# Patient Record
Sex: FEMALE | NOT HISPANIC OR LATINO | Employment: FULL TIME | URBAN - METROPOLITAN AREA
[De-identification: names, ages, dates, MRNs, and addresses within clinical notes are randomized per-mention and may not be internally consistent; named-entity substitution may affect disease eponyms.]

---

## 2017-06-28 ENCOUNTER — PRE VISIT (OUTPATIENT)
Dept: NEUROLOGY | Facility: CLINIC | Age: 57
End: 2017-06-28

## 2017-06-29 NOTE — TELEPHONE ENCOUNTER
Records received from [place]. Notified the film room to pull images.   Included  Office notes: 7/3/14, 7/2/14, 7/1/14   Phone calls/notes: 4/5/17, 7/23/14   Radiology reports: MRI head on 7/2/14   MRI cervical and thoracic on 7/2/14

## 2017-06-30 NOTE — TELEPHONE ENCOUNTER
Records received from [facility].  Included  ED notes: 10/13/13, 10/16/13  Radiology reports: xray left shoulder on 10/13/13

## 2017-07-11 ENCOUNTER — TELEPHONE (OUTPATIENT)
Dept: NEUROLOGY | Facility: CLINIC | Age: 57
End: 2017-07-11

## 2017-07-11 ENCOUNTER — OFFICE VISIT (OUTPATIENT)
Dept: NEUROLOGY | Facility: CLINIC | Age: 57
End: 2017-07-11
Attending: PSYCHIATRY & NEUROLOGY
Payer: COMMERCIAL

## 2017-07-11 ENCOUNTER — MEDICAL CORRESPONDENCE (OUTPATIENT)
Dept: HEALTH INFORMATION MANAGEMENT | Facility: CLINIC | Age: 57
End: 2017-07-11

## 2017-07-11 VITALS
HEIGHT: 65 IN | WEIGHT: 140 LBS | HEART RATE: 74 BPM | BODY MASS INDEX: 23.32 KG/M2 | DIASTOLIC BLOOD PRESSURE: 69 MMHG | SYSTOLIC BLOOD PRESSURE: 102 MMHG

## 2017-07-11 DIAGNOSIS — G35 MULTIPLE SCLEROSIS (H): ICD-10-CM

## 2017-07-11 DIAGNOSIS — G35 MULTIPLE SCLEROSIS (H): Primary | ICD-10-CM

## 2017-07-11 LAB
ANION GAP SERPL CALCULATED.3IONS-SCNC: 4 MMOL/L (ref 3–14)
BASOPHILS # BLD AUTO: 0 10E9/L (ref 0–0.2)
BASOPHILS NFR BLD AUTO: 0.5 %
BUN SERPL-MCNC: 11 MG/DL (ref 7–30)
CALCIUM SERPL-MCNC: 9.3 MG/DL (ref 8.5–10.1)
CHLORIDE SERPL-SCNC: 104 MMOL/L (ref 94–109)
CO2 SERPL-SCNC: 30 MMOL/L (ref 20–32)
CREAT SERPL-MCNC: 0.62 MG/DL (ref 0.52–1.04)
DIFFERENTIAL METHOD BLD: NORMAL
EOSINOPHIL # BLD AUTO: 0.1 10E9/L (ref 0–0.7)
EOSINOPHIL NFR BLD AUTO: 3.3 %
ERYTHROCYTE [DISTWIDTH] IN BLOOD BY AUTOMATED COUNT: 13.9 % (ref 10–15)
GFR SERPL CREATININE-BSD FRML MDRD: NORMAL ML/MIN/1.7M2
GLUCOSE SERPL-MCNC: 81 MG/DL (ref 70–99)
HBV CORE AB SERPL QL IA: NONREACTIVE
HBV SURFACE AB SERPL IA-ACNC: 0.05 M[IU]/ML
HBV SURFACE AG SERPL QL IA: NONREACTIVE
HCT VFR BLD AUTO: 42.1 % (ref 35–47)
HGB BLD-MCNC: 13.6 G/DL (ref 11.7–15.7)
IMM GRANULOCYTES # BLD: 0 10E9/L (ref 0–0.4)
IMM GRANULOCYTES NFR BLD: 0.2 %
LYMPHOCYTES # BLD AUTO: 1.9 10E9/L (ref 0.8–5.3)
LYMPHOCYTES NFR BLD AUTO: 43.6 %
MCH RBC QN AUTO: 28.2 PG (ref 26.5–33)
MCHC RBC AUTO-ENTMCNC: 32.3 G/DL (ref 31.5–36.5)
MCV RBC AUTO: 87 FL (ref 78–100)
MONOCYTES # BLD AUTO: 0.4 10E9/L (ref 0–1.3)
MONOCYTES NFR BLD AUTO: 9 %
NEUTROPHILS # BLD AUTO: 1.8 10E9/L (ref 1.6–8.3)
NEUTROPHILS NFR BLD AUTO: 43.4 %
NRBC # BLD AUTO: 0 10*3/UL
NRBC BLD AUTO-RTO: 0 /100
PLATELET # BLD AUTO: 242 10E9/L (ref 150–450)
POTASSIUM SERPL-SCNC: 4 MMOL/L (ref 3.4–5.3)
RBC # BLD AUTO: 4.83 10E12/L (ref 3.8–5.2)
SODIUM SERPL-SCNC: 138 MMOL/L (ref 133–144)
WBC # BLD AUTO: 4.2 10E9/L (ref 4–11)

## 2017-07-11 PROCEDURE — 99212 OFFICE O/P EST SF 10 MIN: CPT | Mod: ZF

## 2017-07-11 PROCEDURE — 86704 HEP B CORE ANTIBODY TOTAL: CPT | Performed by: PSYCHIATRY & NEUROLOGY

## 2017-07-11 PROCEDURE — 86706 HEP B SURFACE ANTIBODY: CPT | Performed by: PSYCHIATRY & NEUROLOGY

## 2017-07-11 PROCEDURE — 36415 COLL VENOUS BLD VENIPUNCTURE: CPT | Performed by: PSYCHIATRY & NEUROLOGY

## 2017-07-11 PROCEDURE — 80048 BASIC METABOLIC PNL TOTAL CA: CPT | Performed by: PSYCHIATRY & NEUROLOGY

## 2017-07-11 PROCEDURE — 87340 HEPATITIS B SURFACE AG IA: CPT | Performed by: PSYCHIATRY & NEUROLOGY

## 2017-07-11 PROCEDURE — 85025 COMPLETE CBC W/AUTO DIFF WBC: CPT | Performed by: PSYCHIATRY & NEUROLOGY

## 2017-07-11 RX ORDER — LANOLIN ALCOHOL/MO/W.PET/CERES
3 CREAM (GRAM) TOPICAL
COMMUNITY
Start: 2014-07-29

## 2017-07-11 RX ORDER — DIPHENHYDRAMINE HCL 25 MG
50 CAPSULE ORAL ONCE
Status: CANCELLED | OUTPATIENT
Start: 2017-07-11 | End: 2017-07-11

## 2017-07-11 RX ORDER — GLATIRAMER 40 MG/ML
40 INJECTION, SOLUTION SUBCUTANEOUS
COMMUNITY
Start: 2017-06-19 | End: 2019-04-16

## 2017-07-11 RX ORDER — METHYLPREDNISOLONE SODIUM SUCCINATE 125 MG/2ML
125 INJECTION, POWDER, LYOPHILIZED, FOR SOLUTION INTRAMUSCULAR; INTRAVENOUS ONCE
Status: CANCELLED | OUTPATIENT
Start: 2017-07-11

## 2017-07-11 RX ORDER — ACETAMINOPHEN 325 MG/1
650 TABLET ORAL ONCE
Status: CANCELLED | OUTPATIENT
Start: 2017-07-11

## 2017-07-11 ASSESSMENT — PAIN SCALES - GENERAL: PAINLEVEL: NO PAIN (0)

## 2017-07-11 NOTE — TELEPHONE ENCOUNTER
Orders signed by Dr Shannon; I have alerted our PA team of this referral to [facility].    Tata Roberts, MS RN Care Coordinator

## 2017-07-11 NOTE — TELEPHONE ENCOUNTER
PA Initiation    Medication: Ampyra 10mg  Insurance Company: [employer] - Phone [phone] Fax [phone]  [facility] [place] [place] [facility] [address] [place] [address]  Filling Pharmacy Phone:    Filling Pharmacy Fax:    Start Date: 7/11/2017

## 2017-07-11 NOTE — TELEPHONE ENCOUNTER
Prior Authorization Specialty Medication Request    Medication/Dose: Ampyra 10mg  Diagnosis and ICD: Multiple Sclerosis and Neurologic Gait Dysfunction, G35  New/Renewal/Insurance Change PA: New    Important Lab Values: n/a    Previously Tried and Failed Therapies: n/a    Rationale: There is no other FDA approved medication to help increase walking speed and gait safety.    Would you like to include any research articles? n/a   If yes please include the hyperlink(s) below or fax @ [phone].    (Include Name and MRN)    If you received a fax notification from an outside Pharmacy;  Pharmacy Name: n/a  Pharmacy #: n/a  Pharmacy Fax: n/a

## 2017-07-11 NOTE — TELEPHONE ENCOUNTER
[employer] information:    Case ID [id number]  Patient ID [id number]    Tata Roberts MS RN Care Coordinator

## 2017-07-11 NOTE — MR AVS SNAPSHOT
After Visit Summary   7/11/2017    Colletta Dwen Sorrell    MRN: [id number]           Patient Information     Date Of Birth          1960        Visit Information        Provider Department      7/11/2017 9:00 AM Barrera Shannon MD [facility] Multiple Sclerosis        Today's Diagnoses     Multiple sclerosis (H)    -  1       Follow-ups after your visit        Your next 10 appointments already scheduled     Jul 11, 2017 10:30 AM CDT   Lab with  LAB   [facility] ([facility])    [address] [place] [address]  1st [facility] [id number] [phone]            Jan 09, 2018 10:00 AM CST   (Arrive by 9:45 AM)   Return Multiple Sclerosis with Barrera Shannon MD   [facility] Multiple Sclerosis ([facility])    [address] [place] [id number] [phone]              Future tests that were ordered for you today     Open Future Orders        Priority Expected Expires Ordered    Hepatitis B surface antigen Routine  7/11/2018 7/11/2017    Basic metabolic panel Routine  7/11/2018 7/11/2017    CBC with platelets differential Routine  7/11/2018 7/11/2017            Who to contact     If you have questions or need follow up information about today's clinic visit or your schedule please contact [facility] MULTIPLE SCLEROSIS directly at [phone].  Normal or non-critical lab and imaging results will be communicated to you by MyChart, letter or phone within 4 business days after the clinic has received the results. If you do not hear from us within 7 days, please contact the clinic through MyChart or phone. If you have a critical or abnormal lab result, we will notify you by phone as soon as possible.  Submit refill requests through [employer] or call your pharmacy and they will forward the refill request to us. Please allow 3 business days for your refill to be completed.          Additional Information About Your  "Visit        [employer] Information     [employer] lets you send messages to your doctor, view your test results, renew your prescriptions, schedule appointments and more. To sign up, go to [email or web address] . Click on \"Log in\" on the left side of the screen, which will take you to the Welcome page. Then click on \"Sign up Now\" on the right side of the page.     You will be asked to enter the access code listed below, as well as some personal information. Please follow the directions to create your username and password.     Your access code is: [email or web address]  Expires: 2017  6:30 AM     Your access code will  in 90 days. If you need help or a new code, please call your [place] clinic or [phone].        Care EveryWhere ID     This is your Care EveryWhere ID. This could be used by other organizations to access your [place] medical records  [id number]        Your Vitals Were     Pulse Height BMI (Body Mass Index)             74 1.651 m (5' 5\") 23.3 kg/m2          Blood Pressure from Last 3 Encounters:   17 102/69    Weight from Last 3 Encounters:   17 63.5 kg (140 lb)              We Performed the Following     Hepatitis B core antibody     Hepatitis B Surface Antibody        Primary Care Provider Office Phone # Fax #    Amrita H O Alexy [phone]       [facility] [address] [place] [phone]        Equal Access to Services     [facility]: Hadii aad ku hadasho Soomaali, waaxda luqadaha, qaybta kaalmada adeegyada, waxay idiin hayjhonatann nir elamaraazul bangura . So [facility] [phone].    ATENCIÓN: Si habla ivan, tiene a vazquez disposición servicios gratuitos de asistencia lingüística. Llame al [phone].    We comply with applicable federal civil rights laws and [place] laws. We do not discriminate on the basis of race, color, national origin, age, disability sex, sexual orientation or gender identity.            Thank you!     Thank you for choosing " [facility] MULTIPLE SCLEROSIS  for your care. Our goal is always to provide you with excellent care. Hearing back from our patients is one way we can continue to improve our services. Please take a few minutes to complete the written survey that you may receive in the mail after your visit with us. Thank you!             Your Updated Medication List - Protect others around you: Learn how to safely use, store and throw away your medicines at www.disposemymeds.org.          This list is accurate as of: 7/11/17 10:27 AM.  Always use your most recent med list.                   Brand Name Dispense Instructions for use Diagnosis    cholecalciferol 1000 UNIT tablet    vitamin D     Take 1 tablet by mouth daily.    Multiple sclerosis (H)       Glatiramer Acetate 40 MG/ML Sosy      Inject 40 mg Subcutaneous    Multiple sclerosis (H)       MAGNESIUM PO       Multiple sclerosis (H)       melatonin 3 MG tablet      Take 3 mg by mouth    Multiple sclerosis (H)       MULTIVITAMIN ADULT PO       Multiple sclerosis (H)       VITAMIN B COMPLEX PO       Multiple sclerosis (H)

## 2017-07-11 NOTE — NURSING NOTE
"Chief Complaint   Patient presents with     Consult     New MS       Initial /69  Pulse 74  Ht 1.651 m (5' 5\")  Wt 63.5 kg (140 lb)  BMI 23.3 kg/m2 Estimated body mass index is 23.3 kg/(m^2) as calculated from the following:    Height as of this encounter: 1.651 m (5' 5\").    Weight as of this encounter: 63.5 kg (140 lb).  Medication Reconciliation: complete   Maye MATOS    "

## 2017-07-11 NOTE — TELEPHONE ENCOUNTER
Patient was in for an office visit today with Dr Shannon, and the decision has been made to start Ocrevus; Ocrevus start form faxed to [employer]; Labs are pending; Ocrevus therapy plan placed and routed to Dr Shannon for signature; Will alert our PA team once signed.    Tata Roberts MS RN Care Coordinator

## 2017-07-11 NOTE — TELEPHONE ENCOUNTER
Start form faxed to [facility] at tel [phone] and sent to scanning.

## 2017-07-11 NOTE — LETTER
"7/11/2017       RE: Colletta Dwen Sorrell  [address] [id number]     Dear Colleague,    Thank you for referring your patient, Colletta Dwen Sorrell, to the [facility] MULTIPLE SCLEROSIS at [facility]. Please see a copy of my visit note below.      Reason for Visit:  Colletta Sorrell is a 57-year-old woman referred by Dr. Tonny Acosta for neurologic consultation regarding multiple sclerosis.    History of Present Illness:  Colletta tells me that her neurologic symptoms began about 14 years ago when she would have a \"limp\" that would come and go.  This gradually increased in frequency and severity.  Initially this affected only the left leg.  After a few years, she pursued workup at [facility] but tells me she had no diagnosis established.  She saw Dr. Wagner at [facility] in 2013 and had workup including MRI scans showing lesions typical for multiple sclerosis as well as spinal fluid analysis showing intrathecal IgG synthesis.  She was diagnosed with multiple sclerosis and started on Copaxone, which she has continued ever since.  I am unable to elicit any history of \"attacks\" of neurologic symptoms reminiscent of MS relapses.  Instead, her course has been one of insidiously progressive spastic triparesis, affecting the left leg, the right leg and the left arm.  Her walking has gradually worsened over the years.  She is able to walk with a one point cane but is quite slow and unsteady.  The left leg tends to drag.  The left arm has become more subtly weak and she has occasional tremor in the left arm.  She gets clonus and extensor spasms in both legs, worse on the left.  She has fatigue, urgency of bladder, and short term memory difficulty and word-finding difficulty.  No vision loss, color desaturation, eye pain, diplopia, vertigo, dysphagia or dysarthria.  She has no notable numbness or tingling.      She had a second opinion at " [facility] in 2014.  They felt she had the primary progressive form of the disorder and questioned whether Copaxone was helping her.  Given her history and exam findings, I think this is correct.     Past Medical History:    1.  Multiple sclerosis  2.  Status post right breast lumpectomy which was benign at age 20    Allergies:  No known drug allergies    Medications:    1.  Copaxone 40 mg 3 times a week  2.  Vitamin D  3.  Multivitamin  4.  Melatonin    Family History:  Her father had MS and is no longer living.    Social History:  She is single and lives with her adult daughter in [place].  She is a former smoker.  She does not abuse alcohol.    Review of Systems:  Mood, appetite and sleep are normal.  No dry eyes or dry mouth.  She has had some nonspecific nonexertional chest pain.  No dyspnea, cough, abdominal pain, nausea, vomiting, rashes, edema, arthralgias or myalgias.    Physical Exam:  Vital Signs:  Blood pressure 102/69, pulse 74, weight 140.  General:  She is a well-developed, age appropriate -American woman in no apparent distress.  HEENT:  Sclerae are anicteric.  The oropharynx is clear.   Neck:  Supple without adenopathy or thyromegaly or carotid bruits.  Chest:  Clear to auscultation.  Cardiac:  Rate and rhythm are normal without murmurs or rubs.  Neurologic:  She is alert and oriented.  Affect is bright and language functions are normal.  Pupils are equal and normally reactive.  There is no afferent pupillary defect.  Eye movements are full without diplopia or nystagmus.  Facial strength and sensation are normal.  Muscle bulk and tone are normal.  Strength in the right arm is normal but there is weakness of elbow extension, wrist extension and finger abduction in the left arm, all graded 4/5.  Hip flexion is weak bilaterally, worse on the left.  Knee flexion is normal on the right but weak at the left at 4/5.  Ankle dorsiflexion is 5/5 on the right and 0/5 on the left.   Finger-nose-finger is normal but finger tapping is mildly slowed on the left.  Toe tapping is slow on the right and impossible on the left.  Light touch in the arms and legs is intact as is pinprick sensation.  Deep tendon reflexes are pathologically brisk but symmetric in the arms.  They are asymmetric in the knees, brisker on the left.  She has bilateral Babinski and bilateral Ornelas reflexes.  She has a spastic, ataxic, left hemiparetic gait.  Tandem gait is impossible.  Romberg sign was absent.  A 25-foot timed walk was done in 25 seconds using a one-point cane.      Discussion:  I reviewed medical records including office notes from [facility], and MRIs of the brain and spinal cord from 07/2014 which I personally reviewed with the patient.  The office notes recapitulate the history as described above.  They make reference to CSF showing 6 oligoclonal bands.  MRI of the brain from 07/2014 shows a moderate burden of T2 hyperintense lesions in the cerebral white matter with size, shape and distribution typical for multiple sclerosis.  These are predominantly radially-oriented periventricular lesions.  There were no enhancing lesions.  Cervical spine MRI from the same date shows multiple T2 hyperintense lesions in the cervical spinal cord and thoracic spine MRI shows several likely demyelinating lesions as well.  Again there were no active enhancing lesions.     Impression:  Primary progress multiple sclerosis.  Ms. Hopper has a typical clinical course, typical exam findings, and typical findings on CSF and MRI.  She definitely has multiple sclerosis and it fits perfectly with the primary progressive phenotype.  I spent 65 minutes with her today, greater than 50% of which was spent in counseling and coordination of care.  I described primary progressive MS and contrasted that to the more common relapseing-remitting form of the disorder.  I discussed potential treatment options  in detail.  Recently Ocreliuzmab has been FDA approved as the first medication to show a proven, if modest, benefit in slowing disease progression in primary progressive MS. None of the other immunotherapies including Copaxone have shown a benefit.  We discussed other options including unproven but possibly effective treatments such as pulsed corticosteroids, and clinical trials with experimental agents.  We also discussed symptomatic treatment, focusing on Dalfampridine.  I do think she would be a reasonable candidate for that and she would like to proceed both with a trial of Dalfampridine and with Ocrelizumab therapy.  Regarding the latter, I think that is appropriate as she is still ambulatory and obviously continuing to progress.  She may be a candidate for the ACTH trial as well, once she is stable on (or off) the dalfampridine and ocrelizumab.    Plan:    1.  Hepatitis B screening  2.  Basic metabolic panel to evaluate renal function for Dalfampridine.  3.  Assuming the labs show no contraindications, a trial of Dalfampridine as well as starting Ocrelizumab therapy  4.  Return to clinic in 6 months    Thank you for allowing me to participate in her care.              Barrera Shannon MD    D:  07/13/2017 15:09 T:  07/19/2017 09:29  Document:  [id number] AC\KH

## 2017-07-14 NOTE — TELEPHONE ENCOUNTER
Phoned [facility] at tel [phone] to check status of Ampyra PA.     Spoke with Amy Smith at tel [phone] and [facility] is unable to approved Ampyra because Dr Shannon is an out of network provider (for that matter, the Ocrevis will also be denied). Patient would have to have [facility] prescribe the Ampyra and order the Ocrevis or patient will have to change plans in order to keep being seen by Dr Shannon.    Left message for Colletta to discuss.

## 2017-07-18 NOTE — TELEPHONE ENCOUNTER
Patient's insurance, [employer], will not approve the Ocrevus because the prescribing physician is out of network; Patient is going to be calling to change her health plan.    Tata Roberts, MS RN Care Coordinator

## 2017-07-19 NOTE — PROGRESS NOTES
"  Reason for Visit:  Colletta Sorrell is a 57-year-old woman referred by Dr. Tonny Acosta for neurologic consultation regarding multiple sclerosis.    History of Present Illness:  Colletta tells me that her neurologic symptoms began about 14 years ago when she would have a \"limp\" that would come and go.  This gradually increased in frequency and severity.  Initially this affected only the left leg.  After a few years, she pursued workup at [facility] but tells me she had no diagnosis established.  She saw Dr. Wagner at [facility] in 2013 and had workup including MRI scans showing lesions typical for multiple sclerosis as well as spinal fluid analysis showing intrathecal IgG synthesis.  She was diagnosed with multiple sclerosis and started on Copaxone, which she has continued ever since.  I am unable to elicit any history of \"attacks\" of neurologic symptoms reminiscent of MS relapses.  Instead, her course has been one of insidiously progressive spastic triparesis, affecting the left leg, the right leg and the left arm.  Her walking has gradually worsened over the years.  She is able to walk with a one point cane but is quite slow and unsteady.  The left leg tends to drag.  The left arm has become more subtly weak and she has occasional tremor in the left arm.  She gets clonus and extensor spasms in both legs, worse on the left.  She has fatigue, urgency of bladder, and short term memory difficulty and word-finding difficulty.  No vision loss, color desaturation, eye pain, diplopia, vertigo, dysphagia or dysarthria.  She has no notable numbness or tingling.      She had a second opinion at [facility] in 2014.  They felt she had the primary progressive form of the disorder and questioned whether Copaxone was helping her.  Given her history and exam findings, I think this is correct.     Past Medical History:    1.  Multiple sclerosis  2.  Status post right breast lumpectomy which was benign at " age 20    Allergies:  No known drug allergies    Medications:    1.  Copaxone 40 mg 3 times a week  2.  Vitamin D  3.  Multivitamin  4.  Melatonin    Family History:  Her father had MS and is no longer living.    Social History:  She is single and lives with her adult daughter in [place].  She is a former smoker.  She does not abuse alcohol.    Review of Systems:  Mood, appetite and sleep are normal.  No dry eyes or dry mouth.  She has had some nonspecific nonexertional chest pain.  No dyspnea, cough, abdominal pain, nausea, vomiting, rashes, edema, arthralgias or myalgias.    Physical Exam:  Vital Signs:  Blood pressure 102/69, pulse 74, weight 140.  General:  She is a well-developed, age appropriate -American woman in no apparent distress.  HEENT:  Sclerae are anicteric.  The oropharynx is clear.   Neck:  Supple without adenopathy or thyromegaly or carotid bruits.  Chest:  Clear to auscultation.  Cardiac:  Rate and rhythm are normal without murmurs or rubs.  Neurologic:  She is alert and oriented.  Affect is bright and language functions are normal.  Pupils are equal and normally reactive.  There is no afferent pupillary defect.  Eye movements are full without diplopia or nystagmus.  Facial strength and sensation are normal.  Muscle bulk and tone are normal.  Strength in the right arm is normal but there is weakness of elbow extension, wrist extension and finger abduction in the left arm, all graded 4/5.  Hip flexion is weak bilaterally, worse on the left.  Knee flexion is normal on the right but weak at the left at 4/5.  Ankle dorsiflexion is 5/5 on the right and 0/5 on the left.  Finger-nose-finger is normal but finger tapping is mildly slowed on the left.  Toe tapping is slow on the right and impossible on the left.  Light touch in the arms and legs is intact as is pinprick sensation.  Deep tendon reflexes are pathologically brisk but symmetric in the arms.  They are asymmetric in the knees, brisker  on the left.  She has bilateral Babinski and bilateral Ornelas reflexes.  She has a spastic, ataxic, left hemiparetic gait.  Tandem gait is impossible.  Romberg sign was absent.  A 25-foot timed walk was done in 25 seconds using a one-point cane.      Discussion:  I reviewed medical records including office notes from [facility] and [facility], and MRIs of the brain and spinal cord from 07/2014 which I personally reviewed with the patient.  The office notes recapitulate the history as described above.  They make reference to CSF showing 6 oligoclonal bands.  MRI of the brain from 07/2014 shows a moderate burden of T2 hyperintense lesions in the cerebral white matter with size, shape and distribution typical for multiple sclerosis.  These are predominantly radially-oriented periventricular lesions.  There were no enhancing lesions.  Cervical spine MRI from the same date shows multiple T2 hyperintense lesions in the cervical spinal cord and thoracic spine MRI shows several likely demyelinating lesions as well.  Again there were no active enhancing lesions.     Impression:  Primary progress multiple sclerosis.  Ms. Hopper has a typical clinical course, typical exam findings, and typical findings on CSF and MRI.  She definitely has multiple sclerosis and it fits perfectly with the primary progressive phenotype.  I spent 65 minutes with her today, greater than 50% of which was spent in counseling and coordination of care.  I described primary progressive MS and contrasted that to the more common relapseing-remitting form of the disorder.  I discussed potential treatment options in detail.  Recently Ocreliuzmab has been FDA approved as the first medication to show a proven, if modest, benefit in slowing disease progression in primary progressive MS. None of the other immunotherapies including Copaxone have shown a benefit.  We discussed other options including unproven but possibly effective  treatments such as pulsed corticosteroids, and clinical trials with experimental agents.  We also discussed symptomatic treatment, focusing on Dalfampridine.  I do think she would be a reasonable candidate for that and she would like to proceed both with a trial of Dalfampridine and with Ocrelizumab therapy.  Regarding the latter, I think that is appropriate as she is still ambulatory and obviously continuing to progress.  She may be a candidate for the ACTH trial as well, once she is stable on (or off) the dalfampridine and ocrelizumab.    Plan:    1.  Hepatitis B screening  2.  Basic metabolic panel to evaluate renal function for Dalfampridine.  3.  Assuming the labs show no contraindications, a trial of Dalfampridine as well as starting Ocrelizumab therapy  4.  Return to clinic in 6 months    Thank you for allowing me to participate in her care.              Barrera Shannon MD    D:  07/13/2017 15:09 T:  07/19/2017 09:29  Document:  [id number] AC\KH

## 2017-07-27 NOTE — TELEPHONE ENCOUNTER
Patient notified clinic that she will be enrolling in an alternative insurance that we accept here at the  and it will be active 8-1-17.

## 2017-07-28 ENCOUNTER — TELEPHONE (OUTPATIENT)
Dept: NEUROLOGY | Facility: CLINIC | Age: 57
End: 2017-07-28

## 2017-07-28 DIAGNOSIS — G35 MULTIPLE SCLEROSIS (H): Primary | ICD-10-CM

## 2017-07-28 NOTE — TELEPHONE ENCOUNTER
I received a message from the triage nurse stating that patient had a question about PT; I called her back, and she simply was hoping to have PT ordered for her; Dr Shannon, I would imagine you are okay with this request, correct? Thank you.    Tata Roberts, MS RN Care Coordinator

## 2017-08-01 NOTE — TELEPHONE ENCOUNTER
Dr Shannon fine with PT; This order has been placed per Dr Shannon.    Tata Roberts, MS RN Care Coordinator

## 2017-08-01 NOTE — TELEPHONE ENCOUNTER
PA Initiation    Medication: Ampyra 10mg  Insurance Company: [employer] - Phone [phone] Fax [phone]  Pharmacy Filling the Rx: [facility]  Filling Pharmacy Phone:    Filling Pharmacy Fax:    Start Date: 8/1/2017

## 2017-08-02 ENCOUNTER — TELEPHONE (OUTPATIENT)
Dept: NEUROLOGY | Facility: CLINIC | Age: 57
End: 2017-08-02

## 2017-08-02 DIAGNOSIS — G35 MULTIPLE SCLEROSIS (H): Primary | ICD-10-CM

## 2017-08-02 NOTE — TELEPHONE ENCOUNTER
I received a message from the triage nurse stating that Colletta is calling asking for a letter stating she is okay to drive; Dr Shannon, based on your visit with her, are you okay with supplying this letter? And if so, any particular wording? Thank you.    Tata Roberts, MS RN Care Coordinator

## 2017-08-02 NOTE — LETTER
2017    Colletta Dwen Sorrell   1960  DS Code:     To Whom It May Concern:    Colletta Sorrell is a patient of mine who I see for multiple sclerosis at the [facility]. There are no restrictions related to Ms. Hopper's multiple sclerosis to safely and independently operate a motorized vehicle.     Please contact our office with questions.    Sincerely,    MD Tata Selby, MS RN Care Coordinator  [facility] Sclerosis Center  [facility]  [address] [place] [id number]  Phone [phone]  Fax [phone]

## 2017-08-02 NOTE — TELEPHONE ENCOUNTER
I received a message stating that Chelirissa had the following questions:    1) What are the after effects of the Ocrevus infusion?     2) How can I (the patient) prepare my body for the infusion, I am currently buildling up my immune system by taking; multivitamins, Vit B complex, Yeast/fungal detox, omega 3 EPA, Calcium 600 with Vit D, Super Magnesium Complex, Weekly vitamin D 10,000 IU and flax seed oil     3) How exactly does Ampyra work to effect the body to increase walking speed?     4) [facility] had ordered a 4 prong cane but patient remembers getting a call from somewhere stating that it was okay to mail but never received. Please place a new order for a 4 prong cane if possible.     Dr Shannon, could you please assist in answering her questions and request? Thank you.    Tata Roberts, MS RN Care Coordinator

## 2017-08-04 NOTE — TELEPHONE ENCOUNTER
Yes, from my meeting and examination of her, I saw nothing that would suggest she cannot safely operate a motor vehicle.  Fine for the letter.

## 2017-08-04 NOTE — TELEPHONE ENCOUNTER
"1.  Basically none, although as we discussed at our visit, there is a small risk of infusion reactions (itching/hives, fever/chills, etc) at time of infusion. Rarely, people will have some temporary side effects from the pre-medications (feeling wired from the small dose of steroids, or sedated from the small dose of benadryl).  Don't expect any side effects after a day or so after each infusion (and most likely, none at all).    2.  No \"preparation of the body\" is necessary (and I have no such recommendations)    3.  We went over that together, and it's too complicated to discuss again in detail in this format.  It is a potassium channel blocker.  That helps nerve cells transmit messages a bit easier.  If she would like further explanation, I can do that next time I see her, or maybe she can see Harmony sooner.    4.  I believe we have her set up for PT and OT - let's see their recommendations on adaptive equipment, and go from there.    "

## 2017-08-07 NOTE — TELEPHONE ENCOUNTER
Additional Information request received from [employer]:    1) Is the patient currently able to walk 25 feet? Yes  2) Please provide physician attestation that the patient has difficulty walking? Patient completed the 25 foot walk in 22 seconds, which demonstrates difficulty walking.  3) An initial 6 month approval would be sufficient? Yes    Answered questions and faxed information to [employer].

## 2017-08-07 NOTE — TELEPHONE ENCOUNTER
I called Colletta and went through the below information with her; She said that for the cane, she believes someone at [facility] already had ordered that for her, but with her insurance change, it never came; Dr Carpenter, Colletta is somewhat adamant that she needs this now, as the walker she has is too cumbersome for her to use; Are you okay with providing such an order now, as opposed to waiting until her PT appointment (which isn't until late August)? Thank you.    Tata Roberts, MS RN Care Coordinator

## 2017-08-07 NOTE — TELEPHONE ENCOUNTER
Dr Shannon fine with cane order; This has been placed and will be faxed to [facility] (phone [phone] fax [phone]).    Tata Roberts MS RN Care Coordinator

## 2017-08-08 NOTE — TELEPHONE ENCOUNTER
Driving letter written and placed in the mail to her home, as well as faxed to the [employer].    Tata Roberts MS RN Care Coordinator

## 2017-08-09 NOTE — TELEPHONE ENCOUNTER
Prior Authorization Approval    Authorization Effective Date: 8/1/2017  Authorization Expiration Date: 2/1/2018  Medication: Ampyra 10mg APPROVED  Approved Dose/Quantity: 60 per 30 days  Reference #:     Insurance Company: [employer] - Phone [phone] Fax [phone]  Expected CoPay: n/a     CoPay Card Available:      Foundation Assistance Needed:    Which Pharmacy is filling the prescription (Not needed for infusion/clinic administered): [facility]  Pharmacy Notified: No  Patient Notified: Yes

## 2017-08-11 ENCOUNTER — MEDICAL CORRESPONDENCE (OUTPATIENT)
Dept: HEALTH INFORMATION MANAGEMENT | Facility: CLINIC | Age: 57
End: 2017-08-11

## 2017-08-15 ENCOUNTER — INFUSION THERAPY VISIT (OUTPATIENT)
Dept: INFUSION THERAPY | Facility: CLINIC | Age: 57
End: 2017-08-15
Payer: COMMERCIAL

## 2017-08-15 ENCOUNTER — DOCUMENTATION ONLY (OUTPATIENT)
Dept: SPIRITUAL SERVICES | Facility: CLINIC | Age: 57
End: 2017-08-15

## 2017-08-15 VITALS
BODY MASS INDEX: 27.32 KG/M2 | SYSTOLIC BLOOD PRESSURE: 113 MMHG | DIASTOLIC BLOOD PRESSURE: 72 MMHG | WEIGHT: 164.2 LBS | TEMPERATURE: 97.1 F | RESPIRATION RATE: 16 BRPM | HEART RATE: 65 BPM | OXYGEN SATURATION: 98 %

## 2017-08-15 DIAGNOSIS — G35 MULTIPLE SCLEROSIS (H): Primary | ICD-10-CM

## 2017-08-15 DIAGNOSIS — Z71.81 SPIRITUAL OR RELIGIOUS COUNSELING: Primary | ICD-10-CM

## 2017-08-15 PROCEDURE — 99207 ZZC NO CHARGE NURSE ONLY: CPT

## 2017-08-15 PROCEDURE — 96413 CHEMO IV INFUSION 1 HR: CPT | Performed by: INTERNAL MEDICINE

## 2017-08-15 PROCEDURE — 96415 CHEMO IV INFUSION ADDL HR: CPT | Performed by: INTERNAL MEDICINE

## 2017-08-15 PROCEDURE — 96375 TX/PRO/DX INJ NEW DRUG ADDON: CPT | Performed by: INTERNAL MEDICINE

## 2017-08-15 RX ORDER — ACETAMINOPHEN 325 MG/1
650 TABLET ORAL ONCE
Status: CANCELLED | OUTPATIENT
Start: 2017-08-15

## 2017-08-15 RX ORDER — ACETAMINOPHEN 325 MG/1
650 TABLET ORAL ONCE
Status: COMPLETED | OUTPATIENT
Start: 2017-08-15 | End: 2017-08-15

## 2017-08-15 RX ORDER — DIPHENHYDRAMINE HCL 25 MG
50 CAPSULE ORAL ONCE
Status: CANCELLED | OUTPATIENT
Start: 2017-08-15 | End: 2017-08-15

## 2017-08-15 RX ORDER — DIPHENHYDRAMINE HYDROCHLORIDE 50 MG/ML
50 INJECTION INTRAMUSCULAR; INTRAVENOUS
Status: COMPLETED | OUTPATIENT
Start: 2017-08-15 | End: 2017-08-15

## 2017-08-15 RX ORDER — METHYLPREDNISOLONE SODIUM SUCCINATE 125 MG/2ML
125 INJECTION, POWDER, LYOPHILIZED, FOR SOLUTION INTRAMUSCULAR; INTRAVENOUS
Status: COMPLETED | OUTPATIENT
Start: 2017-08-15 | End: 2017-08-15

## 2017-08-15 RX ORDER — METHYLPREDNISOLONE SODIUM SUCCINATE 125 MG/2ML
125 INJECTION, POWDER, LYOPHILIZED, FOR SOLUTION INTRAMUSCULAR; INTRAVENOUS ONCE
Status: CANCELLED | OUTPATIENT
Start: 2017-08-15

## 2017-08-15 RX ORDER — DIPHENHYDRAMINE HCL 25 MG
50 CAPSULE ORAL ONCE
Status: COMPLETED | OUTPATIENT
Start: 2017-08-15 | End: 2017-08-15

## 2017-08-15 RX ORDER — METHYLPREDNISOLONE SODIUM SUCCINATE 125 MG/2ML
125 INJECTION, POWDER, LYOPHILIZED, FOR SOLUTION INTRAMUSCULAR; INTRAVENOUS ONCE
Status: COMPLETED | OUTPATIENT
Start: 2017-08-15 | End: 2017-08-15

## 2017-08-15 RX ADMIN — Medication 50 MG: at 10:11

## 2017-08-15 RX ADMIN — ACETAMINOPHEN 650 MG: 325 TABLET ORAL at 10:11

## 2017-08-15 RX ADMIN — METHYLPREDNISOLONE SODIUM SUCCINATE 125 MG: 125 INJECTION INTRAMUSCULAR; INTRAVENOUS at 12:30

## 2017-08-15 RX ADMIN — DIPHENHYDRAMINE HYDROCHLORIDE 50 MG: 50 INJECTION INTRAMUSCULAR; INTRAVENOUS at 12:25

## 2017-08-15 RX ADMIN — METHYLPREDNISOLONE SODIUM SUCCINATE 125 MG: 125 INJECTION INTRAMUSCULAR; INTRAVENOUS at 10:30

## 2017-08-15 RX ADMIN — Medication 250 ML: at 10:30

## 2017-08-15 NOTE — PROGRESS NOTES
SPIRITUAL HEALTH SERVICES  SPIRITUAL ASSESSMENT Progress Note  [facility]     introduced himself to Colletta Sorrell and informed her of his availability.    Jude Wang M.Div., [facility]  Staff   Office tel: [phone]

## 2017-08-15 NOTE — PROGRESS NOTES
Infusion Nursing Note:  Colletta Dwen Sorrell presents today for Ocrevus.    Patient seen by provider today: No   present during visit today: Not Applicable.    Note: Pt called out reporting back, neck, scalp itching, tickle on roof of mouth and dry cough at 1223. 50mg IV benedryl and 125mg solumedrol given. By 1230 pt reported that symptoms have resolved. VSS throughout. Ocrevus was running at 120ml/hr when pt called out.  Ocrevus restarted at 1251 at 60ml/hr. Infusion was titrated up by 30ml/hr Q30 min. She tolerated the remainder of the infusion. When the volume ran out she was running at 150ml/hr.    She was given instructions that if symptoms return when she is at home to take PO benedryl and call Dr. Shannon's office.     Intravenous Access:  Peripheral IV placed.    Treatment Conditions:  Ocrevus Infusion Checklist:    ~~~ NOTE: If the patient answers yes to any of the questions below, hold the infusion and contact ordering provider or on-call provider.    1. Have you recently had an elevated temperature, fever, chills, productive cough, coughing for 3 weeks or longer or hemoptysis,  abnormal vital signs, night sweats,  chest pain or have you noticed a decrease in your appetite, unexplained weight loss or fatigue? No  2. Do you have any open wounds or new incisions? No  3. Do you have any recent or upcoming hospitalizations, surgeries or dental procedures? No  4. Do you currently have or recently have had any signs of illness or infection or are you on any antibiotics? No  5. Have you had any new, sudden or worsening abdominal pain? No  6. Have you or anyone in your household received a live vaccination in the past 4 weeks? Please note:  No live vaccines while on biologic/chemotherapy until 6 months after the last treatment.  Patient can receive the flu vaccine (shot only) and the pneumovax.  It is optimal for the patient to get these vaccines mid cycle, but they can be given at any time as long  as it is not on the day of the infusion. No  7. Have you recently been diagnosed with any new nervous system diseases (ie. Multiple sclerosis, Guillain [place], seizures, neurological changes) or cancer diagnosis? Are you on any form of radiation or chemotherapy? No  8. Are you pregnant or breast feeding or do you have plans of pregnancy in the future? No  9. Have you been having any signs of worsening depression or suicidal ideations?  (benlysta only) No  10. Have there been any other new onset medical symptoms? No  .      Post Infusion Assessment:  Patient tolerated entire infusion, but she required additional medications for rxn.  Pt was observed for 60 min post Ocrevus per protocol.  No evidence of extravasations.  Access discontinued per protocol.  Rheumatology Post Infusion: POST-INFUSION OF BIOLOGICAL MEDICATION:    Reviewed with patient.  Given biologic medication or medication hand-out. Inform patient if any fever, chills or signs of infection, new symptoms, abdominal pain, heart palpitations, shortness of breath, reaction, weakness, neurological changes, seek medical attention immediately and should not receive infusions. No live virus vaccines prior to or during treatment or up to 6 months post infusion. If the patient has an upcoming procedure or surgery, this should be discussed with the rheumatologist and surgeon or provider..      Discharge Plan:   Patient will return 8/29/17 for next appointment.   Patient discharged in stable condition accompanied by: self and .  Departure Mode: Wheelchair.    Marie Chin RN

## 2017-08-15 NOTE — MR AVS SNAPSHOT
After Visit Summary   8/15/2017    Colletta Dwen Sorrell    MRN: [id number]           Patient Information     Date Of Birth          1960        Visit Information        Provider Department      8/15/2017 9:00 AM [address]        Today's Diagnoses     Multiple sclerosis (H)    -  1       Follow-ups after your visit        Your next 10 appointments already scheduled     Aug 22, 2017 10:30 AM CDT   Neuro Eval with Latoya Montesinos, PT   [facility] ([facility])    [address] [facility] [address] [phone]            Aug 29, 2017  8:00 AM CDT   Level 6 with [address] ([facility])    [address] [address] [id number] [phone]            Jan 09, 2018 10:00 AM CST   (Arrive by 9:45 AM)   Return Multiple Sclerosis with Barrera Shannon MD   [facility] ([facility])    [address] [address] [phone]              Future tests that were ordered for you today     Open Standing Orders        Priority Remaining Interval Expires Ordered    Notify Physician Routine [id number] PRN  8/15/2017            Who to contact     If you have questions or need follow up information about today's clinic visit or your schedule please contact [facility] directly at [phone].  Normal or non-critical lab and imaging results will be communicated to you by MyChart, letter or phone within 4 business days after the clinic has received the results. If you do not hear from us within 7 days, please contact the clinic through MyChart or phone. If you have a critical or abnormal lab result, we will notify you by phone as soon as possible.  Submit refill requests through [employer] or call your pharmacy and they will forward the refill request to us. Please allow 3  business days for your refill to be completed.          Additional Information About Your Visit        [employer] Information     [employer] is an electronic gateway that provides easy, online access to your medical records. With [employer], you can request a clinic appointment, read your test results, renew a prescription or communicate with your care team.     To sign up for [employer] visit the website at [email or web address]   You will be asked to enter the access code listed below, as well as some personal information. Please follow the directions to create your username and password.     Your access code is: [email or web address]  Expires: 2017  6:30 AM     Your access code will  in 90 days. If you need help or a new code, please contact your [facility] or call [phone] for assistance.        Care EveryWhere ID     This is your Care EveryWhere ID. This could be used by other organizations to access your [place] medical records  [id number]        Your Vitals Were     Pulse Temperature Respirations Pulse Oximetry BMI (Body Mass Index)       65 97.1  F (36.2  C) (Oral) 16 98% 27.32 kg/m2        Blood Pressure from Last 3 Encounters:   08/15/17 113/72   17 102/69    Weight from Last 3 Encounters:   08/15/17 74.5 kg (164 lb 3.2 oz)   17 63.5 kg (140 lb)              Today, you had the following     No orders found for display       Primary Care Provider Office Phone # Fax #    Amrita H SURENDRA Tracey [phone]       [facility] [address] [facility] [address]  [facility] [address]        Equal Access to Services     ALEKS HSUKLA : Hadii aad ku hadasho Somarizaali, waaxda luqadaha, qaybta kaalmada chaz, eboni ramirez. So [facility] [phone].    ATENCIÓN: Si habla español, tiene a vazquez disposición servicios gratuitos de asistencia lingüística. Matthew al [phone].    We comply with applicable federal civil rights laws and [place]  laws. We do not discriminate on the basis of race, color, national origin, age, disability sex, sexual orientation or gender identity.            Thank you!     Thank you for choosing [facility]  for your care. Our goal is always to provide you with excellent care. Hearing back from our patients is one way we can continue to improve our services. Please take a few minutes to complete the written survey that you may receive in the mail after your visit with us. Thank you!             Your Updated Medication List - Protect others around you: Learn how to safely use, store and throw away your medicines at www.disposemymeds.org.          This list is accurate as of: 8/15/17  4:13 PM.  Always use your most recent med list.                   Brand Name Dispense Instructions for use Diagnosis    cholecalciferol 1000 UNIT tablet    vitamin D     Take 1 tablet by mouth daily.    Multiple sclerosis (H)       Glatiramer Acetate 40 MG/ML Sosy      Inject 40 mg Subcutaneous    Multiple sclerosis (H)       MAGNESIUM PO       Multiple sclerosis (H)       melatonin 3 MG tablet      Take 3 mg by mouth    Multiple sclerosis (H)       MULTIVITAMIN ADULT PO       Multiple sclerosis (H)       VITAMIN B COMPLEX PO       Multiple sclerosis (H)

## 2017-08-17 NOTE — TELEPHONE ENCOUNTER
Ocrevus was approved through patient's insurance on 8/11/17 and she has received her first dose of medication.    Tata Roberts MS RN Care Coordinator

## 2017-08-22 NOTE — TELEPHONE ENCOUNTER
I received a message stating Alicia wasn't sure if the DMV letter was sent; I called her and left VMM advising that this was done back on August 8th, and to let me know if it needs to be refaxed.    Tata Roberts, MS RN Care Coordinator

## 2017-08-25 ENCOUNTER — HOSPITAL ENCOUNTER (OUTPATIENT)
Dept: PHYSICAL THERAPY | Facility: CLINIC | Age: 57
Setting detail: THERAPIES SERIES
End: 2017-08-25
Attending: PSYCHIATRY & NEUROLOGY
Payer: COMMERCIAL

## 2017-08-25 PROCEDURE — 97530 THERAPEUTIC ACTIVITIES: CPT | Mod: GP | Performed by: PHYSICAL THERAPIST

## 2017-08-25 PROCEDURE — 97161 PT EVAL LOW COMPLEX 20 MIN: CPT | Mod: GP | Performed by: PHYSICAL THERAPIST

## 2017-08-25 PROCEDURE — 97110 THERAPEUTIC EXERCISES: CPT | Mod: GP | Performed by: PHYSICAL THERAPIST

## 2017-08-25 PROCEDURE — 40000719 ZZHC STATISTIC PT DEPARTMENT NEURO VISIT: Performed by: PHYSICAL THERAPIST

## 2017-08-26 NOTE — PROGRESS NOTES
08/25/17 1400   Quick Adds   Type of Visit Initial OP PT Evaluation   General Information   Start of Care Date 08/25/17   Referring Physician Barrera Shannon MD   Orders Evaluate and Treat as Indicated   Order Date 08/01/17   Medical Diagnosis Multiple Sclerosis    Onset of illness/injury or Date of Surgery (~4 years ago )   Precautions/Limitations fall precautions   Surgical/Medical history reviewed Yes   Pertinent history of current problem (include personal factors and/or comorbidities that impact the POC) Diagnosed with MS ~4 years ago, primary progressive, but was having syptoms years before that. L side is weaker than R side (R side mostly full strength per patient). Started Ocreus infusion therapy every few weeks. Reports that she is very fatigued all of the time- does not move around much some day d/t this. Uses quad cane in community but does not use cane in the house- she usually just grabs onto furniture. Has done some PT in the past and things were getting stronger, but now she feels she has not been doing much for exercise and so she has not been getting much stronger. She also says she is very stiff on her L side- she sometimes gets some tingling and pain in L arm but it is intermittent. Feels very tight in her shoulders. She avoids walking long distances d/t fatigue. Daughter helps at home with somethings but patient tries to do everything she still can on her own.    Pertinent Visual History  wears reading glasses- no reports of double vision    Prior level of function comment independent with all transfers and gait but needs UE support from cane or wall    Previous/Current Treatment Physical Therapy   Improvement after PT Moderate   Current Community Support Family/friend caregiver   Patient role/Employment history Disabled   Living environment House/[facility]   Home/Community Accessibility Comments 6 stairs at home- rails on 1 side; lives with daughter who is 34 years ago.    Current  Assistive Devices Front Wheeled Walker;Quad Cane   ADL Devices Shower/Tub Grab Bar   Assistive Devices Comments has quad cane- does not use all the time at home    Patient/Family Goals Statement decreased tightness in L side and improve her strength, improve her walking, less tightness in shoulders    Fall Risk Screen   Fall screen completed by PT   Per patient - Fall 2 or more times in past year? Yes   Per patient - Fall with injury in past year? Yes   Timed Up and Go score (seconds) 39.03   (quad cane)   Is patient a fall risk? Yes   Fall screen comments increaesed fall risk d/t difficulty clearing her L foot during gait and balance impairments    Pain   Pain comments sometimes get L arm pain- not happening this session but describes as a sensation that goes down her arm starting from her neck.    Cognitive Status Examination   Orientation orientation to person, place and time   Level of Consciousness alert   Follows Commands and Answers Questions 100% of the time   Personal Safety and Judgment intact   Memory intact   Integumentary   Integumentary Comments mild swelling in LEs- no pitting    Posture   Posture Comments mild foward shoulder posture in sitting; increased muscle tightness and use of upper trap muscles at B shoulders    Range of Motion (ROM)   ROM Comment DF to neutral on LLE- ROM is WFL on LUE and LLE and but has tone/spasticity in LLE that prevents her from achieving full active ROM but with assist from PT with passive ROM is able to achieve.    MMT: Shoulder, Rehab Eval   Shoulder Flexion - Left Side (5/5) normal,left   Shoulder ABduction - Left Side (5/5) normal,left   Shoulder Internal Rotation - Left Side (4/5) good, left   Shoulder External Rotation - Left Side (4/5) good, left   Shoulder Flexion - Right Side (5/5) normal,right   Shoulder ABduction - Right Side (5/5) normal,right   Shoulder Internal Rotation - Right Side (5/5) normal,right   Shoulder External Rotation - Right Side (5/5)  normal,right   MMT: Elbow/Forearm, Rehab Eval   Elbow Flexion - Left Side (5/5) normal,left   Elbow Extension - Left Side (5/5) normal,left   Elbow Flexion - Right Side (5/5) normal,right   Elbow Extension - Right Side (5/5) normal,right   Hand Strength   Hand Muscle Testing Results did not formally-- reduced  strength on L vs R    MMT: Trunk, Rehab Eval   Trunk Muscle Testing Results decreaed core strength    MMT: Hip, Rehab Eval   Hip Flexion - Left Side (3+/5) fair plus, left   Hip ABduction - Left Side (2+/5) poor plus, left   Hip Flexion - Right Side (5/5) normal,right   Hip ABduction - Right Side (5/5) normal,right   MMT: Knee, Rehab Eval   Knee Flexion - Left Side (4/5) good, left   Knee Extension - Left Side (2+/5) poor plus, left   Knee Flexion - Right Side (5/5) normal,right   Knee Extension - Right Side (5/5) normal,right   MMT: Ankle, Rehab Eval   Ankle Dorsiflexion - Left Side (0/5) zero, left   Ankle Plantarflexion - Left Side (3/5) fair, left   Ankle Dorsiflexion - Right Side (5/5) normal,right   Ankle Plantarflexion - Right Side (5/5) normal,right   Bed Mobility   Bed Mobility Comments independent- uses gravity to assist her    Transfer Skills   Transfer Comments sit<>stand with heavy UE support from R side- uses cane once standing for support    Locomotion   Wheel Chair Mobility Comments arrived in w/c that was at the clinic- does not usually use wheelchair for mobility    Gait   Gait Comments ambulates with quad cane- slow gait speed, decreased step length on RLE with increased stance time on RLE, poor foot clearance on L with circumduction at L hip to get through swing phase, leaning onto quad cane and reaching out at walls for added support    Gait Special Tests   Gait Special Tests 25 FOOT TIMED WALK   Gait Special Tests 25 Foot Timed Walk   Seconds 23.50    Steps 20 Steps   Comments quad cane    Balance   Balance Comments able to sit on mat table without back support independently; needs 1  UE support in standing for balance with poor WS abilities    Balance Special Tests   Balance Special Tests Timed up and go   Balance Special Tests Timed Up and Go   Seconds 39.03 Seconds   Comments quad cane    Sensory Examination   Sensory Perception Comments reports tingling/numbness sensation in L UE from shoulder down to finger tips intermittently    Coordination   Coordination Comments decreased coordination on LLE as noted with gait activites    Muscle Tone   Muscle Tone Comments spastic at LLE at knee flexors - unable to perform knee flexion on command-- with gentle pressure PT could get LLE into flexion-- also spasticity noted at L hip flexors    Planned Therapy Interventions   Planned Therapy Interventions balance training;bed mobility training;gait training;motor coordination training;neuromuscular re-education;orthotic fitting/training;ROM;strengthening;stretching;manual therapy;transfer training   Clinical Impression   Criteria for Skilled Therapeutic Interventions Met yes, treatment indicated   PT Diagnosis decreased activity tolerance, weakness, balance impairments   Influenced by the following impairments weakness and decreased ROM, spasticity/tone, decreased activity tolerance, fatigue, gait impairments, pain    Functional limitations due to impairments increased risk of falls in home, decreased independence with daily tasks, decrease ease of mobility at home and in community    Clinical Presentation Evolving/Changing   Clinical Presentation Rationale changing level of fatigue, several PT impairments and comorbidites, clinical judgement    Clinical Decision Making (Complexity) Moderate complexity   Therapy Frequency 2 times/Week   Predicted Duration of Therapy Intervention (days/wks) up to 90 days    Risk & Benefits of therapy have been explained Yes   Patient, Family & other staff in agreement with plan of care Yes   Clinical Impression Comments Patient presents with weakness and tone/spasticity on  L side and presents with increased safety risk with all mobility d/t these impairments. Will benefit from OP PT to address impairments and educate on disease and fatigue/energy conservation.    GOALS   PT Eval Goals 1;2;3;4;5   Goal 1   Goal Identifier gait speed    Goal Description Patient will decrease time on 25 foot walk test by 10 secs and or <18 steps with use of devices as needed in order to improve her speed and safety with gait for community mobility.    Target Date 11/22/17   Goal 2   Goal Identifier energy conservation    Goal Description Patient will state 5 strategies that she can use to conserve energy in order to decrease her overall fatigue and acoomplish tasks that need to be completed each day.    Target Date 11/22/17   Goal 3   Goal Identifier TUG   Goal Description Patient will improve score on TUG to < 25 seconds in order to show improve ease of mobility in home when getting up to use the restroom and decrease her falls risk.    Target Date 11/22/17   Goal 4   Goal Identifier sit<>stand   Goal Description Patient will perform 10 sit<>stands in 30 secs without use of UEs from 19'' surface in order to show improvement in strength in LEs and ability to get from lower surface without UE use    Target Date 11/22/17   Goal 5   Goal Identifier HEP   Goal Description Patient will demonstrate independence with HEP for strength, balance and gait in order to improve her mobility outside of PT sessions.    Target Date 11/22/17   Total Evaluation Time   Total Evaluation Time (Minutes) 40

## 2017-08-26 NOTE — PROGRESS NOTES
08/25/17 1400   Quick Adds   Type of Visit Initial OP PT Evaluation   General Information   Start of Care Date 08/25/17   Referring Physician Barrera Shannon MD   Orders Evaluate and Treat as Indicated   Order Date 08/01/17   Medical Diagnosis Multiple Sclerosis    Onset of illness/injury or Date of Surgery (~4 years ago )   Precautions/Limitations fall precautions   Surgical/Medical history reviewed Yes   Pertinent history of current problem (include personal factors and/or comorbidities that impact the POC) Diagnosed with MS ~4 years ago, primary progressive, but was having syptoms years before that. L side is weaker than R side (R side mostly full strength per patient). Started Ocreus infusion therapy every few weeks. Reports that she is very fatigued all of the time- does not move around much some day d/t this. Uses quad cane in community but does not use cane in the house- she usually just grabs onto furniture. Has done some PT in the past and things were getting stronger, but now she feels she has not been doing much for exercise and so she has not been getting much stronger. She also says she is very stiff on her L side- she sometimes gets some tingling and pain in L arm but it is intermittent. Feels very tight in her shoulders. She avoids walking long distances d/t fatigue. Daughter helps at home with somethings but patient tries to do everything she still can on her own.    Pertinent Visual History  wears reading glasses- no reports of double vision    Prior level of function comment independent with all transfers and gait but needs UE support from cane or wall    Previous/Current Treatment Physical Therapy   Improvement after PT Moderate   Current Community Support Family/friend caregiver   Patient role/Employment history Disabled   Living environment House/[facility]   Home/Community Accessibility Comments 6 stairs at home- rails on 1 side; lives with daughter who is 34 years ago.    Current  Assistive Devices Front Wheeled Walker;Quad Cane   ADL Devices Shower/Tub Grab Bar   Assistive Devices Comments has quad cane- does not use all the time at home    Patient/Family Goals Statement decreased tightness in L side and improve her strength, improve her walking, less tightness in shoulders    Fall Risk Screen   Fall screen completed by PT   Per patient - Fall 2 or more times in past year? Yes   Per patient - Fall with injury in past year? Yes   Timed Up and Go score (seconds) 39.03   (quad cane)   Is patient a fall risk? Yes   Fall screen comments increaesed fall risk d/t difficulty clearing her L foot during gait and balance impairments    Pain   Pain comments sometimes get L arm pain- not happening this session but describes as a sensation that goes down her arm starting from her neck.    Cognitive Status Examination   Orientation orientation to person, place and time   Level of Consciousness alert   Follows Commands and Answers Questions 100% of the time   Personal Safety and Judgment intact   Memory intact   Integumentary   Integumentary Comments mild swelling in LEs- no pitting    Posture   Posture Comments mild foward shoulder posture in sitting; increased muscle tightness and use of upper trap muscles at B shoulders    Range of Motion (ROM)   ROM Comment DF to neutral on LLE- ROM is WFL on LUE and LLE and but has tone/spasticity in LLE that prevents her from achieving full active ROM but with assist from PT with passive ROM is able to achieve.    MMT: Shoulder, Rehab Eval   Shoulder Flexion - Left Side (5/5) normal,left   Shoulder ABduction - Left Side (5/5) normal,left   Shoulder Internal Rotation - Left Side (4/5) good, left   Shoulder External Rotation - Left Side (4/5) good, left   Shoulder Flexion - Right Side (5/5) normal,right   Shoulder ABduction - Right Side (5/5) normal,right   Shoulder Internal Rotation - Right Side (5/5) normal,right   Shoulder External Rotation - Right Side (5/5)  normal,right   MMT: Elbow/Forearm, Rehab Eval   Elbow Flexion - Left Side (5/5) normal,left   Elbow Extension - Left Side (5/5) normal,left   Elbow Flexion - Right Side (5/5) normal,right   Elbow Extension - Right Side (5/5) normal,right   Hand Strength   Hand Muscle Testing Results did not formally-- reduced  strength on L vs R    MMT: Trunk, Rehab Eval   Trunk Muscle Testing Results decreaed core strength    MMT: Hip, Rehab Eval   Hip Flexion - Left Side (3+/5) fair plus, left   Hip ABduction - Left Side (2+/5) poor plus, left   Hip Flexion - Right Side (5/5) normal,right   Hip ABduction - Right Side (5/5) normal,right   MMT: Knee, Rehab Eval   Knee Flexion - Left Side (4/5) good, left   Knee Extension - Left Side (2+/5) poor plus, left   Knee Flexion - Right Side (5/5) normal,right   Knee Extension - Right Side (5/5) normal,right   MMT: Ankle, Rehab Eval   Ankle Dorsiflexion - Left Side (0/5) zero, left   Ankle Plantarflexion - Left Side (3/5) fair, left   Ankle Dorsiflexion - Right Side (5/5) normal,right   Ankle Plantarflexion - Right Side (5/5) normal,right   Bed Mobility   Bed Mobility Comments independent- uses gravity to assist her    Transfer Skills   Transfer Comments sit<>stand with heavy UE support from R side- uses cane once standing for support    Locomotion   Wheel Chair Mobility Comments arrived in w/c that was at the clinic- does not usually use wheelchair for mobility    Gait   Gait Comments ambulates with quad cane- slow gait speed, decreased step length on RLE with increased stance time on RLE, poor foot clearance on L with circumduction at L hip to get through swing phase, leaning onto quad cane and reaching out at walls for added support    Gait Special Tests   Gait Special Tests 25 FOOT TIMED WALK   Gait Special Tests 25 Foot Timed Walk   Seconds 23.50    Steps 20 Steps   Comments quad cane    Balance   Balance Comments able to sit on mat table without back support independently; needs 1  UE support in standing for balance with poor WS abilities    Balance Special Tests   Balance Special Tests Timed up and go   Balance Special Tests Timed Up and Go   Seconds 39.03 Seconds   Comments quad cane    Sensory Examination   Sensory Perception Comments reports tingling/numbness sensation in L UE from shoulder down to finger tips intermittently    Coordination   Coordination Comments decreased coordination on LLE as noted with gait activites    Muscle Tone   Muscle Tone Comments spastic at LLE at knee flexors - unable to perform knee flexion on command-- with gentle pressure PT could get LLE into flexion-- also spasticity noted at L hip flexors    Planned Therapy Interventions   Planned Therapy Interventions balance training;bed mobility training;gait training;motor coordination training;neuromuscular re-education;orthotic fitting/training;ROM;strengthening;stretching;manual therapy;transfer training   Clinical Impression   Criteria for Skilled Therapeutic Interventions Met yes, treatment indicated   PT Diagnosis decreased activity tolerance, weakness, balance impairments   Influenced by the following impairments weakness and decreased ROM, spasticity/tone, decreased activity tolerance, fatigue, gait impairments, pain    Functional limitations due to impairments increased risk of falls in home, decreased independence with daily tasks, decrease ease of mobility at home and in community    Clinical Presentation Evolving/Changing   Clinical Presentation Rationale changing level of fatigue, several PT impairments and comorbidites, clinical judgement    Clinical Decision Making (Complexity) Moderate complexity   Therapy Frequency 2 times/Week   Predicted Duration of Therapy Intervention (days/wks) up to 90 days    Risk & Benefits of therapy have been explained Yes   Patient, Family & other staff in agreement with plan of care Yes   Clinical Impression Comments Patient presents with weakness and tone/spasticity on  L side and presents with increased safety risk with all mobility d/t these impairments. Will benefit from OP PT to address impairments and educate on disease and fatigue/energy conservation.    GOALS   PT Eval Goals 1;2;3;4;5   Goal 1   Goal Identifier gait speed    Goal Description Patient will decrease time on 25 foot walk test by 10 secs and or <18 steps with use of devices as needed in order to improve her speed and safety with gait for community mobility.    Target Date 11/22/17   Goal 2   Goal Identifier energy conservation    Goal Description Patient will state 5 strategies that she can use to conserve energy in order to decrease her overall fatigue and acoomplish tasks that need to be completed each day.    Target Date 11/22/17   Goal 3   Goal Identifier TUG   Goal Description Patient will improve score on TUG to < 25 seconds in order to show improve ease of mobility in home when getting up to use the restroom and decrease her falls risk.    Target Date 11/22/17   Goal 4   Goal Identifier sit<>stand   Goal Description Patient will perform 10 sit<>stands in 30 secs without use of UEs from 19'' surface in order to show improvement in strength in LEs and ability to get from lower surface without UE use    Target Date 11/22/17   Goal 5   Goal Identifier HEP   Goal Description Patient will demonstrate independence with HEP for strength, balance and gait in order to improve her mobility outside of PT sessions.    Target Date 11/22/17   Total Evaluation Time   Total Evaluation Time (Minutes) 40

## 2017-08-29 ENCOUNTER — INFUSION THERAPY VISIT (OUTPATIENT)
Dept: INFUSION THERAPY | Facility: CLINIC | Age: 57
End: 2017-08-29
Payer: COMMERCIAL

## 2017-08-29 VITALS
TEMPERATURE: 97.3 F | HEART RATE: 65 BPM | DIASTOLIC BLOOD PRESSURE: 64 MMHG | WEIGHT: 142.1 LBS | BODY MASS INDEX: 23.65 KG/M2 | RESPIRATION RATE: 16 BRPM | SYSTOLIC BLOOD PRESSURE: 103 MMHG | OXYGEN SATURATION: 100 %

## 2017-08-29 DIAGNOSIS — G35 MULTIPLE SCLEROSIS (H): Primary | ICD-10-CM

## 2017-08-29 PROCEDURE — 96415 CHEMO IV INFUSION ADDL HR: CPT | Performed by: NURSE PRACTITIONER

## 2017-08-29 PROCEDURE — 99207 ZZC NO CHARGE LOS: CPT

## 2017-08-29 PROCEDURE — 96375 TX/PRO/DX INJ NEW DRUG ADDON: CPT | Performed by: NURSE PRACTITIONER

## 2017-08-29 PROCEDURE — 96413 CHEMO IV INFUSION 1 HR: CPT | Performed by: NURSE PRACTITIONER

## 2017-08-29 RX ORDER — METHYLPREDNISOLONE SODIUM SUCCINATE 125 MG/2ML
125 INJECTION, POWDER, LYOPHILIZED, FOR SOLUTION INTRAMUSCULAR; INTRAVENOUS ONCE
Status: COMPLETED | OUTPATIENT
Start: 2017-08-29 | End: 2017-08-29

## 2017-08-29 RX ORDER — ACETAMINOPHEN 325 MG/1
650 TABLET ORAL ONCE
Status: CANCELLED | OUTPATIENT
Start: 2017-08-29

## 2017-08-29 RX ORDER — DIPHENHYDRAMINE HCL 25 MG
50 CAPSULE ORAL ONCE
Status: CANCELLED | OUTPATIENT
Start: 2017-08-29 | End: 2017-08-29

## 2017-08-29 RX ORDER — ACETAMINOPHEN 325 MG/1
650 TABLET ORAL ONCE
Status: COMPLETED | OUTPATIENT
Start: 2017-08-29 | End: 2017-08-29

## 2017-08-29 RX ORDER — DIPHENHYDRAMINE HCL 25 MG
50 CAPSULE ORAL ONCE
Status: COMPLETED | OUTPATIENT
Start: 2017-08-29 | End: 2017-08-29

## 2017-08-29 RX ORDER — METHYLPREDNISOLONE SODIUM SUCCINATE 125 MG/2ML
125 INJECTION, POWDER, LYOPHILIZED, FOR SOLUTION INTRAMUSCULAR; INTRAVENOUS ONCE
Status: CANCELLED | OUTPATIENT
Start: 2017-08-29

## 2017-08-29 RX ADMIN — ACETAMINOPHEN 650 MG: 325 TABLET ORAL at 08:26

## 2017-08-29 RX ADMIN — METHYLPREDNISOLONE SODIUM SUCCINATE 125 MG: 125 INJECTION INTRAMUSCULAR; INTRAVENOUS at 08:40

## 2017-08-29 RX ADMIN — Medication 50 MG: at 08:26

## 2017-08-29 RX ADMIN — Medication 250 ML: at 08:36

## 2017-08-29 ASSESSMENT — PAIN SCALES - GENERAL: PAINLEVEL: NO PAIN (0)

## 2017-08-29 NOTE — PROGRESS NOTES
Infusion Nursing Note:  Colletta Dwen Sorrell presents today for Ocrevus.    Patient seen by provider today: No   present during visit today: Not Applicable.    Note: Ocrevus started at 30 ml/hr and rate was increased 30ml/hr every 30 minutes to maximum rate of 180 ml/hr.     Intravenous Access:  Peripheral IV placed.    Treatment Conditions:  Ocrevus Infusion Checklist:    ~~~ NOTE: If the patient answers yes to any of the questions below, hold the infusion and contact ordering provider or on-call provider.    1. Have you recently had an elevated temperature, fever, chills, productive cough, coughing for 3 weeks or longer or hemoptysis,  abnormal vital signs, night sweats,  chest pain or have you noticed a decrease in your appetite, unexplained weight loss or fatigue? No  2. Do you have any open wounds or new incisions? No  3. Do you have any recent or upcoming hospitalizations, surgeries or dental procedures? No  4. Do you currently have or recently have had any signs of illness or infection or are you on any antibiotics? No  5. Have you had any new, sudden or worsening abdominal pain? No  6. Have you or anyone in your household received a live vaccination in the past 4 weeks? Please note:  No live vaccines while on biologic/chemotherapy until 6 months after the last treatment.  Patient can receive the flu vaccine (shot only) and the pneumovax.  It is optimal for the patient to get these vaccines mid cycle, but they can be given at any time as long as it is not on the day of the infusion. No  7. Have you recently been diagnosed with any new nervous system diseases (ie. Multiple sclerosis, Guillain [place], seizures, neurological changes) or cancer diagnosis? Are you on any form of radiation or chemotherapy? No  8. Are you pregnant or breast feeding or do you have plans of pregnancy in the future? No  9. Have you been having any signs of worsening depression or suicidal ideations?  (benlysta only)  No  10. Have there been any other new onset medical symptoms? No    Post Infusion Assessment:  Patient tolerated infusion without incident.  Patient was observed 60 minutes post Ocrevus per protocol.  Blood return noted pre and post infusion.  Site patent and intact, free from redness, edema or discomfort.  Access discontinued per protocol.  Rheumatology Post Infusion: POST-INFUSION OF BIOLOGICAL MEDICATION: Ocrevus  Reviewed with patient.  Given biologic medication or medication hand-out. Inform patient if any fever, chills or signs of infection, new symptoms, abdominal pain, heart palpitations, shortness of breath, reaction, weakness, neurological changes, seek medical attention immediately and should not receive infusions. No live virus vaccines prior to or during treatment or up to 6 months post infusion. If the patient has an upcoming procedure or surgery, this should be discussed with the rheumatologist and surgeon or provider.    Discharge Plan:   Patient discharged in stable condition accompanied by: self and transportation.  Departure Mode: Wheelchair.    Mami Anderson RN

## 2017-08-29 NOTE — PATIENT INSTRUCTIONS
Ocrevus reviewed with patient. Given biologic medication or medication hand-out. Inform patient if any fever, chills or signs of infection, new symptoms, abdominal pain, heart palpitations, shortness of breath, reaction, weakness, neurological changes, seek medical attention immediately and should not receive infusions. No live virus vaccines prior to or during treatment or up to 6 months post infusion. If the patient has an upcoming procedure or surgery, this should be discussed with the rheumatologist and surgeon or provider.

## 2017-08-29 NOTE — MR AVS SNAPSHOT
After Visit Summary   8/29/2017    Colletta Dwen Sorrell    MRN: [id number]           Patient Information     Date Of Birth          1960        Visit Information        Provider Department      8/29/2017 8:00 AM [address]        Today's Diagnoses     Multiple sclerosis (H)    -  1      Care Instructions      Ocrevus reviewed with patient. Given biologic medication or medication hand-out. Inform patient if any fever, chills or signs of infection, new symptoms, abdominal pain, heart palpitations, shortness of breath, reaction, weakness, neurological changes, seek medical attention immediately and should not receive infusions. No live virus vaccines prior to or during treatment or up to 6 months post infusion. If the patient has an upcoming procedure or surgery, this should be discussed with the rheumatologist and surgeon or provider.            Follow-ups after your visit        Your next 10 appointments already scheduled     Aug 31, 2017  9:15 AM CDT   Neuro Treatment with Helena Sunshine PT   [facility] ([facility]    [address] [facility] [address]   [phone]            Sep 05, 2017 10:30 AM CDT   Neuro Treatment with Latoya Montesinos, PT   [facility] ([facility])    [address] [facility] [address]   [phone]            Sep 08, 2017  2:30 PM CDT   Neuro Treatment with Clemencia Montiel, PT   [facility] ([facility])    [address] [facility] [address]   [phone]            Sep 11, 2017  9:30 AM CDT   Neuro Treatment with Clemencia Montiel, PT   [facility] ([facility])    [address] [facility] [address]   [phone]            Sep 14, 2017 10:15 AM CDT   Neuro Treatment with Clemencia Montiel, PT   [facility] ([facility]  [place] [facility]    [address] [facility] [address]   [phone]            Sep 18, 2017  1:45 PM CDT   Neuro Treatment with Clemencia Callisto, PT   [facility] [place]    [address] [facility] [address]   [phone]            Sep 21, 2017 11:15 AM CDT   Neuro Treatment with Clemencia Callisto, PT   [facility] [place]    [address] [facility] [address]   [phone]            Sep 25, 2017 10:15 AM CDT   Neuro Treatment with Clemencia Callisto, PT   [facility] [place]    [address] [facility] [address]   [phone]            Sep 28, 2017 11:15 AM CDT   Neuro Treatment with Clemencia Callisto, PT   [facility] ([facility]    [address] [facility] [address]   [phone]            Jan 09, 2018 10:00 AM CST   (Arrive by 9:45 AM)   Return Multiple Sclerosis with Barrera Shannon MD   [facility] Multiple Sclerosis ([facility])    [address] [place] [id number] [phone]              Future tests that were ordered for you today     Open Standing Orders        Priority Remaining Interval Expires Ordered    Notify Physician Routine [id number] PRN  8/29/2017            Who to contact     If you have questions or need follow up information about today's clinic visit or your schedule please contact [facility] directly at [phone].  Normal or non-critical lab and imaging results will be communicated to you by MyChart, letter or phone within 4 business days after the clinic has received the results. If you do not hear from us within 7 days, please contact the clinic through MyChart or phone. If you have a critical or abnormal lab result, we will notify you by phone as soon as possible.  Submit refill  requests through Moodswing or call your pharmacy and they will forward the refill request to us. Please allow 3 business days for your refill to be completed.          Additional Information About Your Visit        Moodswing Information     Moodswing is an electronic gateway that provides easy, online access to your medical records. With Moodswing, you can request a clinic appointment, read your test results, renew a prescription or communicate with your care team.     To sign up for Moodswing visit the website at [email or web address]   You will be asked to enter the access code listed below, as well as some personal information. Please follow the directions to create your username and password.     Your access code is: [email or web address]  Expires: 2017  6:30 AM     Your access code will  in 90 days. If you need help or a new code, please contact your [facility] or call [phone] for assistance.        Care EveryWhere ID     This is your Care EveryWhere ID. This could be used by other organizations to access your [place] medical records  [id number]        Your Vitals Were     Pulse Temperature Respirations Pulse Oximetry BMI (Body Mass Index)       65 97.3  F (36.3  C) (Oral) 16 100% 23.65 kg/m2        Blood Pressure from Last 3 Encounters:   17 103/64   08/15/17 113/72   17 102/69    Weight from Last 3 Encounters:   17 64.5 kg (142 lb 1.6 oz)   08/15/17 74.5 kg (164 lb 3.2 oz)   17 63.5 kg (140 lb)              Today, you had the following     No orders found for display       Primary Care Provider Office Phone # Fax #    Amrita H SURENDRA Rossphilip [phone]       [facility] [address]  [facility] [address]        Equal Access to Services     LEIDY SHUKLA : Min Bell, wajonny luqadaha, qaybta kaalmatravis ware, eboni ramirez. [facility] [phone].    ATENCIÓN: Si christiela español,  tiene a vazquez disposición servicios gratuitos de asistencia lingüística. Matthew hatfield [phone].    We comply with applicable federal civil rights laws and [place] laws. We do not discriminate on the basis of race, color, national origin, age, disability sex, sexual orientation or gender identity.            Thank you!     Thank you for choosing [facility]  for your care. Our goal is always to provide you with excellent care. Hearing back from our patients is one way we can continue to improve our services. Please take a few minutes to complete the written survey that you may receive in the mail after your visit with us. Thank you!             Your Updated Medication List - Protect others around you: Learn how to safely use, store and throw away your medicines at www.disposemymeds.org.          This list is accurate as of: 8/29/17  3:16 PM.  Always use your most recent med list.                   Brand Name Dispense Instructions for use Diagnosis    cholecalciferol 1000 UNIT tablet    vitamin D     Take 1 tablet by mouth daily.    Multiple sclerosis (H)       Glatiramer Acetate 40 MG/ML Sosy      Inject 40 mg Subcutaneous    Multiple sclerosis (H)       MAGNESIUM PO       Multiple sclerosis (H)       melatonin 3 MG tablet      Take 3 mg by mouth    Multiple sclerosis (H)       MULTIVITAMIN ADULT PO       Multiple sclerosis (H)       VITAMIN B COMPLEX PO       Multiple sclerosis (H)

## 2017-09-05 ENCOUNTER — HOSPITAL ENCOUNTER (OUTPATIENT)
Dept: PHYSICAL THERAPY | Facility: CLINIC | Age: 57
Setting detail: THERAPIES SERIES
End: 2017-09-05
Attending: PSYCHIATRY & NEUROLOGY
Payer: COMMERCIAL

## 2017-09-05 PROCEDURE — 97116 GAIT TRAINING THERAPY: CPT | Mod: GP | Performed by: PHYSICAL THERAPIST

## 2017-09-05 PROCEDURE — 97110 THERAPEUTIC EXERCISES: CPT | Mod: GP | Performed by: PHYSICAL THERAPIST

## 2017-09-05 PROCEDURE — 40000719 ZZHC STATISTIC PT DEPARTMENT NEURO VISIT: Performed by: PHYSICAL THERAPIST

## 2017-09-05 NOTE — DISCHARGE INSTRUCTIONS
9/5/17  - For inside and outside you can use your walker to help conserve energy.   - Begin ankle exercises to stretch and strengthen the L foot!

## 2017-09-05 NOTE — IP AVS SNAPSHOT
MRN:[id number]                      After Visit Summary   9/5/2017    Colletta Dwen Sorrell    MRN: [id number]           Visit Information        Provider Department      9/5/2017 10:30 AM Latoya Montesinos, PT [facility]        Your next 10 appointments already scheduled     Sep 08, 2017  2:30 PM CDT   Neuro Treatment with Clemencia Callisto, PT   [facility] ([facility])    [address] [facility] [address]   [phone]            Sep 11, 2017  9:30 AM CDT   Neuro Treatment with Clemencia Callisto, PT   [facility] ([facility])    [address] [facility] [address]   [phone]            Sep 14, 2017 10:15 AM CDT   Neuro Treatment with Clemencia Callisto, PT   [facility] ([facility])    [address] [facility] [address]   [phone]            Sep 18, 2017  1:45 PM CDT   Neuro Treatment with Clemencia Callisto, PT   [facility] ([facility])    [address] [facility] [address]   [phone]            Sep 21, 2017 11:15 AM CDT   Neuro Treatment with Clemencia Callisto, PT   [facility] ([facility])    [address] [facility] [address]   [phone]            Sep 25, 2017 10:15 AM CDT   Neuro Treatment with Clemencia Callisto, PT   [facility] ([facility])    [address] [facility] [address]   [phone]            Sep 28, 2017 11:15 AM CDT   Neuro Treatment with Clemencia Callisto, PT   [facility] ([facility])    [address] [facility] [address]   [phone]            Jan 09, 2018 10:00 AM CST   (Arrive by 9:45 AM)   Return Multiple Sclerosis with Barrera Shannon MD   [facility] Multiple Sclerosis ([facility])    [address] [address] [phone]                Further instructions from your care team       17  - For inside and outside you can use your walker to help conserve energy.   - Begin ankle exercises to stretch and strengthen the L foot!         MyChart Information     Process Relations lets you send messages to your doctor, view your test results, renew your prescriptions, schedule appointments and more. To sign up, go to [email or web address] . Click on \"Log in\" on the left side of the screen, which will take you to the Welcome page. Then click on \"Sign up Now\" on the right side of the page.     You will be asked to enter the access code listed below, as well as some personal information. Please follow the directions to create your username and password.     Your access code is: [email or web address]  Expires: 2017  6:30 AM     Your access code will  in 90 days. If you need help or a new code, please call your [place] clinic or [phone].        Care EveryWhere ID     This is your Care EveryWhere ID. This could be used by other organizations to access your [place] medical records  [id number]        Equal Access to Services     LEIDY SHUKLA : Min Bell, waaxda lutenaadaha, qaybta kaalmada chaz, eboni ramirez. So [facility] [phone].    ATENCIÓN: Si habla español, tiene a vazquez disposición servicios gratuitos de asistencia lingüística. Llame al [phone].    We comply with applicable federal civil rights laws and [place] laws. We do not discriminate on the basis of race, color, national origin, age, disability sex, sexual orientation or gender identity.            "

## 2017-09-07 NOTE — TELEPHONE ENCOUNTER
Colletta called again regarding the DMV letter; I have printed 2 more copies, faxing one to the DMV and placing another one in the mail to her home.    Tata Roberts, MS RN Care Coordinator

## 2017-09-12 ENCOUNTER — TELEPHONE (OUTPATIENT)
Dept: NEUROLOGY | Facility: CLINIC | Age: 57
End: 2017-09-12

## 2017-09-12 NOTE — TELEPHONE ENCOUNTER
"There is not really any such \"x-ray\".  If it's something that's really bothering her, I'd suggest she come in to see [facility]ony about it (to see if she needs EMG or something).  Otherwise, we can just address it at our next appt.  "

## 2017-09-12 NOTE — TELEPHONE ENCOUNTER
"I received the following message from the triage nurse:    Patient sees Dr Shannon. Requesting an x-ray order to evaluate for a pinched nerve. She reports having a \"sensation, not really pain\" down her left arm and into her hand that comes and goes. She states she has had this sensation for a while and always assumed it was MS related. She is now wondering if it is something else.     Dr. Shannon, what do you think? Thank you.    Tata Roberts, MS RN Care Coordinator    "

## 2017-09-15 NOTE — TELEPHONE ENCOUNTER
"I spoke with Colletta and gave her Dr. Shannon's input. She will let us know if she wants to move forward with Harm[facility] or wait until appointment with Dr. Shannon in January. She is currently recovering from a \"virus\" but offers no other concerns.     Mahsa Mays, RN Care Coordinator    "

## 2017-09-18 ENCOUNTER — HOSPITAL ENCOUNTER (OUTPATIENT)
Dept: PHYSICAL THERAPY | Facility: CLINIC | Age: 57
Setting detail: THERAPIES SERIES
End: 2017-09-18
Attending: PSYCHIATRY & NEUROLOGY
Payer: COMMERCIAL

## 2017-09-18 PROCEDURE — 97110 THERAPEUTIC EXERCISES: CPT | Mod: GP | Performed by: PHYSICAL THERAPIST

## 2017-09-18 PROCEDURE — 97116 GAIT TRAINING THERAPY: CPT | Mod: GP | Performed by: PHYSICAL THERAPIST

## 2017-09-18 PROCEDURE — 40000719 ZZHC STATISTIC PT DEPARTMENT NEURO VISIT: Performed by: PHYSICAL THERAPIST

## 2017-09-21 ENCOUNTER — HOSPITAL ENCOUNTER (OUTPATIENT)
Dept: PHYSICAL THERAPY | Facility: CLINIC | Age: 57
Setting detail: THERAPIES SERIES
End: 2017-09-21
Attending: PSYCHIATRY & NEUROLOGY
Payer: COMMERCIAL

## 2017-09-21 PROCEDURE — 97116 GAIT TRAINING THERAPY: CPT | Mod: GP | Performed by: PHYSICAL THERAPIST

## 2017-09-21 PROCEDURE — 40000719 ZZHC STATISTIC PT DEPARTMENT NEURO VISIT: Performed by: PHYSICAL THERAPIST

## 2017-09-21 PROCEDURE — 97110 THERAPEUTIC EXERCISES: CPT | Mod: GP | Performed by: PHYSICAL THERAPIST

## 2017-09-22 DIAGNOSIS — G35 MS (MULTIPLE SCLEROSIS) (H): Primary | ICD-10-CM

## 2017-09-28 ENCOUNTER — HOSPITAL ENCOUNTER (OUTPATIENT)
Dept: PHYSICAL THERAPY | Facility: CLINIC | Age: 57
Setting detail: THERAPIES SERIES
End: 2017-09-28
Attending: PSYCHIATRY & NEUROLOGY
Payer: COMMERCIAL

## 2017-09-28 PROCEDURE — 40000719 ZZHC STATISTIC PT DEPARTMENT NEURO VISIT: Performed by: PHYSICAL THERAPIST

## 2017-09-28 PROCEDURE — 97112 NEUROMUSCULAR REEDUCATION: CPT | Mod: GP | Performed by: PHYSICAL THERAPIST

## 2017-09-28 PROCEDURE — 97110 THERAPEUTIC EXERCISES: CPT | Mod: GP | Performed by: PHYSICAL THERAPIST

## 2017-10-06 ENCOUNTER — HOSPITAL ENCOUNTER (OUTPATIENT)
Dept: PHYSICAL THERAPY | Facility: CLINIC | Age: 57
Setting detail: THERAPIES SERIES
End: 2017-10-06
Attending: FAMILY MEDICINE
Payer: COMMERCIAL

## 2017-10-06 PROCEDURE — 97112 NEUROMUSCULAR REEDUCATION: CPT | Mod: GP | Performed by: PHYSICAL THERAPIST

## 2017-10-06 PROCEDURE — 97116 GAIT TRAINING THERAPY: CPT | Mod: GP | Performed by: PHYSICAL THERAPIST

## 2017-10-06 PROCEDURE — 40000719 ZZHC STATISTIC PT DEPARTMENT NEURO VISIT: Performed by: PHYSICAL THERAPIST

## 2017-10-17 ENCOUNTER — MEDICAL CORRESPONDENCE (OUTPATIENT)
Dept: HEALTH INFORMATION MANAGEMENT | Facility: CLINIC | Age: 57
End: 2017-10-17

## 2017-10-19 ENCOUNTER — HOSPITAL ENCOUNTER (OUTPATIENT)
Dept: PHYSICAL THERAPY | Facility: CLINIC | Age: 57
Setting detail: THERAPIES SERIES
End: 2017-10-19
Attending: FAMILY MEDICINE
Payer: COMMERCIAL

## 2017-10-19 PROCEDURE — 97116 GAIT TRAINING THERAPY: CPT | Mod: GP | Performed by: PHYSICAL THERAPIST

## 2017-10-19 PROCEDURE — 97110 THERAPEUTIC EXERCISES: CPT | Mod: GP | Performed by: PHYSICAL THERAPIST

## 2017-10-19 PROCEDURE — 40000719 ZZHC STATISTIC PT DEPARTMENT NEURO VISIT: Performed by: PHYSICAL THERAPIST

## 2017-10-19 NOTE — IP AVS SNAPSHOT
"                  MRN:[id number]                      After Visit Summary   10/19/2017    Colletta Dwen Sorrell    MRN: [id number]           Visit Information        Provider Department      10/19/2017  1:00 PM Clemencia Montiel, PT [facility]        Your next 10 appointments already scheduled     Oct 27, 2017 10:30 AM CDT   Neuro Treatment with Clemencia Montiel, PT   [facility] [place]    [address] [facility] [address]   [phone]            Oct 31, 2017  1:00 PM CDT   Neuro Treatment with Clemencia Montiel, PT   [facility] [place]    [address] [facility] [address]   [phone]            Nov 02, 2017 10:15 AM CDT   Neuro Treatment with Clemencia Montiel, PT   [facility] [place]    [address] [facility] [address]   [phone]            Jan 09, 2018 10:00 AM CST   (Arrive by 9:45 AM)   Return Multiple Sclerosis with Barrera Shannon MD   [facility] Multiple Sclerosis ([facility])    [address] [place] [address] [phone]                Further instructions from your care team       For physical therapy:     Next Friday is when Luke from orthotics is coming to fit you for your brace-- wear your black tennis shoes and bring another pair of shoes if you want the brace fitted for those too.     Try new seated exercises     Keep trying to \"kick\" your leg forward more when walking to get your toes through      Thanks,  Matt, PT, DPT      MyChart Information     GetJobhart lets you send messages to your doctor, view your test results, renew your prescriptions, schedule appointments and more. To sign up, go to [email or web address] . Click on \"Log in\" on the left side of the screen, which will take you to the Welcome page. Then click on \"Sign up Now\" on the right " side of the page.     You will be asked to enter the access code listed below, as well as some personal information. Please follow the directions to create your username and password.     Your access code is: [email or web address]  Expires: 2018  5:14 PM     Your access code will  in 90 days. If you need help or a new code, please call your [place] clinic or [phone].        Care EveryWhere ID     This is your Care EveryWhere ID. This could be used by other organizations to access your [place] medical records  [id number]        Equal Access to Services     [facility] : Hadii shon Bell, jeffrey zhao, danny ware, eboni ramirez. So [facility] [phone].    ATENCIÓN: Si habla español, tiene a vazquez disposición servicios gratuitos de asistencia lingüística. Llame al [phone].    We comply with applicable federal civil rights laws and [place] laws. We do not discriminate on the basis of race, color, national origin, age, disability, sex, sexual orientation, or gender identity.

## 2017-10-19 NOTE — DISCHARGE INSTRUCTIONS
"For physical therapy:     Next Friday is when Luke from orthotics is coming to fit you for your brace-- wear your black tennis shoes and bring another pair of shoes if you want the brace fitted for those too.     Try new seated exercises     Keep trying to \"kick\" your leg forward more when walking to get your toes through      Thanks,  Matt, PT, DPT    "

## 2017-10-24 ENCOUNTER — TELEPHONE (OUTPATIENT)
Dept: NEUROLOGY | Facility: CLINIC | Age: 57
End: 2017-10-24

## 2017-10-24 NOTE — TELEPHONE ENCOUNTER
I'm OK with ordering MRI of the cervical spine, but not of shoulder and hand.  (She would need that to be ordered by orthopedics or someone familiar with whether or not imaging of those joints is indicated).

## 2017-10-24 NOTE — TELEPHONE ENCOUNTER
I received the following message from the triage nurse:    Pt calling requesting an MRI due to pain in her neck, shoulder and hand. She said that if she allows it, the affected areas can become temporarily paralyzed. She has an appointment on 10/27/17 in [place] and if possible would like to get it done there. Pt is requesting a call back at [phone].     I called Colletta back to get more information; She said that this isn't a new thing going on, but it has worsened; She has left shoulder pain/discomfort that radiates down her left arm and in to her hand, and is now affecting her neck area; She describes this as a shooting pain; She does not feel this is MS related, however, but thinks she may have a pinched nerve; Her PT out in [place] advised her to ask for an MRI; Collette is going to be at [facility] on Friday, so she is hoping to do an MRI that same day; Dr. Shannon, are you willing to order an MRI to evaluate her symptoms? Thank you.    Tata Roberts, MS RN Care Coordinator

## 2017-10-25 NOTE — TELEPHONE ENCOUNTER
Spoke with Colletta. At this time she is going to hold off on cervical MRI. She wants to follow up with orthopedics and see if they will order a more detailed work up. She will call back if she needs anything from us.    Mahsa Mays RN Care Coordinator

## 2017-10-27 ENCOUNTER — OFFICE VISIT (OUTPATIENT)
Dept: ORTHOPEDICS | Facility: CLINIC | Age: 57
End: 2017-10-27
Payer: COMMERCIAL

## 2017-10-27 ENCOUNTER — RADIANT APPOINTMENT (OUTPATIENT)
Dept: GENERAL RADIOLOGY | Facility: CLINIC | Age: 57
End: 2017-10-27
Attending: FAMILY MEDICINE
Payer: COMMERCIAL

## 2017-10-27 ENCOUNTER — HOSPITAL ENCOUNTER (OUTPATIENT)
Dept: PHYSICAL THERAPY | Facility: CLINIC | Age: 57
Setting detail: THERAPIES SERIES
End: 2017-10-27
Attending: FAMILY MEDICINE
Payer: COMMERCIAL

## 2017-10-27 VITALS
DIASTOLIC BLOOD PRESSURE: 71 MMHG | WEIGHT: 142 LBS | BODY MASS INDEX: 23.66 KG/M2 | HEIGHT: 65 IN | SYSTOLIC BLOOD PRESSURE: 115 MMHG | HEART RATE: 68 BPM

## 2017-10-27 DIAGNOSIS — M54.12 CERVICAL RADICULOPATHY: Primary | ICD-10-CM

## 2017-10-27 DIAGNOSIS — M54.12 CERVICAL RADICULOPATHY: ICD-10-CM

## 2017-10-27 PROCEDURE — 97116 GAIT TRAINING THERAPY: CPT | Mod: GP | Performed by: PHYSICAL THERAPIST

## 2017-10-27 PROCEDURE — 99203 OFFICE O/P NEW LOW 30 MIN: CPT | Performed by: FAMILY MEDICINE

## 2017-10-27 PROCEDURE — 40000719 ZZHC STATISTIC PT DEPARTMENT NEURO VISIT: Performed by: PHYSICAL THERAPIST

## 2017-10-27 PROCEDURE — 72040 X-RAY EXAM NECK SPINE 2-3 VW: CPT | Performed by: RADIOLOGY

## 2017-10-27 PROCEDURE — 97110 THERAPEUTIC EXERCISES: CPT | Mod: GP | Performed by: PHYSICAL THERAPIST

## 2017-10-27 RX ORDER — DALFAMPRIDINE 10 MG/1
1 TABLET, FILM COATED, EXTENDED RELEASE ORAL 2 TIMES DAILY
COMMUNITY
Start: 2017-09-11 | End: 2018-10-03

## 2017-10-27 ASSESSMENT — PAIN SCALES - GENERAL: PAINLEVEL: NO PAIN (0)

## 2017-10-27 NOTE — MR AVS SNAPSHOT
After Visit Summary   10/27/2017    Colletta Dwen Sorrell    MRN: [id number]           Patient Information     Date Of Birth          1960        Visit Information        Provider Department      10/27/2017 9:20 AM Prem Starkey,  [facility]        Today's Diagnoses     Cervical radiculopathy    -  1      Care Instructions    Thanks for coming today.  Orth[facility] Medicine Clinic  [address] [place] [address]    To schedule future appointments in Ortho Clinic, you may call [phone].    To schedule ordered imaging by your provider:   Call Central Imaging [phone]    To schedule an injection ordered by your provider:  Call Central Imaging Injection scheduling line: [phone]  IZI-collectehart available online at:  [email or web address]    Please call if any further questions or concerns ([phone]).  Clinic hours 8 am to 5 pm.    Return to clinic (call) if symptoms worsen or fail to improve.          Follow-ups after your visit        Your next 10 appointments already scheduled     Oct 27, 2017 10:30 AM CDT   Neuro Treatment with Clemencia Montiel PT   [facility] ([facility])    [address] [place] [id number] [phone]            Oct 31, 2017  1:00 PM CDT   Neuro Treatment with Clemencia Montiel PT   [facility] ([facility])    [address] [address] [id number] [phone]            2017  6:30 PM CDT   MR CERVICAL SPINE W/O CONTRAST with MGMR1   [facility] ([facility])    [address] [place] [address] [phone]           Take your medicines as usual, unless your doctor tells you not to. Bring a list of your current medicines to your exam (including vitamins, minerals and over-the-counter drugs). Also bring the results of similar scans you may have had.  Please remove  any body piercings and hair extensions before you arrive.  Follow your doctor s orders. If you do not, we may have to postpone your exam.  You will not have contrast for this exam. You do not need to do anything special to prepare.  The MRI machine uses a strong magnet. Please wear clothes without metal (snaps, zippers). A sweatsuit works well, or we may give you a hospital gown.   **IMPORTANT** THE INSTRUCTIONS BELOW ARE ONLY FOR THOSE PATIENTS WHO HAVE BEEN TOLD THEY WILL RECEIVE SEDATION OR GENERAL ANESTHESIA DURING THEIR MRI PROCEDURE:  IF YOU WILL RECEIVE SEDATION (take medicine to help you relax during your exam):   You must get the medicine from your doctor before you arrive. Bring the medicine to the exam. Do not take it at home.   Arrive one hour early. Bring someone who can take you home after the test. Your medicine will make you sleepy. After the exam, you may not drive, take a bus or take a taxi by yourself.   No eating 8 hours before your exam. You may have clear liquids up until 4 hours before your exam. (Clear liquids include water, clear tea, black coffee and fruit juice without pulp.)  IF YOU WILL RECEIVE ANESTHESIA (be asleep for your exam):   Arrive 1 1/2 hours early. Bring someone who can take you home after the test. You may not drive, take a bus or take a taxi by yourself.   No eating 8 hours before your exam. You may have clear liquids up until 4 hours before your exam. (Clear liquids include water, clear tea, black coffee and fruit juice without pulp.)   You will spend four to five hours in the recovery room.  Please call the Imaging Department at your exam site with any questions.            Nov 02, 2017 10:15 AM CDT   Neuro Treatment with Clemencia Montiel PT   [facility] ([facility])    [address] [facility] [phone]            Nov 02, 2017 11:40 AM CDT   Return Visit with Prem Starkey DO   [facility] ([facility])    [address] [place] [address]   [phone]            2018 10:00 AM CST   (Arrive by 9:45 AM)   Return Multiple Sclerosis with Barrera Shannon MD   [facility] Multiple Sclerosis ([facility])    [address] [facility]  [address] [facility] [address] [phone]              Future tests that were ordered for you today     Open Future Orders        Priority Expected Expires Ordered    MR Cervical Spine w/o Contrast Routine  10/27/2018 10/27/2017            Who to contact     If you have questions or need follow up information about today's clinic visit or your schedule please contact [facility] directly at [phone].  Normal or non-critical lab and imaging results will be communicated to you by MyChart, letter or phone within 4 business days after the clinic has received the results. If you do not hear from us within 7 days, please contact the clinic through MyChart or phone. If you have a critical or abnormal lab result, we will notify you by phone as soon as possible.  Submit refill requests through [employer] or call your pharmacy and they will forward the refill request to us. Please allow 3 business days for your refill to be completed.          Additional Information About Your Visit        [employer] Information     [employer] is an electronic gateway that provides easy, online access to your medical records. With [employer], you can request a clinic appointment, read your test results, renew a prescription or communicate with your care team.     To sign up for [employer] visit the website at [email or web address]   You will be asked to enter the access code listed below, as well as some personal information. Please follow the directions to create your username and password.     Your access code is: [email or web address]  Expires: 2018  5:14 PM     Your access code will  in 90 days. If you need  "help or a new code, please contact your [facility] or call [phone] for assistance.        Care EveryWhere ID     This is your Care EveryWhere ID. This could be used by other organizations to access your [place] medical records  [id number]        Your Vitals Were     Pulse Height BMI (Body Mass Index)             68 1.651 m (5' 5\") 23.63 kg/m2          Blood Pressure from Last 3 Encounters:   10/27/17 115/71   08/29/17 103/64   08/15/17 113/72    Weight from Last 3 Encounters:   10/27/17 64.4 kg (142 lb)   08/29/17 64.5 kg (142 lb 1.6 oz)   08/15/17 74.5 kg (164 lb 3.2 oz)               Primary Care Provider Office Phone # Fax #    Amrita Tracey [phone]       [facility] [address] [facility] [address]        Equal Access to Services     [facility] Center[facility] : Hadii aad ku hadasho Soomaali, waaxda luqadaha, qaybta kaalmada adeegyada, waxay idiin hayaan kareneg kharaazul bangura . So [facility] [place] [phone].    ATENCIÓN: Si habla español, tiene a vazquez disposición servicios gratuitos de asistencia lingüística. [facility] [phone].    We comply with applicable federal civil rights laws and [place] laws. We do not discriminate on the basis of race, color, national origin, age, disability, sex, sexual orientation, or gender identity.            Thank you!     Thank you for choosing [facility]  for your care. Our goal is always to provide you with excellent care. Hearing back from our patients is one way we can continue to improve our services. Please take a few minutes to complete the written survey that you may receive in the mail after your visit with us. Thank you!             Your Updated Medication List - Protect others around you: Learn how to safely use, store and throw away your medicines at www.disposemymeds.org.          This list is accurate as of: 10/27/17 10:10 AM.  Always use your most recent med list.                   Brand " Name Dispense Instructions for use Diagnosis    AMPYRA 10 MG Tb12   Generic drug:  Dalfampridine           cholecalciferol 1000 UNIT tablet    vitamin D3     Take 1 tablet by mouth daily.    Multiple sclerosis (H)       Glatiramer Acetate 40 MG/ML Sosy      Inject 40 mg Subcutaneous    Multiple sclerosis (H)       MAGNESIUM PO       Multiple sclerosis (H)       melatonin 3 MG tablet      Take 3 mg by mouth    Multiple sclerosis (H)       MULTIVITAMIN ADULT PO       Multiple sclerosis (H)       VITAMIN B COMPLEX PO       Multiple sclerosis (H)

## 2017-10-27 NOTE — PROGRESS NOTES
"HISTORY OF PRESENT ILLNESS  Ms. Hopper is a pleasant 57 year old year old female who presents to clinic today with left arm isues. Colletta feels a numbness and tingling in the left arm.  Starts at the neck, radiates down her arm.  Some weakness at times, some numbness and tingling, mostly pain.  She has a history of primary progressive MS, diagnosed 4 years ago.  This feels unrelated to her.  Quality: shooting pain    Severity: moderate to severe  Timing: occurs intermittently  Modifying factors: resting and non-use makes it better, movement and use makes it worse  Associated signs & symptoms: none  Additional history: as documented    MEDICAL HISTORY  Patient Active Problem List   Diagnosis     Multiple sclerosis (H)       Current Outpatient Prescriptions   Medication Sig Dispense Refill     AMPYRA 10 MG TB12        Glatiramer Acetate 40 MG/ML SOSY Inject 40 mg Subcutaneous       melatonin 3 MG tablet Take 3 mg by mouth       cholecalciferol (VITAMIN D) 1000 UNIT tablet Take 1 tablet by mouth daily.       Multiple Vitamins-Minerals (MULTIVITAMIN ADULT PO)        B Complex Vitamins (VITAMIN B COMPLEX PO)        MAGNESIUM PO          Allergies   Allergen Reactions     Compazine [Prochlorperazine] Swelling     Swelling of tongue about 25 years ago       No family history on file.    Additional medical/Social/Surgical histories reviewed in [employer] and updated as appropriate.     REVIEW OF SYSTEMS (10/27/2017)  10 point ROS of systems including Constitutional, Eyes, Respiratory, Cardiovascular, Gastroenterology, Genitourinary, Integumentary, Musculoskeletal, Psychiatric were all negative except for pertinent positives noted in my HPI.     PHYSICAL EXAM  Vitals:    10/27/17 0921   BP: 115/71   Pulse: 68   Weight: 64.4 kg (142 lb)   Height: 1.651 m (5' 5\")     General  - normal appearance, in no obvious distress  CV  - normal peripheral perfusion  Pulm  - normal respiratory pattern, non-labored  Musculoskeletal - cervical " spine  - inspection: normal bone and joint alignment, no obvious kyphosis  - palpation: no paravertebral or bony tenderness  - ROM: pain with rotation and sidebending  - strength: upper extremities 5/5 in all planes  - special tests:  (-) Maci's reflex  Neuro  - C5-7 DTRs 2+ bilaterally, no sensory or motor deficit, grossly normal coordination, normal muscle tone  Skin  - no ecchymosis, erythema, warmth, or induration, no obvious rash  Psych  - interactive, appropriate, normal mood and affect          ASSESSMENT & PLAN  Ms. Hopper is a 57 year old year old female who is in the office today with neck and left arm pain.    I ordered & reviewed an x-ray of her cervical spine which shows degenerative changes at    It does appear that her symptoms are more likely radicular in nature rather than MS related.  Colletta and I had a good discussion regarding non-operative treatment for degenerative disc disease.  This included strengthening of the paraspinal and core muscles, weight control, and oral analgesics when needed.  We also discussed the role of epidural corticosteroid injections.    I'm ordering an MR of her cervical spine.    It was a pleasure taking care of Colletta.        Prem Starkey DO, CAM

## 2017-10-27 NOTE — PATIENT INSTRUCTIONS
Thanks for coming today.  [facility] Medicine Clinic  [address] [place] [address]    To schedule future appointments in Ortho Clinic, you may call [phone].    To schedule ordered imaging by your provider:   Call Central Imaging [phone]    To schedule an injection ordered by your provider:  Call Central Imaging Injection scheduling line: [phone]  Maze[employer]olt Technologieshart available online at:  [email or web address]    Please call if any further questions or concerns ([phone]).  Clinic hours 8 am to 5 pm.    Return to clinic (call) if symptoms worsen or fail to improve.

## 2017-10-27 NOTE — NURSING NOTE
"Colletta Dwen Sorrell's goals for this visit include: Left sided arm tingling/numbness  She requests these members of her care team be copied on today's visit information: no    PCP: Amrita Tracey    Referring Provider:  No referring provider defined for this encounter.    Chief Complaint   Patient presents with     Musculoskeletal Problem     Left arm tingling and some numbness. Starts from the left side of the neck and radiates down the arm.        Initial /71  Pulse 68  Ht 1.651 m (5' 5\")  Wt 64.4 kg (142 lb)  BMI 23.63 kg/m2 Estimated body mass index is 23.63 kg/(m^2) as calculated from the following:    Height as of this encounter: 1.651 m (5' 5\").    Weight as of this encounter: 64.4 kg (142 lb).  Medication Reconciliation: complete  "

## 2017-11-01 ENCOUNTER — RADIANT APPOINTMENT (OUTPATIENT)
Dept: MRI IMAGING | Facility: CLINIC | Age: 57
End: 2017-11-01
Attending: FAMILY MEDICINE
Payer: COMMERCIAL

## 2017-11-01 DIAGNOSIS — M54.12 CERVICAL RADICULOPATHY: ICD-10-CM

## 2017-11-01 PROCEDURE — 72141 MRI NECK SPINE W/O DYE: CPT | Performed by: RADIOLOGY

## 2017-11-02 ENCOUNTER — HOSPITAL ENCOUNTER (OUTPATIENT)
Dept: PHYSICAL THERAPY | Facility: CLINIC | Age: 57
Setting detail: THERAPIES SERIES
End: 2017-11-02
Attending: FAMILY MEDICINE
Payer: COMMERCIAL

## 2017-11-02 ENCOUNTER — OFFICE VISIT (OUTPATIENT)
Dept: ORTHOPEDICS | Facility: CLINIC | Age: 57
End: 2017-11-02
Payer: COMMERCIAL

## 2017-11-02 VITALS
WEIGHT: 142 LBS | HEIGHT: 65 IN | HEART RATE: 61 BPM | DIASTOLIC BLOOD PRESSURE: 64 MMHG | BODY MASS INDEX: 23.66 KG/M2 | SYSTOLIC BLOOD PRESSURE: 117 MMHG

## 2017-11-02 DIAGNOSIS — M54.12 CERVICAL RADICULOPATHY: Primary | ICD-10-CM

## 2017-11-02 PROCEDURE — 97110 THERAPEUTIC EXERCISES: CPT | Mod: GP | Performed by: PHYSICAL THERAPIST

## 2017-11-02 PROCEDURE — 99213 OFFICE O/P EST LOW 20 MIN: CPT | Performed by: FAMILY MEDICINE

## 2017-11-02 PROCEDURE — 97116 GAIT TRAINING THERAPY: CPT | Mod: GP | Performed by: PHYSICAL THERAPIST

## 2017-11-02 PROCEDURE — 40000719 ZZHC STATISTIC PT DEPARTMENT NEURO VISIT: Performed by: PHYSICAL THERAPIST

## 2017-11-02 ASSESSMENT — PAIN SCALES - GENERAL: PAINLEVEL: NO PAIN (0)

## 2017-11-02 NOTE — PROGRESS NOTES
"HISTORY OF PRESENT ILLNESS  Ms. Hopper is a pleasant 57 year old year old female following up with cervical radiculopathy.  Colletta is here to review her MRI.  Additional history: as documented      REVIEW OF SYSTEMS (11/2/2017)  10 point ROS of systems including Constitutional, Eyes, Respiratory, Cardiovascular, Gastroenterology, Genitourinary, Integumentary, Musculoskeletal, Psychiatric were all negative except for pertinent positives noted in my HPI.     PHYSICAL EXAM  Vitals:    11/02/17 1125   BP: 117/64   Pulse: 61   Weight: 64.4 kg (142 lb)   Height: 1.651 m (5' 5\")       ASSESSMENT & PLAN  Ms. Hopper is a 57 year old year old female following up with cervical radiculopathy.    I reviewed her MR in the room with her which reads:  Impression:    1. Stable cord. Grossly stable T2 hyperintense foci in the spinal cord  compared to the prior exam with a stable T2 hyperintense focus in the  right hemicerebellar white matter. Findings are consistent with a  demyelinating disease. This study was not specifically tailored to  evaluate the demyelinating disease.  2. Degenerative changes cervical spine, mild and unchanged.    Colletta and I had a good discussion regarding non-operative treatment for degenerative disc disease.  This included strengthening of the paraspinal and core muscles, weight control, and oral analgesics when needed.  We also discussed the role of epidural corticosteroid injections.    I'm referring her for a cervical injection.  After this she'll continue PT.    Alicia's going to let me now how she's doing a couple of weeks after her injection.    It was a pleasure seeing Colletta.    20 minutes was spent in the room, 15 of which was spent on counseling and coordination of care.        Prem Starkey, DO, CAQSM        "

## 2017-11-02 NOTE — NURSING NOTE
"Colletta Dwen Sorrell's goals for this visit include: Follow up after cervical MRI  She requests these members of her care team be copied on today's visit information: no    PCP: Amrita Tracey    Referring Provider:  Referred Self, MD  No address on file    Chief Complaint   Patient presents with     RECHECK     Follow up after cervical MRI.       Initial /64  Pulse 61  Ht 1.651 m (5' 5\")  Wt 64.4 kg (142 lb)  BMI 23.63 kg/m2 Estimated body mass index is 23.63 kg/(m^2) as calculated from the following:    Height as of this encounter: 1.651 m (5' 5\").    Weight as of this encounter: 64.4 kg (142 lb).  Medication Reconciliation: complete  "

## 2017-11-02 NOTE — MR AVS SNAPSHOT
After Visit Summary   11/2/2017    Colletta Dwen Sorrell    MRN: [id number]           Patient Information     Date Of Birth          1960        Visit Information        Provider Department      11/2/2017 11:40 AM Prem Starkey DO [facility]        Today's Diagnoses     Cervical radiculopathy    -  1       Follow-ups after your visit        Your next 10 appointments already scheduled     Nov 16, 2017  2:30 PM CST   Neuro Treatment with Clemencia Montiel, PT   [facility] ([facility])    [address] [facility] [address]   [phone]            Nov 20, 2017  1:00 PM CST   Neuro Treatment with Clemencia Callnaila, PT   [facility] ([facility])    [address] [facility] [address]   [phone]            Jan 09, 2018 10:00 AM CST   (Arrive by 9:45 AM)   Return Multiple Sclerosis with Barrera Shannon MD   [facility] Multiple Sclerosis ([facility])    [address] [place] [id number] [phone]              Future tests that were ordered for you today     Open Future Orders        Priority Expected Expires Ordered    XR Cervical/Thoracic Epidural Inj Incl Imaging Routine 11/2/2017 11/2/2018 11/2/2017            Who to contact     If you have questions or need follow up information about today's clinic visit or your schedule please contact [facility] directly at [phone].  Normal or non-critical lab and imaging results will be communicated to you by MyChart, letter or phone within 4 business days after the clinic has received the results. If you do not hear from us within 7 days, please contact the clinic through [employer] or phone. If you have a critical or abnormal lab result, we will notify you by phone as soon as possible.  Submit refill requests through [employer] or call your pharmacy and they will  "forward the refill request to us. Please allow 3 business days for your refill to be completed.          Additional Information About Your Visit        Out[employer]art Information     [employer] is an electronic gateway that provides easy, online access to your medical records. With [employer], you can request a clinic appointment, read your test results, renew a prescription or communicate with your care team.     To sign up for [employer] visit the website at [email or web address]   You will be asked to enter the access code listed below, as well as some personal information. Please follow the directions to create your username and password.     Your access code is: [email or web address]  Expires: 2018  5:14 PM     Your access code will  in 90 days. If you need help or a new code, please contact your [facility] or call [phone] for assistance.        Care EveryWhere ID     This is your Care EveryWhere ID. This could be used by other organizations to access your [place] medical records  [id number]        Your Vitals Were     Pulse Height BMI (Body Mass Index)             61 1.651 m (5' 5\") 23.63 kg/m2          Blood Pressure from Last 3 Encounters:   17 117/64   10/27/17 115/71   17 103/64    Weight from Last 3 Encounters:   17 64.4 kg (142 lb)   10/27/17 64.4 kg (142 lb)   17 64.5 kg (142 lb 1.6 oz)               Primary Care Provider Office Phone # Fax #    Amrita BRITTNEY SURENDRA Rossphilip [phone]       [facility] [address]  [facility] [address]        Equal Access to Services     [facility] : Hadii shon giron hadasho Soomaali, waaxda luqadaha, qaybta kaalmada adeegyada, eboni bangura . So [facility] [phone].    ATENCIÓN: Si habla español, tiene a vazquez disposición servicios gratuitos de asistencia lingüística. Llame al [phone].    We comply with applicable federal civil rights laws and [place] laws. We do not " discriminate on the basis of race, color, national origin, age, disability, sex, sexual orientation, or gender identity.            Thank you!     Thank you for choosing [facility]  for your care. Our goal is always to provide you with excellent care. Hearing back from our patients is one way we can continue to improve our services. Please take a few minutes to complete the written survey that you may receive in the mail after your visit with us. Thank you!             Your Updated Medication List - Protect others around you: Learn how to safely use, store and throw away your medicines at www.disposemymeds.org.          This list is accurate as of: 11/2/17 11:51 AM.  Always use your most recent med list.                   Brand Name Dispense Instructions for use Diagnosis    AMPYRA 10 MG Tb12   Generic drug:  Dalfampridine           cholecalciferol 1000 UNIT tablet    vitamin D3     Take 1 tablet by mouth daily.    Multiple sclerosis (H)       Glatiramer Acetate 40 MG/ML Sosy      Inject 40 mg Subcutaneous    Multiple sclerosis (H)       MAGNESIUM PO       Multiple sclerosis (H)       melatonin 3 MG tablet      Take 3 mg by mouth    Multiple sclerosis (H)       MULTIVITAMIN ADULT PO       Multiple sclerosis (H)       VITAMIN B COMPLEX PO       Multiple sclerosis (H)

## 2017-11-20 ENCOUNTER — HOSPITAL ENCOUNTER (OUTPATIENT)
Dept: PHYSICAL THERAPY | Facility: CLINIC | Age: 57
Setting detail: THERAPIES SERIES
End: 2017-11-20
Attending: PSYCHIATRY & NEUROLOGY
Payer: COMMERCIAL

## 2017-11-20 PROCEDURE — 40000719 ZZHC STATISTIC PT DEPARTMENT NEURO VISIT: Performed by: PHYSICAL THERAPIST

## 2017-11-20 PROCEDURE — 97116 GAIT TRAINING THERAPY: CPT | Mod: GP | Performed by: PHYSICAL THERAPIST

## 2017-11-20 PROCEDURE — 97110 THERAPEUTIC EXERCISES: CPT | Mod: GP | Performed by: PHYSICAL THERAPIST

## 2017-11-20 NOTE — IP AVS SNAPSHOT
MRN:[id number]                      After Visit Summary   11/20/2017    Colletta Dwen Sorrell    MRN: [id number]           Visit Information        Provider Department      11/20/2017  1:00 PM Clemencia Montiel, PT [facility]        Your next 10 appointments already scheduled     Nov 21, 2017 10:15 AM CST   XR CERVICAL/THORACIC EPIDURAL INJECTION with MGXR3, MG NEURO RAD   [facility] ([facility])    [address] [place] [address] [phone]           For nerve root injection, please send or bring copies of any MRIs or other scans you have had.  Bring a list of your current medicines to your exam. (Include vitamins, minerals and over-the-counter medicines.) Leave your valuables at home.  Plan to have someone drive you home afterward.  Stop taking the following medicines (but talk to your doctor first):   If you take blood thinners, you may need to stop taking them a few days before treatment. Talk to your doctor before stopping these medicines.Stop taking Coumadin (warfarin) 3 days before treatment. Restart the day after treatment.   If you take Plavix, Ticlid, Pletal or Persantine, please ask your doctor if you should stop these medicines. You may need extra tests on the morning of your scan.   If you take Xarelto (Rivaroxaban), you may need to stop taking it 24 hours before treatment. Talk to your doctor before stopping this medicine. Restart the day after treatment.  You may take your other medicines as normal.  Stop all food and drink (including water) 3 hours before your test or treatment.  Please tell the doctor:   If you are allergic to X-ray dye (contrast fluid).   If you may be pregnant.  Injections take about 30 to 45 minutes. Most people spend up to 2 hours in the clinic or hospital.  Please call the Imaging Department at your exam site with any questions.            Jan 09, 2018 10:00 AM CST   (Arrive by 9:45 AM)  "  Return Multiple Sclerosis with Barrera Shannon MD   [facility] Multiple Sclerosis ([facility])    [address] [address] [phone]                Further instructions from your care team       Things to continue working on for PT:     1. Neck exercises   - chin tucks  -head leans to the R side   - combine these  (tuck and lean)   - Think about your posture!! Towel roll or pillow behind the back for better alignment.     2. Walking   -Wear brace for a couple hours a day  - 1-2 minutes bursts as energy allows     3. Continue stretches and exercises for lower body that you have handouts of.     Please feel free to schedule more appointments with me when you feel ready!   It has been great working with you:)    Matt, PT, DPT    [employer] Information     [employer] lets you send messages to your doctor, view your test results, renew your prescriptions, schedule appointments and more. To sign up, go to [email or web address] . Click on \"Log in\" on the left side of the screen, which will take you to the Welcome page. Then click on \"Sign up Now\" on the right side of the page.     You will be asked to enter the access code listed below, as well as some personal information. Please follow the directions to create your username and password.     Your access code is: [email or web address]  Expires: 2018  4:14 PM     Your access code will  in 90 days. If you need help or a new code, please call your [facility] clinic or [phone].        Care EveryWhere ID     This is your Care EveryWhere ID. This could be used by other organizations to access your [facility] medical records  [id number]        Equal Access to Services     LEIDY SHUKLA : Hadii shon Bell, jeffrey zhao, danny abelalmaeboni perez. So [facility] [phone].    ATENCIÓN: Si habla español, tiene a vazquez disposición servicios gratuitos de asistencia " lingüística. Matthew al [phone].    We comply with applicable federal civil rights laws and [place] laws. We do not discriminate on the basis of race, color, national origin, age, disability, sex, sexual orientation, or gender identity.

## 2017-11-20 NOTE — DISCHARGE INSTRUCTIONS
Things to continue working on for PT:     1. Neck exercises   - chin tucks  -head leans to the R side   - combine these  (tuck and lean)   - Think about your posture!! Towel roll or pillow behind the back for better alignment.     2. Walking   -Wear brace for a couple hours a day  - 1-2 minutes bursts as energy allows     3. Continue stretches and exercises for lower body that you have handouts of.     Please feel free to schedule more appointments with me when you feel ready!   It has been great working with you:)    Matt, PT, DPT

## 2017-11-20 NOTE — PROGRESS NOTES
Outpatient Physical Therapy Progress Note     Patient: Colletta Dwen Sorrell  : 1960    Beginning/End Dates of Reporting Period:  17 to 2017    Referring Provider: Barrera Shannon MD    Therapy Diagnosis: decreased activity tolerance, weakness, balance impairments     Client Self Report: Patient is having injection tomorrow in cervical spine for her radiculopathy. She has been also working on the chin tuck and [employer] stretch and said that this relives her syptoms when they occur. She has been having trouble with sleep and fatigue as well as paying for rides to therapy each day so wanting to take a break until after the holidays and the starting up again.     Objective Measurements:  Objective Measure: TUG  Details: 23.5 secs; 2WW and AFO   Objective Measure: 25 foot walk test   Details: 17 steps, 18.9 seconds      Goals:  Goal Identifier gait speed- MET   Goal Description Patient will decrease time on 25 foot walk test by 10 secs and or <18 steps with use of devices as needed in order to improve her speed and safety with gait for community mobility.  (12.29 secs, 17 steps )   Target Date 17   Date Met  17   Progress:     Goal Identifier energy conservation- progressing but still needing education regarding this    Goal Description Patient will state 5 strategies that she can use to conserve energy in order to decrease her overall fatigue and acoomplish tasks that need to be completed each day.    Target Date 17 (extend 17)   Date Met      Progress:     Goal Identifier TUG- MET   Goal Description Patient will improve score on TUG to < 25 seconds in order to show improve ease of mobility in home when getting up to use the restroom and decrease her falls risk.    Target Date 17   Date Met  17   Progress:     Goal Identifier sit<>stand- progressing, can perform 8 reps    Goal Description Patient will perform 10 sit<>stands in 30 secs without use of UEs from 19''  surface in order to show improvement in strength in LEs and ability to get from lower surface without UE use    Target Date 11/22/17 (extend to 2/19/17)   Date Met      Progress:     Goal Identifier HEP- progressing but is still working toward consistent performance    Goal Description Patient will demonstrate independence with HEP for strength, balance and gait in order to improve her mobility outside of PT sessions.    Target Date 11/22/17 (extend 2/19/17)   Date Met      Progress:     Goal Identifier NEW GOAL: stairs    Goal Description Patient will perform 10 steps with B hand rails and reciprocal pattern in order to show improvement in her LE strength and decrease her fatigue on the stairs.    Target Date 02/19/17   Date Met      Progress:     Goal Identifier NEW GOAL: aerobic endurance   Goal Description Patient will tolerate 10 minutes of continuous aerobic activity on Nu-step without rest breaks and stable vitals in order to improve his fatigue and activity tolerance.    Target Date  2/19/17   Date Met      Progress:     Progress Toward Goals:   Making progress toward all goals.       Plan:  Patient wanting to take break from therapy- until the under of the year d/t transportation issues and wanting to try some more things at home. She will plan to resume therapy in January. Frequency will be determined then and new progress note will be written to support.     Discharge:  No

## 2017-11-21 ENCOUNTER — RADIANT APPOINTMENT (OUTPATIENT)
Dept: GENERAL RADIOLOGY | Facility: CLINIC | Age: 57
End: 2017-11-21
Attending: FAMILY MEDICINE
Payer: COMMERCIAL

## 2017-11-21 VITALS — DIASTOLIC BLOOD PRESSURE: 65 MMHG | HEART RATE: 62 BPM | SYSTOLIC BLOOD PRESSURE: 117 MMHG | OXYGEN SATURATION: 99 %

## 2017-11-21 DIAGNOSIS — M54.12 CERVICAL RADICULOPATHY: ICD-10-CM

## 2017-11-21 PROCEDURE — 62321 NJX INTERLAMINAR CRV/THRC: CPT | Performed by: RADIOLOGY

## 2017-11-21 RX ORDER — IOPAMIDOL 408 MG/ML
10 INJECTION, SOLUTION INTRATHECAL ONCE
Status: COMPLETED | OUTPATIENT
Start: 2017-11-21 | End: 2017-11-21

## 2017-11-21 RX ORDER — BETAMETHASONE SODIUM PHOSPHATE AND BETAMETHASONE ACETATE 3; 3 MG/ML; MG/ML
6 INJECTION, SUSPENSION INTRA-ARTICULAR; INTRALESIONAL; INTRAMUSCULAR; SOFT TISSUE ONCE
Status: COMPLETED | OUTPATIENT
Start: 2017-11-21 | End: 2017-11-21

## 2017-11-21 RX ADMIN — IOPAMIDOL 2 ML: 408 INJECTION, SOLUTION INTRATHECAL at 09:59

## 2017-11-21 RX ADMIN — BETAMETHASONE SODIUM PHOSPHATE AND BETAMETHASONE ACETATE 18 MG: 3; 3 INJECTION, SUSPENSION INTRA-ARTICULAR; INTRALESIONAL; INTRAMUSCULAR; SOFT TISSUE at 09:59

## 2017-11-21 NOTE — DISCHARGE SUMMARY
AFTER YOU GO HOME    ? DO relax; minimize your activity for 24 hours  ? You may resume normal activity tomorrow  ? You may remove the bandage in the evening or next morning  ? You may resume bathing the next day  ? Drink at least 4 extra glasses of fluid today if not on fluid restrictions  ? DO NOT drive or operate machinery at home or at work for at least 24 hours      VISIT THE EMERGENCY ROOM OR URGENT CARE IF:    ? There is redness or swelling at the injection site  ? There is discharge from the injection site  ? You develop a temperature of 101  F or greater      ADDITIONAL INSTRUCTIONS:     ? You may resume your Coumadin or other blood thinner at your regular dose today.  Follow up with your physician to have your INR rechecked if indicated.  ? If you gain no relief from the injection after two (2) weeks, follow-up with your provider for your options.        Contacts:    During business hours from 8 to 5 pm, you may call [phone] to reach a nurse advisor at [facility].  After hours, call [facility]  [phone].  Ask for the Radiologist on-call.  Someone is on-call 24 hrs/day.  [facility] Toll Free Number   [phone]

## 2017-11-21 NOTE — PROGRESS NOTES
: Colletta Sorrell was seen in X-ray today for a cervical epidural injection. Patient rated pain before procedure 0/10, she has no pain, but rather a undescribable sensation. After procedure patient rated pain 0/10. This pain level is acceptable to patient. Patient discharged home with [employer].

## 2017-11-24 ENCOUNTER — TELEPHONE (OUTPATIENT)
Dept: NEUROLOGY | Facility: CLINIC | Age: 57
End: 2017-11-24

## 2017-11-24 NOTE — TELEPHONE ENCOUNTER
----- Message from Melissa Mendenhall sent at 11/24/2017  8:21 AM CST -----  Regarding: Pt calling asking for orders for an x-ray of the chest   Contact: [phone]  Pt calling asking for orders for a chest x-ray. Per pt her chest has been hurting on and off and would like a chest x-ray. Pt is also asking for a call back about x-ray issues.   Pt can be reached at [phone]    Thank You,  Melissa    Please DO NOT send this message and/or reply back to sender.  Call Center Representatives DO NOT respond to messages.

## 2017-11-24 NOTE — TELEPHONE ENCOUNTER
"Colletta Dwen Sorrell is a 57 year old female who calls with chest pain.     PRESENTING PROBLEM:  Patient reports intermittent chest pain for the past 1.5 months.      NURSING ASSESSMENT:  Patient complains of chest pain.  Onset:  1.5 months  Pain is characterized as pressure   Severity moderate  Located \"middle of chest'   Radiates to none.  Duration intermittent.  Associated symptoms none.  Exacerbated by no known provoking events.  Relieved by none.  Cardiac risk factors: none.  Associated Medical History:  Multiple Sclerosis  Allergies:   Allergies   Allergen Reactions     Compazine [Prochlorperazine] Swelling     Swelling of tongue about 25 years ago       RECOMMENDED DISPOSITION:  See in 4 hours, another person to drive.  Patient will call the [facility] at [phone] to see if she can obtain an appointment with a primary care provider within recommended timeframe.  If she is unable to obtain appointment within next 4 hours, she will got to urgent care for evaluation.      Will comply with recommendation: Yes  If further questions/concerns or if symptoms do not improve, worsen or new symptoms develop, call your PCP or [facility] as soon as possible.      Guideline used:  Telephone Triage Protocols for Nurses, Fifth Edition, Marce Flores RN    "

## 2017-11-27 ENCOUNTER — CARE COORDINATION (OUTPATIENT)
Dept: CARDIOLOGY | Facility: CLINIC | Age: 57
End: 2017-11-27

## 2017-11-27 ENCOUNTER — TELEPHONE (OUTPATIENT)
Dept: ORTHOPEDICS | Facility: CLINIC | Age: 57
End: 2017-11-27

## 2017-11-27 ENCOUNTER — NURSE TRIAGE (OUTPATIENT)
Dept: NURSING | Facility: CLINIC | Age: 57
End: 2017-11-27

## 2017-11-27 NOTE — PROGRESS NOTES
Type of call: Other     Other:   Notes: Pt is requesting a Chest MRI and not a Chest Xray  She feels that this will show more.  She also needs a referral to make and appt with Bette Calderón to have the Chest MRI completed.  Would like to get in tomorrow for this as she is having chest pain.  They have been going off and on for 3 months.  Patient call back number: [phone]

## 2017-11-27 NOTE — TELEPHONE ENCOUNTER
Per triage, patient called in with the following request:    Pt is requesting a Chest MRI and not a Chest Xray  She feels that this will show more.  She also needs a referral to make and appt with Bette Calderón to have the Chest MRI completed.  Would like to get in tomorrow for this as she is having chest pain.  They have been going off and on for 3 months. Patient call back number: [phone]    Dr. Shannon, I'm assuming she will have to follow up with her PCP. Please advise.    Thank you,  Mahsa Mays, RN Care Coordinator     5

## 2017-11-27 NOTE — TELEPHONE ENCOUNTER
Patient calling stating she had spoken to a nurse on 11/24/17 regarding chest pain and was advised to go to [facility] for a chest x-ray.  Per notes patient stating intermittent chest pain x 1 1/2 months. Patient denies current symptoms. Stating she is trying to schedule appointment.  Reviewed Notes from 11/24/17 with patient, states See in 4 hours, another person to drive.  Patient will call the [facility] at [phone] to see if she can obtain an appointment with a primary care provider within recommended timeframe.  If she is unable to obtain appointment within next 4 hours, she will got to urgent care for evaluation.      Patient stating she was not seen and would not have transportation until 11/28/17 with [employer]       Renee Whitaker RN  [facility]

## 2017-11-27 NOTE — TELEPHONE ENCOUNTER
Called patient back and discussed low back injections. She did have lumbar ALEJO on 11-21-17 and she states she has had no relief up until now. We discussed how sometimes it can take fully 2 weeks for patients to see results. I advised she wait it out another 7 days or so. She will call back to update on her progress/symptoms at the 2 week bryanna.     All questions answered.

## 2017-11-27 NOTE — TELEPHONE ENCOUNTER
Spoke with patient and per Dr. Shannon's input, instructed her to follow up with her PCP or urgent care. She is agreeable to this and offered no further questions or concerns.     Mahsa Mays, RN Care Coordinator

## 2017-11-27 NOTE — TELEPHONE ENCOUNTER
[facility] Call Center    Phone Message    Name of Caller: Alicia    Phone Number: Home number on file [phone] (home)    Best time to return call: Any    May a detailed message be left on voicemail: yes    Relation to patient: Self    Reason for Call: Other: Patient would like a call back to discuss bulging discs in her back. Please advise.       Action Taken: Message routed to:  Adult Clinics: Sports Medicine p [phone]

## 2018-01-09 ENCOUNTER — OFFICE VISIT (OUTPATIENT)
Dept: NEUROLOGY | Facility: CLINIC | Age: 58
End: 2018-01-09
Attending: PSYCHIATRY & NEUROLOGY
Payer: COMMERCIAL

## 2018-01-09 VITALS
SYSTOLIC BLOOD PRESSURE: 123 MMHG | RESPIRATION RATE: 20 BRPM | HEIGHT: 65 IN | TEMPERATURE: 98.3 F | DIASTOLIC BLOOD PRESSURE: 71 MMHG | HEART RATE: 67 BPM | OXYGEN SATURATION: 99 % | BODY MASS INDEX: 24.61 KG/M2 | WEIGHT: 147.7 LBS

## 2018-01-09 DIAGNOSIS — G35 MS (MULTIPLE SCLEROSIS) (H): Primary | ICD-10-CM

## 2018-01-09 PROBLEM — M25.472 ANKLE SWELLING, LEFT: Status: ACTIVE | Noted: 2017-06-19

## 2018-01-09 PROCEDURE — G0463 HOSPITAL OUTPT CLINIC VISIT: HCPCS | Mod: ZF

## 2018-01-09 RX ORDER — OXYBUTYNIN CHLORIDE 10 MG/1
1 TABLET, EXTENDED RELEASE ORAL DAILY
COMMUNITY
Start: 2017-12-31 | End: 2018-09-25

## 2018-01-09 ASSESSMENT — PAIN SCALES - GENERAL: PAINLEVEL: NO PAIN (0)

## 2018-01-09 NOTE — MR AVS SNAPSHOT
"              After Visit Summary   1/9/2018    Colletta Dwen Sorrell    MRN: [id number]           Patient Information     Date Of Birth          1960        Visit Information        Provider Department      1/9/2018 10:00 AM Barrera Shannon MD [facility] Multiple Sclerosis         Follow-ups after your visit        Follow-up notes from your care team     Return in about 6 months (around 7/9/2018).      Your next 10 appointments already scheduled     Jul 03, 2018 10:00 AM CDT   (Arrive by 9:45 AM)   Return Multiple Sclerosis with Barrera Shannon MD   [facility] Multiple Sclerosis ([facility])    [address]  [address]  [facility] [address] [phone]              Who to contact     If you have questions or need follow up information about today's clinic visit or your schedule please contact [facility] MULTIPLE SCLEROSIS directly at [phone].  Normal or non-critical lab and imaging results will be communicated to you by MyChart, letter or phone within 4 business days after the clinic has received the results. If you do not hear from us within 7 days, please contact the clinic through MyChart or phone. If you have a critical or abnormal lab result, we will notify you by phone as soon as possible.  Submit refill requests through [employer] or call your pharmacy and they will forward the refill request to us. Please allow 3 business days for your refill to be completed.          Additional Information About Your Visit        MyChart Information     [employer] lets you send messages to your doctor, view your test results, renew your prescriptions, schedule appointments and more. To sign up, go to [email or web address] . Click on \"Log in\" on the left side of the screen, which will take you to the Welcome page. Then click on \"Sign up Now\" on the right side of the page.     You will be asked to enter the access code listed below, as well as some personal information. " "Please follow the directions to create your username and password.     Your access code is: [email or web address]  Expires: 2018 10:37 AM     Your access code will  in 90 days. If you need help or a new code, please call your [facility] clinic or [phone].        Care EveryWhere ID     This is your Care EveryWhere ID. This could be used by other organizations to access your [facility] medical records  [id number]        Your Vitals Were     Pulse Temperature Respirations Height Pulse Oximetry Breastfeeding?    67 98.3  F (36.8  C) (Oral) 20 1.651 m (5' 5\") 99% No    BMI (Body Mass Index)                   24.58 kg/m2            Blood Pressure from Last 3 Encounters:   18 123/71   17 117/65   17 117/64    Weight from Last 3 Encounters:   18 67 kg (147 lb 11.2 oz)   17 64.4 kg (142 lb)   10/27/17 64.4 kg (142 lb)              Today, you had the following     No orders found for display       Primary Care Provider Office Phone # Fax #    Amrita H SURENDRA Tracey [phone]       [facility] [address] [address]        Equal Access to Services     [facility]: Hadii aad ku hadasho Soomaali, waaxda luqadaha, qaybta kaalmada adeegyada, waxay idiin hayaan nir bangura . So [facility] [phone].    ATENCIÓN: Si habla español, tiene a vazquez disposición servicios gratuitos de asistencia lingüística. Llame al [phone].    We comply with applicable federal civil rights laws and [place] laws. We do not discriminate on the basis of race, color, national origin, age, disability, sex, sexual orientation, or gender identity.            Thank you!     Thank you for choosing [facility] MULTIPLE SCLEROSIS  for your care. Our goal is always to provide you with excellent care. Hearing back from our patients is one way we can continue to improve our services. Please take a few minutes to complete the written survey that you may receive in the mail after your " visit with us. Thank you!             Your Updated Medication List - Protect others around you: Learn how to safely use, store and throw away your medicines at www.disposemymeds.org.          This list is accurate as of: 1/9/18 10:37 AM.  Always use your most recent med list.                   Brand Name Dispense Instructions for use Diagnosis    AMPYRA 10 MG Tb12   Generic drug:  Dalfampridine      Take 1 tablet by mouth 2 times daily        cholecalciferol 1000 UNIT tablet    vitamin D3     Take 1 tablet by mouth daily.    Multiple sclerosis (H)       Glatiramer Acetate 40 MG/ML Sosy      Inject 40 mg Subcutaneous    Multiple sclerosis (H)       melatonin 3 MG tablet      Take 3 mg by mouth nightly as needed    Multiple sclerosis (H)       MULTIVITAMIN ADULT PO      Take 1 tablet by mouth every other day    Multiple sclerosis (H)       oxybutynin 10 MG 24 hr tablet    DITROPAN-XL     Take 1 tablet by mouth 3 times daily as needed        VITAMIN B COMPLEX PO      Take 1 tablet by mouth as needed    Multiple sclerosis (H)

## 2018-01-09 NOTE — NURSING NOTE
"Chief Complaint   Patient presents with     RECHECK     [facility]- MULTIPLE SCLEROSIS F/U       Initial /71  Pulse 67  Temp 98.3  F (36.8  C) (Oral)  Resp 20  Ht 1.651 m (5' 5\")  Wt 67 kg (147 lb 11.2 oz)  SpO2 99%  Breastfeeding? No  BMI 24.58 kg/m2 Estimated body mass index is 24.58 kg/(m^2) as calculated from the following:    Height as of this encounter: 1.651 m (5' 5\").    Weight as of this encounter: 67 kg (147 lb 11.2 oz).  Medication Reconciliation: complete     Marcial Santamaria, JUAN      "

## 2018-01-10 NOTE — PROGRESS NOTES
"REASON FOR VISIT:  Colletta Sorrell is a 57-year-old woman who I have seen once previously in 07/2017 for primary progressive multiple sclerosis.  She returns for routine followup.      HISTORY OF PRESENT ILLNESS:  At our visit in July we opted to start ocrelizumab for primary progressive MS.  She tells me she only had a single infusion, rather than the 2 infusions 2 weeks apart.  Looking back, however, I see that she indeed had 2 infusions at [facility], 1  on 08/15 and the second on 08/29.  That would make her next infusion due in late February.  At her initial visit, she had been interested in participating in the clinical trial of ACTH, but as we were starting both dalfampridine and ocrelizumab she preferred to hold off until those were \"under control.\"  She took the Ampyra only briefly but stopped it because she felt \"wired\" and was sleeping poorly.  Her residual symptoms include leg weakness, worse on the left, fatigue, mild clumsiness and weakness of the left arm, neurogenic bladder and cognitive deficits.  She tells me that recently her right foot has been numb and tingly.  She had called with some unpleasant dysesthesias in the left neck, shoulder and arm.  She ultimately got some injections there and it is feeling better.      PHYSICAL EXAMINATION:   VITAL SIGNS:  Blood pressure 123/71.  Pulse 67.  Weight 147 pounds.     NEUROLOGIC:  She is alert and oriented.  Affect is bright, somewhat spacy.  Language functions are normal.  Cranial nerves are unremarkable.  Strength is normal in both arms but hip flexion is weak bilaterally at 4/5 on the right and 2/5 on the left.  Knee flexion strength is normal on the right but weak at 4/5 on the left.  Reflexes are asymmetric, brisker on the left.  She has a spastic, ataxic, left hemiparetic gait.      IMPRESSION:  Primary progressive MS, stable after initial doses of ocrelizumab.  I spent 30 minutes with Colletta, greater than 50% of which was spent in counseling " and coordination of care.  We discussed the Ampyra and her side effects.  I have had some patients have sleep disturbance on this and I suggested she try it again at 1 dose in the morning.  I reminded her that 2/3 of patients experience no benefit, but if she can tolerate it it would be good to see if it would improve her walking.  We also discussed ongoing use of the ocrelizumab and the potential for her still to continue with the ACTH study if she wants.  She would have to be stable on or off Ampyra for 3 months and enrollment is likely to close this calendar year, so that may be tricky.  She was a bit ambivalent today and we will see what happens with that.      PLAN:   1.  Continue ocrelizumab.   2.  Try Ampyra daily.   3.  Return to clinic in 6 months.         FAHAD HUIZAR MD             D: 01/10/2018 16:40   T: 01/10/2018 17:04   MT: nh      Name:     SORRELL, COLLETTA   MRN:      [id number]        Account:      [id number]   :      1960           Service Date: 2018      Document: [id number]

## 2018-04-23 ENCOUNTER — TELEPHONE (OUTPATIENT)
Dept: NEUROLOGY | Facility: CLINIC | Age: 58
End: 2018-04-23

## 2018-04-23 DIAGNOSIS — G35 MULTIPLE SCLEROSIS (H): Primary | ICD-10-CM

## 2018-04-23 NOTE — TELEPHONE ENCOUNTER
[facility] Call Center    Phone Message    May a detailed message be left on voicemail: yes    Reason for Call: Medication Refill Request    Has the patient contacted the pharmacy for the refill? Yes   Name of medication being requested: oxybutynin (DITROPAN-XL) 10 MG 24 hr tablet  Provider who prescribed the medication: Pt was told by her pharmacy that she should request her refills through Dr. Shannon now.         Action Taken: Message routed to:  [facility] ([facility]

## 2018-04-23 NOTE — TELEPHONE ENCOUNTER
Dr. Carpenter, Collette is requesting a refill of her oxybutynin, which you have not previously been the prescriber of; Are you okay with taking this over and refilling it? Thank you.    Tata Roberts, MS RN Care Coordinator

## 2018-04-24 NOTE — TELEPHONE ENCOUNTER
I called Colletta's pharmacy to clarify the oxybutynin; The pharmacist clarified with me that she get's oxybutynin XR 10mg tablets with directions to take 1 tablet daily; She was also just refilled for a year of this medication, and does not need a prescription from Dr. Shannon; I will update her chart to reflect correct dosing of the oxybutynin.    Tata Roberts, MS RN Care Coordinator

## 2018-04-30 ENCOUNTER — TELEPHONE (OUTPATIENT)
Dept: NEUROLOGY | Facility: CLINIC | Age: 58
End: 2018-04-30

## 2018-04-30 DIAGNOSIS — G35 MULTIPLE SCLEROSIS (H): Primary | ICD-10-CM

## 2018-04-30 RX ORDER — NALTREXONE 100 %
4 POWDER (GRAM) MISCELLANEOUS AT BEDTIME
Qty: 1 BOTTLE | Refills: 3 | Status: SHIPPED | OUTPATIENT
Start: 2018-04-30 | End: 2019-05-30

## 2018-04-30 NOTE — TELEPHONE ENCOUNTER
I called Colletta and she would like to try the LDN now, as opposed to waiting; Rx was sent to her pharmacy per Dr. Shannon.    Tata Roberts, MS RN Care Coordinator

## 2018-04-30 NOTE — TELEPHONE ENCOUNTER
[facility] Call Center    Phone Message    May a detailed message be left on voicemail: yes    Reason for Call: Medication Question or concern regarding medication   Prescription Clarification  Name of Medication: Naltrexone  Prescribing Provider: Dr Shannon   Pharmacy: Pt didn't say   What on the order needs clarification? N/A - Pt would like to ask Dr Shannon if he would put her on a medication called Naltrexone? She did some self research and said that she thinks this medication helps with Auto Immune Diseases and will help her with her MS. Please return her call to discuss. Thanks!        Action Taken: Message routed to:  [facility] Neurology

## 2018-04-30 NOTE — TELEPHONE ENCOUNTER
Well, my preference is to discuss it first, but she's not on any medications it would interfere with, so if she doesn't want to wait until July to discuss it, can try naltrexone 4 mg po qhs.

## 2018-04-30 NOTE — TELEPHONE ENCOUNTER
[facility] Call Center    Phone Message    May a detailed message be left on voicemail: yes    Reason for Call: Other: Pt returning call to Tata. Her phone . Please try patient on her cell phone. Thanks!     Action Taken: Message routed to:  [facility]

## 2018-04-30 NOTE — TELEPHONE ENCOUNTER
Dr. Shannon, please see message below from call center; Is this something you are willing to try for Colletta? She has her next appointment with you in July; Would you prefer to wait to discuss? Thank you.    Tata Roberts, MS RN Care Coordinator

## 2018-05-02 ENCOUNTER — TELEPHONE (OUTPATIENT)
Dept: NEUROLOGY | Facility: CLINIC | Age: 58
End: 2018-05-02

## 2018-05-02 DIAGNOSIS — G35 MULTIPLE SCLEROSIS (H): Primary | ICD-10-CM

## 2018-05-02 NOTE — TELEPHONE ENCOUNTER
[facility] Call Center    Phone Message    May a detailed message be left on voicemail: yes    Reason for Call: Order(s): Other:   Reason for requested: Pt requesting an Order be called in for new Rubber Tips for her walking cane  Date needed: asap - Pt said hers are worn out and falling off  Provider name: Dr Shannon  NEEDS ORDER CALLED INTO: [employer], Ph # [phone]. Or PLEASE FAX TO: [phone]. Thanks! If you have any questions please all Pt at home.      Action Taken: Message routed to:  [facility] Neurology

## 2018-05-02 NOTE — TELEPHONE ENCOUNTER
[facility] Call Center    Phone Message    May a detailed message be left on voicemail: yes    Reason for Call: Other: Update to message below: Pt needs 4 Rubber Tips

## 2018-05-03 NOTE — TELEPHONE ENCOUNTER
Dr. Shannon, please see call center messages below from the patient; Are you okay with her request? Thank you.    Tata Roberts, MS RN Care Coordinator

## 2018-05-03 NOTE — TELEPHONE ENCOUNTER
Dr. Shiva bauer with patient's request; DME order placed for 4 rubber tips for cane; This has been faxed to [facility] (phone [phone] fax [phone]).    Tata Roberts MS RN Care Coordinator

## 2018-05-08 ENCOUNTER — MEDICAL CORRESPONDENCE (OUTPATIENT)
Dept: HEALTH INFORMATION MANAGEMENT | Facility: CLINIC | Age: 58
End: 2018-05-08

## 2018-05-22 ENCOUNTER — TELEPHONE (OUTPATIENT)
Dept: NEUROLOGY | Facility: CLINIC | Age: 58
End: 2018-05-22

## 2018-05-22 NOTE — TELEPHONE ENCOUNTER
Dr. Shannon, please see call center message; Is this request something you are willing to order? Thank you.    Tata Roberts, MS RN Care Coordinator

## 2018-05-22 NOTE — TELEPHONE ENCOUNTER
[facility] Call Center    Phone Message    May a detailed message be left on voicemail: yes    Reason for Call: Order(s): Other: Thyroid labs  Reason for requested: Per Tabatha at [facility], the pt is requesting to have her thyroid checked. Hasn't had it checked for awhile and feels like she needs labs done. Per Tabatha, pt did not report any symptoms.  Date needed: unknown  Provider name: Dr Shannon      Action Taken: Message routed to:  [facility] Neurology

## 2018-05-30 ENCOUNTER — PRE VISIT (OUTPATIENT)
Dept: CARDIOLOGY | Facility: CLINIC | Age: 58
End: 2018-05-30

## 2018-05-30 NOTE — TELEPHONE ENCOUNTER
PREVISIT INFORMATION                                                    Colletta Dwen Sorrell scheduled for future visit at [facility] specialty clinics.    Patient is scheduled to see Dr. Brown on 06/19  Reason for visit: Chest Pain  Referring provider Self Referred  Has patient seen previous specialist? No  Medical Records:  Available in chart.  Patient was previously seen at a [place] or [facility] facility.    REVIEW                                                      New patient packet mailed to patient: N/A  Medication reconciliation complete: Yes  Pt will also bring an updated list to appt.      Current Outpatient Prescriptions   Medication Sig Dispense Refill     AMPYRA 10 MG TB12 Take 1 tablet by mouth 2 times daily        B Complex Vitamins (VITAMIN B COMPLEX PO) Take 1 tablet by mouth as needed        cholecalciferol (VITAMIN D) 1000 UNIT tablet Take 1 tablet by mouth daily.       Glatiramer Acetate 40 MG/ML SOSY Inject 40 mg Subcutaneous       melatonin 3 MG tablet Take 3 mg by mouth nightly as needed        Multiple Vitamins-Minerals (MULTIVITAMIN ADULT PO) Take 1 tablet by mouth every other day        Naltrexone POWD Take 4 mg by mouth At Bedtime 1 Bottle 3     oxybutynin (DITROPAN-XL) 10 MG 24 hr tablet Take 1 tablet by mouth daily         Allergies: Compazine [prochlorperazine]      PLAN/FOLLOW-UP NEEDED                                                      Previsit review complete.  Patient will see provider at future scheduled appointment.     Patient Reminders Given:  Please, make sure you bring an updated list of your medications.   If you are having a procedure, please, present 15 minutes early.  If you need to cancel or reschedule,please call [phone].    Ludivina Bonilla CMA

## 2018-06-19 ENCOUNTER — OFFICE VISIT (OUTPATIENT)
Dept: CARDIOLOGY | Facility: CLINIC | Age: 58
End: 2018-06-19
Payer: COMMERCIAL

## 2018-06-19 VITALS
DIASTOLIC BLOOD PRESSURE: 75 MMHG | WEIGHT: 141.7 LBS | SYSTOLIC BLOOD PRESSURE: 113 MMHG | BODY MASS INDEX: 23.58 KG/M2 | OXYGEN SATURATION: 100 % | HEART RATE: 68 BPM

## 2018-06-19 DIAGNOSIS — G62.9 NEUROPATHY: ICD-10-CM

## 2018-06-19 DIAGNOSIS — R07.9 CHEST PAIN, UNSPECIFIED TYPE: Primary | ICD-10-CM

## 2018-06-19 DIAGNOSIS — G35 MULTIPLE SCLEROSIS (H): ICD-10-CM

## 2018-06-19 LAB — TSH SERPL DL<=0.005 MIU/L-ACNC: 0.53 MU/L (ref 0.4–4)

## 2018-06-19 PROCEDURE — 93000 ELECTROCARDIOGRAM COMPLETE: CPT | Performed by: INTERNAL MEDICINE

## 2018-06-19 PROCEDURE — 84443 ASSAY THYROID STIM HORMONE: CPT | Performed by: INTERNAL MEDICINE

## 2018-06-19 PROCEDURE — 99214 OFFICE O/P EST MOD 30 MIN: CPT | Performed by: INTERNAL MEDICINE

## 2018-06-19 PROCEDURE — 36415 COLL VENOUS BLD VENIPUNCTURE: CPT | Performed by: INTERNAL MEDICINE

## 2018-06-19 RX ORDER — AMOXICILLIN 500 MG
1200 CAPSULE ORAL DAILY
COMMUNITY
Start: 2018-06-19

## 2018-06-19 ASSESSMENT — PAIN SCALES - GENERAL: PAINLEVEL: NO PAIN (0)

## 2018-06-19 NOTE — LETTER
June 21, 2018       TO: Colletta Dwen Sorrell  [address] [address]       Dear Ms. Hopper,    We are writing to inform you of your test results.    Your test results fall within the expected range(s) or remain unchanged from previous results.  Please continue with current treatment plan.    Please do not hesitate to give us a call if you have any questions or concerns.    Sincerely,      MD Ludivina Spencer CMA          Resulted Orders   TSH with free T4 reflex   Result Value Ref Range    TSH 0.53 0.40 - 4.00 mU/L

## 2018-06-19 NOTE — NURSING NOTE
Colletta Dwen Sorrell's goals for this visit include:   Chief Complaint   Patient presents with     Consult     Patient c/o 3 episodes of severe chest pain over the last 6 months. Denies previous evaluation or past cardiology providers.     She requests these members of her care team be copied on today's visit information: Amrita Tracey    PCP: Amrita Tracey    Referring Provider:  No referring provider defined for this encounter.    /75 (BP Location: Left arm, Patient Position: Sitting, Cuff Size: Adult Regular)  Pulse 68  Wt 141 lb 11.2 oz (64.3 kg)  SpO2 100%  BMI 23.58 kg/m2    Do you need any medication refills at today's visit? none  Liliana Woody, CMA

## 2018-06-19 NOTE — PROGRESS NOTES
"To Whom It May Concern:      It was a pleasure participating in the care of your patient, Ms. Colletta Sorrell.  As you know, she is a 58-year-old lady who I see today for chest discomfort.      Her past medical history is significant for the followin.  Multiple sclerosis.   2.  Multinodular thyroid.   3.  Anxiety.   4.  Tobacco abuse.      She denies having a significant history of cardiac disease.      In terms of her present symptom complex, over the past 6 months she has had 3 episodes of chest discomfort, each lasting 5 minutes apiece, all occurring while she is lying down, resting.  It is not accompanied by radiation, nausea, vomiting, diaphoresis or shortness of breath.  It does not get worse.  She cannot identify anything that brought it on or made it better.  However, it was manifested by very severe midsternal chest discomfort.  Her last episode was about a month and a half ago.      She leads a very sedentary lifestyle due to her MS, she is quite debilitated from it.  She has to use a cane and she cannot go for walks due to her physical disabilities.  She otherwise denies shortness of breath, PND, orthopnea, edema, palpitations, syncope or near-syncope.      In terms of her cardiac risk factors, no history of diabetes or hypertension.  She quit smoking 6 months ago, smoked less than a pack a day for 20 years.  Denies alcohol or drug use.  Denies family history of heart disease.  She says her cholesterol is \"okay.\"        She used to work on computers, currently disabled.      CURRENT MEDICATIONS:  Include vitamins, fish oil, melatonin, Ampyra.        PHYSICAL EXAMINATION:     VITAL SIGNS:  Blood pressure is 113/75 with a pulse 68.  Her weight is 141 pounds.   NECK:  Exam reveals no obvious jugular venous distention.   LUNGS:  Clear to auscultation.  Respiratory effort is normal.   CARDIAC:  Reveals a regular rate and rhythm, no obvious murmur or gallop appreciated.   ABDOMEN:  Belly soft, " nontender.   EXTREMITIES:  Without gross edema.      LABORATORY DATA:  2017 potassium 4.0, GFR normal, hemoglobin 13.6.      EKG today reveals normal sinus rhythm at a rate of 60 beats per minute, left axis deviation.        IMPRESSION:      Colletta is a 58-year-old lady whose past medical history is significant for multiple sclerosis and who presents with 3 episodes of severe midsternal chest discomfort over the past 6 months.  Her chest discomfort has mostly atypical features, but is concerning in that her 3 episodes occurred at rest and/or were quite severe in intensity in the mid sternal region of her chest.  Further noninvasive evaluation would be indicated.        PLAN:     1.  Echocardiogram to rule out significant structural pathology.     2.  Pharmacologic nuclear stress test in order to rule out significant inducible ischemia.     3.  She requested a TSH to be drawn today.     4.  Primary MD referral to consolidate her care.     5.  Further updates to follow pending above test results.      Once again, it was a pleasure participating in the care of your patient, Ms. Colletta Sorrell.   Please feel free to contact me anytime with any questions regarding her care in the future.         Sincerely,      RAJ EGAN MD             D: 2018   T: 2018   MT: BILL      Name:     SORRELL, COLLETTA   MRN:      [id number]        Account:      [id number]   :      1960      Document: [id number]       cc: Amrita Tracey MD

## 2018-06-19 NOTE — MR AVS SNAPSHOT
After Visit Summary   6/19/2018    Colletta Dwen Sorrell    MRN: [id number]           Patient Information     Date Of Birth          1960        Visit Information        Provider Department      6/19/2018 11:00 AM Mc Brown MD [facility]        Today's Diagnoses     Chest pain, unspecified type    -  1    Multiple sclerosis (H)        Neuropathy           Follow-ups after your visit        Additional Services     FAMILY PRACTICE REFERRAL       Your provider has referred you to: [facility]: Family Practice - [facility] locations [phone] [email or web address]    Please be aware that coverage of these services is subject to the terms and limitations of your health insurance plan.  Call member services at your health plan with any benefit or coverage questions.      Please bring the following with you to your appointment:    (1) Any X-Rays, CTs or MRIs which have been performed.  Contact the facility where they were done to arrange for  prior to your scheduled appointment.    (2) List of current medications   (3) This referral request   (4) Any documents/labs given to you for this referral                  Your next 10 appointments already scheduled     Jul 05, 2018 11:30 AM CDT   Ech Complete with MGECHR1, MG ECHO TECH   [facility])    [address] [place] [id number] [phone]           1. Please bring or wear a comfortable two-piece outfit. 2. You may eat, drink and take your normal medicines. 3. For any questions that cannot be answered, please contact the ordering physician            Jul 05, 2018  1:00 PM CDT   NM INJECTION with MGNMINJ1   [facility])    [address] [address] [place] [id number] [phone]            Jul 05, 2018  1:45 PM CDT   NM SCAN3 with MGNM1   [facility]    [address] [place] [id number] [phone]            Jul 05, 2018  2:15 PM CDT   Ekg Stress Nm Lexiscan with MG STRESS RM, MG IMAGING NURSE, MG PC CARD, MG CV TECH   [facility]    [address] [place] [phone]            Jul 05, 2018  2:45 PM CDT   NM MPI WITH LEXISCAN with MGNM1   [facility]    [address] [place] [phone]           For a ONE day exam: Allow 3-4 hours for test. For a TWO day exam: Allow  minutes PER day for test.  On the day of your resting scan: Please stop eating 3 hours before the test. You may drink water or juice.  On the day of your stress test: Stop all caffeine 12 hours before the test. This includes coffee, tea, soda pop, chocolate and certain medicines (such as Anacin, Excedrin and NoDoz). Also avoid decaf coffee and tea, as these contain small amounts of caffeine.  Stop eating 3 hours before the test. You may drink water.  You may need to stop some medicines before the test. Follow your doctor s orders. - If you take a beta blocker: Do not take your beta-blocker on the day before your test, unless specifically told to by your doctor. And do not take it on the day of your test. Bring it with you to take after the test. - If you take Aggrenox or dipyridamole (Persantine, Permole), stop taking it 48 hours before your test. - If you take Viagra, Cialis or Levitra, stop taking it 48 hours before your test. - If you take theophylline or aminophylline, stop taking it 12 hours before your test.  Do not take nitrates on the day of your test. Do not wear your Nitro-Patch.  Please wear a loose two-piece outfit. If you will have an exercise test, bring rubber-soled walking shoes.  When you arrive, please tell us if you have a pacemaker or ICD (implantable defibrillator).  Please call your  Imaging Department at your exam site with any questions.            Jul 10, 2018  2:30 PM CDT   (Arrive by 2:15 PM)   Return Multiple Sclerosis with Barrera Shannon MD   [facility] Multiple Sclerosis ([facility])    [address]  [address] [place] [phone] [phone]              Future tests that were ordered for you today     Open Future Orders        Priority Expected Expires Ordered    Echocardiogram Routine  2019    NM Lexiscan stress test Routine  2019            Who to contact     If you have questions or need follow up information about today's clinic visit or your schedule please contact [facility] directly at [phone].  Normal or non-critical lab and imaging results will be communicated to you by MyChart, letter or phone within 4 business days after the clinic has received the results. If you do not hear from us within 7 days, please contact the clinic through MyChart or phone. If you have a critical or abnormal lab result, we will notify you by phone as soon as possible.  Submit refill requests through [employer] or call your pharmacy and they will forward the refill request to us. Please allow 3 business days for your refill to be completed.          Additional Information About Your Visit        [employer] Information     [employer] is an electronic gateway that provides easy, online access to your medical records. With [employer], you can request a clinic appointment, read your test results, renew a prescription or communicate with your care team.     To sign up for [employer] visit the website at [email or web address]   You will be asked to enter the access code listed below, as well as some personal information. Please follow the directions to create your username and password.     Your access code is: [email or web address]  Expires: 2018 12:19 PM     Your access code will  in 90 days. If you need help or  a new code, please contact your [facility] or call [phone] for assistance.        Care EveryWhere ID     This is your Care EveryWhere ID. This could be used by other organizations to access your [place] medical records  [id number]        Your Vitals Were     Pulse Pulse Oximetry BMI (Body Mass Index)             68 100% 23.58 kg/m2          Blood Pressure from Last 3 Encounters:   06/19/18 113/75   01/09/18 123/71   11/21/17 117/65    Weight from Last 3 Encounters:   06/19/18 64.3 kg (141 lb 11.2 oz)   01/09/18 67 kg (147 lb 11.2 oz)   11/02/17 64.4 kg (142 lb)              We Performed the Following     EKG 12-lead complete w/read - Clinics     FAMILY PRACTICE REFERRAL     TSH with free T4 reflex          Today's Medication Changes          These changes are accurate as of 6/19/18 12:22 PM.  If you have any questions, ask your nurse or doctor.               These medicines have changed or have updated prescriptions.        Dose/Directions    Naltrexone Powd   This may have changed:  how much to take   Used for:  Multiple sclerosis (H)        Dose:  4 mg   Take 4 mg by mouth At Bedtime   Quantity:  1 Bottle   Refills:  3                Primary Care Provider Office Phone # Fax #    Amrita H SURENDRA Tracey [phone]       [facility] [place] [address] [address]        Equal Access to Services     [facility] AH: Hadii shon gomezo Sofernie, waaxda luqadaha, qaybta kaalmada chaz, eboni bangura . So [facility] [phone].    ATENCIÓN: Si habla español, tiene a vazquez disposición servicios gratuitos de asistencia lingüística. Llame al [phone].    We comply with applicable federal civil rights laws and [place] laws. We do not discriminate on the basis of race, color, national origin, age, disability, sex, sexual orientation, or gender identity.            Thank you!     Thank you for choosing [facility]  for your care. Our goal is always to provide you with excellent care. Hearing back from our patients is one way we can continue to improve our services. Please take a few minutes to complete the written survey that you may receive in the mail after your visit with us. Thank you!             Your Updated Medication List - Protect others around you: Learn how to safely use, store and throw away your medicines at www.disposemymeds.org.          This list is accurate as of 6/19/18 12:22 PM.  Always use your most recent med list.                   Brand Name Dispense Instructions for use Diagnosis    ALPHA LIPOIC ACID PO      Take 300 mg by mouth daily        AMPYRA 10 MG Tb12   Generic drug:  Dalfampridine      Take 1 tablet by mouth 2 times daily        CALCIUM-VITAMIN D PO      Take 1 tablet by mouth daily        cholecalciferol 1000 UNIT tablet    vitamin D3     Take 1 tablet by mouth daily.    Multiple sclerosis (H)       DAILY PROBIOTIC PO      Take 1 capsule by mouth daily        fish Oil 1200 MG capsule      Take 1,200 mg by mouth daily        Glatiramer Acetate 40 MG/ML Sosy      Inject 40 mg Subcutaneous    Multiple sclerosis (H)       MAGNESIUM OXIDE PO      Take 500 mg by mouth daily        melatonin 3 MG tablet      Take 3 mg by mouth nightly as needed    Multiple sclerosis (H)       MULTIVITAMIN ADULT PO      Take 1 tablet by mouth daily    Multiple sclerosis (H)       Naltrexone Powd     1 Bottle    Take 4 mg by mouth At Bedtime    Multiple sclerosis (H)       oxybutynin 10 MG 24 hr tablet    DITROPAN-XL     Take 1 tablet by mouth daily        VITAMIN B 12 PO      Take 2,500 mcg by mouth daily        VITAMIN B COMPLEX PO      Take 1 tablet by mouth as needed    Multiple sclerosis (H)       VITAMIN C PO      Take 1.5 teaspoonful by mouth daily

## 2018-07-03 ENCOUNTER — TELEPHONE (OUTPATIENT)
Dept: CARDIOLOGY | Facility: CLINIC | Age: 58
End: 2018-07-03

## 2018-07-03 NOTE — TELEPHONE ENCOUNTER
Called patient to review preparation prior to nuclear stress test (Lexiscan) scheduled for Jul 5 and to assess for any questions or concerns. Reminded patient to remain NPO for 3 hours prior to test with the exception of water, to remain free of caffeine (including decaf, chocolate or Excedrin) for 12 hr, to take all medications as scheduled especially for blood pressure. Pt verbalized understanding, all questions answered.

## 2018-07-05 ENCOUNTER — OFFICE VISIT (OUTPATIENT)
Dept: CARDIOLOGY | Facility: CLINIC | Age: 58
End: 2018-07-05
Attending: INTERNAL MEDICINE
Payer: COMMERCIAL

## 2018-07-05 ENCOUNTER — RADIANT APPOINTMENT (OUTPATIENT)
Dept: NUCLEAR MEDICINE | Facility: CLINIC | Age: 58
End: 2018-07-05
Attending: INTERNAL MEDICINE
Payer: COMMERCIAL

## 2018-07-05 VITALS — DIASTOLIC BLOOD PRESSURE: 56 MMHG | SYSTOLIC BLOOD PRESSURE: 108 MMHG | HEART RATE: 57 BPM

## 2018-07-05 DIAGNOSIS — R07.9 CHEST PAIN, UNSPECIFIED TYPE: Primary | ICD-10-CM

## 2018-07-05 DIAGNOSIS — R07.9 CHEST PAIN, UNSPECIFIED TYPE: ICD-10-CM

## 2018-07-05 PROCEDURE — 78452 HT MUSCLE IMAGE SPECT MULT: CPT | Performed by: INTERNAL MEDICINE

## 2018-07-05 PROCEDURE — A9502 TC99M TETROFOSMIN: HCPCS | Performed by: INTERNAL MEDICINE

## 2018-07-05 PROCEDURE — 93306 TTE W/DOPPLER COMPLETE: CPT

## 2018-07-05 PROCEDURE — 93016 CV STRESS TEST SUPVJ ONLY: CPT

## 2018-07-05 PROCEDURE — 93017 CV STRESS TEST TRACING ONLY: CPT

## 2018-07-05 RX ORDER — REGADENOSON 0.08 MG/ML
0.4 INJECTION, SOLUTION INTRAVENOUS ONCE
Status: COMPLETED | OUTPATIENT
Start: 2018-07-05 | End: 2018-07-05

## 2018-07-05 RX ADMIN — REGADENOSON 0.4 MG: 0.08 INJECTION, SOLUTION INTRAVENOUS at 14:33

## 2018-07-05 NOTE — NURSING NOTE
Patient presents today for resting echo ordered by MD.   Echo Tech provided patient education.    Echo technician completed resting echo. Data transferred to [facility] for final interpretation through [employer].   Patient education provided about cardiology interpretation and primary provider will be notified of results.    Charleen Mai RDCS

## 2018-07-05 NOTE — PROGRESS NOTES
IV was started in the right AC with a 24g Jelco catheter and resting images were completed.      Patient's IV site was injected by RN with 0.4mg's of Lexiscan (Regadenoson) over a 15 second period, followed by a 5-mL saline flush.  The Nuclear Tech injected the Myoview tracer through the IV site 20 seconds later.      Patient offered C/O: short of breath      Total dose of Lexiscan was 0.4mg's.   Lexiscan NDC# 8131-1902-61     Dr. Cisneros provided supervision of the tests performed today.

## 2018-07-05 NOTE — MR AVS SNAPSHOT
MRN:[id number]                      After Visit Summary   7/5/2018    Colletta Dwen Sorrell    MRN: [id number]           Visit Information        Provider Department      7/5/2018 2:15 PM MG CV TECH; MG PC CARD; MG IMAGING NURSE; MG STRESS RM [facility]        Your next 10 appointments already scheduled     Jul 05, 2018  2:45 PM CDT   NM MPI WITH LEXISCAN with MGNM1   [facility]    [address] [place] [address] [phone]           For a ONE day exam: Allow 3-4 hours for test. For a TWO day exam: Allow  minutes PER day for test.  On the day of your resting scan: Please stop eating 3 hours before the test. You may drink water or juice.  On the day of your stress test: Stop all caffeine 12 hours before the test. This includes coffee, tea, soda pop, chocolate and certain medicines (such as Anacin, Excedrin and NoDoz). Also avoid decaf coffee and tea, as these contain small amounts of caffeine.  Stop eating 3 hours before the test. You may drink water.  You may need to stop some medicines before the test. Follow your doctor s orders. - If you take a beta blocker: Do not take your beta-blocker on the day before your test, unless specifically told to by your doctor. And do not take it on the day of your test. Bring it with you to take after the test. - If you take Aggrenox or dipyridamole (Persantine, Permole), stop taking it 48 hours before your test. - If you take Viagra, Cialis or Levitra, stop taking it 48 hours before your test. - If you take theophylline or aminophylline, stop taking it 12 hours before your test.  Do not take nitrates on the day of your test. Do not wear your Nitro-Patch.  Please wear a loose two-piece outfit. If you will have an exercise test, bring rubber-soled walking shoes.  When you arrive, please tell us if you have a pacemaker or ICD (implantable defibrillator).  Please call your  Imaging Department at your exam site with any questions.            2018 11:00 AM CDT   EM[facility] with Prem Canela MD, [facility]    [address] [address] [place] [id number]   [phone]            Aug 07, 2018 10:30 AM CDT   (Arrive by 10:15 AM)   Return Multiple Sclerosis with Barrera Shannon MD   [facility] Multiple Sclerosis [facility])    [address] [facility]  [address] [place] [address] [phone]            Aug 30, 2018 10:00 AM CDT   Level 5 with BAY 4 INFUSION   [facility]    [address] [address] [place] [id number]   [phone]              MyChart Information     angelMD is an electronic gateway that provides easy, online access to your medical records. With angelMD, you can request a clinic appointment, read your test results, renew a prescription or communicate with your care team.     To sign up for EventCombot visit the website at [email or web address]   You will be asked to enter the access code listed below, as well as some personal information. Please follow the directions to create your username and password.     Your access code is: [email or web address]  Expires: 2018 12:19 PM     Your access code will  in 90 days. If you need help or a new code, please contact your [facility] or call [phone] for assistance.        Care EveryWhere ID     This is your Care EveryWhere ID. This could be used by other organizations to access your [place] medical records  [id number]        Equal Access to Services     LEIDY SHUKLA : Hadii aad ku hadasho Soomaali, waaxda luqadaha, qaybta kaalmada adeegyada, eboni ramirez. So [facility] [phone].    ATENCIÓN: Si habla español, tiene a vazquez disposición servicios gratuitos de asistencia lingüística. Llame al [phone].    We  comply with applicable federal civil rights laws and [place] laws. We do not discriminate on the basis of race, color, national origin, age, disability, sex, sexual orientation, or gender identity.

## 2018-08-20 ENCOUNTER — OFFICE VISIT (OUTPATIENT)
Dept: NEUROLOGY | Facility: CLINIC | Age: 58
End: 2018-08-20
Attending: FAMILY MEDICINE
Payer: COMMERCIAL

## 2018-08-20 DIAGNOSIS — R20.9 DISTURBANCE OF SKIN SENSATION: Primary | ICD-10-CM

## 2018-08-20 DIAGNOSIS — M79.602 LEFT ARM PAIN: ICD-10-CM

## 2018-08-20 PROCEDURE — 95886 MUSC TEST DONE W/N TEST COMP: CPT | Performed by: PSYCHIATRY & NEUROLOGY

## 2018-08-20 PROCEDURE — 95909 NRV CNDJ TST 5-6 STUDIES: CPT | Performed by: PSYCHIATRY & NEUROLOGY

## 2018-08-20 NOTE — LETTER
8/20/2018         RE: Colletta Dwen Sorrell  [address] [facility] [address]        Dear Colleague,    Thank you for referring your patient, Colletta Dwen Sorrell, to the [facility]. Please see a copy of my visit note below.    [facility] EMG Laboratory      Nerve Conduction & EMG Report          Patient:       Colletta Sorrell  Patient ID:    [id number]  Gender:        Female  YOB: 1960  Age:           58 Years 6 Months      History and Examination:  Colletta Sorrell is a 58 year old woman with a sensory disturbance radiating from the shoulder into the arm, forearm, and hand. Medical history is notable for multiple sclerosis with a cervical cord lesion but she also has cervical degenerative findings on imaging. She is referred for evaluation of possible radiculopathy.    Techniques:  Motor conduction studies were done with surface recording electrodes. Sensory conduction studies were performed with surface electrodes, unless indicated otherwise by (n), designating the use of subdermal recording electrodes. Temperature was monitored and recorded throughout the study. Upper extremities were maintained at a temperature of 32 degrees Centigrade or higher. EMG was done with a concentric needle electrode.     Results:  A left median sensory conduction study was normal. A left ulnar sensory conduction study demonstrated mild attenuation of amplitude and minimal slowing of conduction. A left median motor and F-response study was normal. Left ulnar motor and F-response studies demonstrated moderately severe conduction slowing across the elbow. Electromyography was normal.     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 There is no evidence of axonal injury to anterior or posterior primary rami of cervical nerve roots.       Prem Canela MD        Sensory  NCS      Nerve / Sites Rec. Site Onset Peak NP Amp Ref. PP Amp Dist Isac Ref. Temp     ms ms  V  V  V cm m/s m/s  C   L MEDIAN - Dig II      Dig II Wrist 2.50 3.18 18.1 10.0 24.0 14 56.0 48.0 33.6      Palm Wrist 1.51 2.14 24.1  38.2 8 53.0 48.0 33.6   L ULNAR - Dig V      Dig V Wrist 2.66 3.44 5.8 8.0 5.1 12.5 47.1 48.0 33.6       Motor NCS      Nerve / Sites Rec. Site Lat Ref. Amp Ref. Rel Amp Dist Isac Ref. Dur. Area Temp.     ms ms mV mV % cm m/s m/s ms %  C   L MEDIAN - APB      Wrist APB 3.65 4.40 11.8 5.0 100 8   8.28 100 33.5      Elbow APB 7.97  10.9  92.8 21 48.6 48.0 8.75 80.1 33.4   L ULNAR - ADM      Wrist ADM 2.97 3.50 8.2 5.0 100 8   7.40 100 32.3      B.Elbow ADM 6.46  8.1  98.8 17.5 50.1 48.0 7.60 85.6 32.3      A.Elbow ADM 9.22  7.7  93.5 10 36.2 48.0 7.92 83.2 32.3      Axilla ADM 10.47  7.8  94.8 7.5 60.0 48.0 7.97 81.9 32.3   L ULNAR - FDI      Wrist FDI 3.59  7.4  100    6.77 100 32.9      B.Elbow FDI 6.93  7.2  96.8 17 51.0  7.24 96.3 32.4      A.Elbow FDI 9.53  7.3  98.7 9 34.6  7.60 89 32.1       F  Wave      Nerve Min F Lat Max F Lat Mean FLat Temp.    ms ms ms  C   L MEDIAN 25.00 27.76 25.88 32.7   L ULNAR 29.43 31.30 30.01 32.6       EMG Summary Table     Spontaneous MUAP Recruitment    IA Fib PSW Fasc H.F. Amp Dur. PPP Pattern   L. PRON TERES N None None None None N N N N   L. EXT DIG COMM N None None None None N N N N   L. FIRST D INTEROSS N None None None None N N N N   L. DELTOID N None None None None N N N N   L. BICEPS N None None None None N N N N   L. C7 PSP N None None None None N N N N                          Again, thank you for allowing me to participate in the care of your patient.        Sincerely,        Prem Canela MD

## 2018-08-20 NOTE — PROGRESS NOTES
[facility] EMG Laboratory      Nerve Conduction & EMG Report          Patient:       Colletta Sorrell  Patient ID:    [id number]  Gender:        Female  YOB: 1960  Age:           58 Years 6 Months      History and Examination:  Colletta Sorrell is a 58 year old woman with a sensory disturbance radiating from the shoulder into the arm, forearm, and hand. Medical history is notable for multiple sclerosis with a cervical cord lesion but she also has cervical degenerative findings on imaging. She is referred for evaluation of possible radiculopathy.    Techniques:  Motor conduction studies were done with surface recording electrodes. Sensory conduction studies were performed with surface electrodes, unless indicated otherwise by (n), designating the use of subdermal recording electrodes. Temperature was monitored and recorded throughout the study. Upper extremities were maintained at a temperature of 32 degrees Centigrade or higher. EMG was done with a concentric needle electrode.     Results:  A left median sensory conduction study was normal. A left ulnar sensory conduction study demonstrated mild attenuation of amplitude and minimal slowing of conduction. A left median motor and F-response study was normal. Left ulnar motor and F-response studies demonstrated moderately severe conduction slowing across the elbow. Electromyography was normal.     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 There is no evidence of axonal injury to anterior or posterior primary rami of cervical nerve roots.       Prem Canela MD        Sensory NCS      Nerve / Sites Rec. Site Onset Peak NP Amp Ref. PP Amp Dist Isac Ref. Temp     ms ms  V  V  V cm m/s m/s  C   L MEDIAN - Dig II      Dig II Wrist 2.50 3.18 18.1 10.0 24.0 14 56.0 48.0 33.6      Palm Wrist 1.51 2.14 24.1  38.2 8 53.0 48.0 33.6   L ULNAR - Dig V       Dig V Wrist 2.66 3.44 5.8 8.0 5.1 12.5 47.1 48.0 33.6       Motor NCS      Nerve / Sites Rec. Site Lat Ref. Amp Ref. Rel Amp Dist Isac Ref. Dur. Area Temp.     ms ms mV mV % cm m/s m/s ms %  C   L MEDIAN - APB      Wrist APB 3.65 4.40 11.8 5.0 100 8   8.28 100 33.5      Elbow APB 7.97  10.9  92.8 21 48.6 48.0 8.75 80.1 33.4   L ULNAR - ADM      Wrist ADM 2.97 3.50 8.2 5.0 100 8   7.40 100 32.3      B.Elbow ADM 6.46  8.1  98.8 17.5 50.1 48.0 7.60 85.6 32.3      A.Elbow ADM 9.22  7.7  93.5 10 36.2 48.0 7.92 83.2 32.3      Axilla ADM 10.47  7.8  94.8 7.5 60.0 48.0 7.97 81.9 32.3   L ULNAR - FDI      Wrist FDI 3.59  7.4  100    6.77 100 32.9      B.Elbow FDI 6.93  7.2  96.8 17 51.0  7.24 96.3 32.4      A.Elbow FDI 9.53  7.3  98.7 9 34.6  7.60 89 32.1       F  Wave      Nerve Min F Lat Max F Lat Mean FLat Temp.    ms ms ms  C   L MEDIAN 25.00 27.76 25.88 32.7   L ULNAR 29.43 31.30 30.01 32.6       EMG Summary Table     Spontaneous MUAP Recruitment    IA Fib PSW Fasc H.F. Amp Dur. PPP Pattern   L. PRON TERES N None None None None N N N N   L. EXT DIG COMM N None None None None N N N N   L. FIRST D INTEROSS N None None None None N N N N   L. DELTOID N None None None None N N N N   L. BICEPS N None None None None N N N N   L. C7 PSP N None None None None N N N N

## 2018-08-20 NOTE — MR AVS SNAPSHOT
After Visit Summary   8/20/2018    Colletta Dwen Sorrell    MRN: [id number]           Patient Information     Date Of Birth          1960        Visit Information        Provider Department      8/20/2018 9:30 AM Prem Canela MD; PROC RM 1 PEDS [facility]        Today's Diagnoses     Disturbance of skin sensation    -  1    Left arm pain           Follow-ups after your visit        Your next 10 appointments already scheduled     Aug 30, 2018 10:00 AM CDT   Level 5 with BAY 4 INFUSION   [facility]    [address] [place] [id number] [phone]            Sep 04, 2018 10:30 AM CDT   (Arrive by 10:15 AM)   Return Multiple Sclerosis with Barrera Shannon MD   [facility] Multiple Sclerosis ([facility])    [address] [place] [address] [phone]              Future tests that were ordered for you today     Open Future Orders        Priority Expected Expires Ordered    Needle EMG Each Extremity w/Paraspinal Area Complete (80639) Routine  8/20/2019 8/20/2018            Who to contact     If you have questions or need follow up information about today's clinic visit or your schedule please contact [facility] directly at [phone].  Normal or non-critical lab and imaging results will be communicated to you by MyChart, letter or phone within 4 business days after the clinic has received the results. If you do not hear from us within 7 days, please contact the clinic through MyChart or phone. If you have a critical or abnormal lab result, we will notify you by phone as soon as possible.  Submit refill requests through Bawte or call your pharmacy and they will forward the refill request to us. Please allow 3 business days for your refill to be completed.          Additional Information About Your Visit        MyChart Information     Lontrat  is an electronic gateway that provides easy, online access to your medical records. With Impulsiv, you can request a clinic appointment, read your test results, renew a prescription or communicate with your care team.     To sign up for Impulsiv visit the website at [email or web address]   You will be asked to enter the access code listed below, as well as some personal information. Please follow the directions to create your username and password.     Your access code is: [email or web address]  Expires: 2018 12:19 PM     Your access code will  in 90 days. If you need help or a new code, please contact your [facility] or call [phone] for assistance.        Care EveryWhere ID     This is your Care EveryWhere ID. This could be used by other organizations to access your [place] medical records  [id number]         Blood Pressure from Last 3 Encounters:   18 108/56   18 113/75   18 123/71    Weight from Last 3 Encounters:   18 64.3 kg (141 lb 11.2 oz)   18 67 kg (147 lb 11.2 oz)   17 64.4 kg (142 lb)              We Performed the Following     EMG     NCS Motor with or without F-Wave, 3-4 nerves (01300)          Today's Medication Changes          These changes are accurate as of 18 10:40 AM.  If you have any questions, ask your nurse or doctor.               These medicines have changed or have updated prescriptions.        Dose/Directions    Naltrexone Powd   This may have changed:  how much to take   Used for:  Multiple sclerosis (H)        Dose:  4 mg   Take 4 mg by mouth At Bedtime   Quantity:  1 Bottle   Refills:  3                Primary Care Provider Office Phone # Fax #    Amrita H SURENDRA Tracey [phone]       [facility] [address]  [facility] [address]        Equal Access to Services     LEIDY SHUKLA AH: Min Bell, jeffrey zhao, eboni desai  nir elamismaelazul carter'aan ah. So [facility] [phone].    ATENCIÓN: Si christiela ivan, tiene a vazquez disposición servicios gratuitos de asistencia lingüística. Matthew al [phone].    We comply with applicable federal civil rights laws and [place] laws. We do not discriminate on the basis of race, color, national origin, age, disability, sex, sexual orientation, or gender identity.            Thank you!     Thank you for choosing [facility]  for your care. Our goal is always to provide you with excellent care. Hearing back from our patients is one way we can continue to improve our services. Please take a few minutes to complete the written survey that you may receive in the mail after your visit with us. Thank you!             Your Updated Medication List - Protect others around you: Learn how to safely use, store and throw away your medicines at www.disposemymeds.org.          This list is accurate as of 8/20/18 10:40 AM.  Always use your most recent med list.                   Brand Name Dispense Instructions for use Diagnosis    ALPHA LIPOIC ACID PO      Take 300 mg by mouth daily        AMPYRA 10 MG Tb12   Generic drug:  Dalfampridine      Take 1 tablet by mouth 2 times daily        CALCIUM-VITAMIN D PO      Take 1 tablet by mouth daily        cholecalciferol 1000 UNIT tablet    vitamin D3     Take 1 tablet by mouth daily.    Multiple sclerosis (H)       DAILY PROBIOTIC PO      Take 1 capsule by mouth daily        fish Oil 1200 MG capsule      Take 1,200 mg by mouth daily        Glatiramer Acetate 40 MG/ML Sosy      Inject 40 mg Subcutaneous    Multiple sclerosis (H)       MAGNESIUM OXIDE PO      Take 500 mg by mouth daily        melatonin 3 MG tablet      Take 3 mg by mouth nightly as needed    Multiple sclerosis (H)       MULTIVITAMIN ADULT PO      Take 1 tablet by mouth daily    Multiple sclerosis (H)       Naltrexone Powd     1 Bottle    Take 4 mg by mouth At Bedtime    Multiple sclerosis (H)        oxybutynin 10 MG 24 hr tablet    DITROPAN-XL     Take 1 tablet by mouth daily        VITAMIN B 12 PO      Take 2,500 mcg by mouth daily        VITAMIN B COMPLEX PO      Take 1 tablet by mouth as needed    Multiple sclerosis (H)       VITAMIN C PO      Take 1.5 teaspoonful by mouth daily

## 2018-08-22 ENCOUNTER — TELEPHONE (OUTPATIENT)
Dept: ORTHOPEDICS | Facility: CLINIC | Age: 58
End: 2018-08-22

## 2018-08-22 NOTE — TELEPHONE ENCOUNTER
Per huddle with Dr. Starkey, EMG shows moderate left ulnar neuropathy at the elbow. Dr. Starkey would like her to speak with a hand specialist to further discuss treatment options.   Called Pt and discussed results, set her up with an appt to go see Dr. Bowman at the .    Eldon Dillard RN

## 2018-08-23 ENCOUNTER — PRE VISIT (OUTPATIENT)
Dept: ORTHOPEDICS | Facility: CLINIC | Age: 58
End: 2018-08-23

## 2018-08-23 NOTE — TELEPHONE ENCOUNTER
FUTURE VISIT INFORMATION      FUTURE VISIT INFORMATION:    Date: 8/29    Time: 12:45    Location: [facility]  REFERRAL INFORMATION:    Referring provider:  Dr Starkey    Referring providers clinic:  [facility]    Reason for visit/diagnosis  Left ulnar neuropathy    RECORDS REQUESTED FROM:       Clinic name Comments Records Status Imaging Status                                         RECORDS STATUS      All records internal

## 2018-08-29 ENCOUNTER — OFFICE VISIT (OUTPATIENT)
Dept: ORTHOPEDICS | Facility: CLINIC | Age: 58
End: 2018-08-29
Payer: MEDICAID

## 2018-08-29 VITALS — HEIGHT: 64 IN | BODY MASS INDEX: 23.9 KG/M2 | WEIGHT: 140 LBS

## 2018-08-29 DIAGNOSIS — G56.22 CUBITAL TUNNEL SYNDROME ON LEFT: Primary | ICD-10-CM

## 2018-08-29 NOTE — NURSING NOTE
"Reason For Visit:   Chief Complaint   Patient presents with     Consult     The patient is here today with left arm discomort. She had an EMG that showed a compressed nerve in her left arm.        Primary MD: Amrita Tracey  Ref. MD: Prem Starkey    ?  No    Age: 58 year old    Occupation work from home  Date of injury: 3 years  Type of injury: unknown.  Smoker: No  Request smoking cessation information: No      Ht 1.626 m (5' 4\")  Wt 63.5 kg (140 lb)  BMI 24.03 kg/m2      Pain Assessment  Patient Currently in Pain: No (no pain, just discomfort. )    Hand Dominance Evaluation  Hand Dominance: Right  Pinch force  R hand key force: 5.897 kg (13 lb)  L hand key force: 5.443 kg (12 lb)   force  R hand  level 2 force: 24.9 kg (55 lb)  L hand  level  2 force: 18.1 kg (40 lb)    QuickDASH Assessment  No flowsheet data found.       Allergies   Allergen Reactions     Compazine [Prochlorperazine] Swelling     Swelling of tongue about 25 years ago       Humera Dye, ATC    "

## 2018-08-29 NOTE — PROGRESS NOTES
[facility]  Orthopedics  Mitch Bowman MD  2018     Name: Colletta Dwen Sorrell  MRN: [id number]  Age: 58 year old  : 1960  Referring provider: Prem Starkey     Chief Complaint: Left ring and small finger numbness and tingling    Date of onset: 3 years    History of Present Illness:   Colletta Dwen Sorrell is a 58 year old  right-hand-dominant female with a history of multiple sclerosis who presents today for evaluation of left ring and small finger numbness and tingling for the past 3 years. She also endorses tingling and discomfort that radiates from her neck down her shoulder, lateral elbow, and dorsal forearm to all of her fingers.  This has become more frequent and will wake her up at night.  Also, she has had a left-sided hemiplegia due to multiple sclerosis for the past 5 years; this is stable and has not progressed.  She does not think that her left hand  strength has significantly weakened in the past 3 years.  Denies any paresthesias or pain around the medial elbow that radiate into her hand.  Does not notice any changes in her paresthesias with elbow flexion.  An MRI of her cervical spine on 2017 showed grossly stable T2 hyperintense foci in the spinal cord and the right nohemi-cerebellar white matter.  There are degenerative changes within her cervical spine but no anisa foraminal stenosis or posterolateral spinal canal stenosis.  She underwent a cervical epidural injection on 2017, and this gave no relief.  An EMG performed by Dr. Prem tran on 2018 showed moderate left ulnar neuropathy at the elbow, with a decrease in left ulnar motor nerve velocity from 50.1 m/s proximal to the elbow to 36.2 m/s distal to the elbow.  There was no evidence of axonal injury to the anterior-posterior primary rami of the cervical nerve roots.  She was thus referred by her primary care provider to hand surgery for evaluation of left cubital tunnel syndrome.    Review of Systems:   A  14-point review of systems was obtained and is negative except for as noted in the HPI.     Medications:      ALPHA LIPOIC ACID PO, Take 300 mg by mouth daily, Disp: , Rfl:      AMPYRA 10 MG TB12, Take 1 tablet by mouth 2 times daily , Disp: , Rfl:      Ascorbic Acid (VITAMIN C PO), Take 1.5 teaspoonful by mouth daily , Disp: , Rfl:      B Complex Vitamins (VITAMIN B COMPLEX PO), Take 1 tablet by mouth as needed , Disp: , Rfl:      CALCIUM-VITAMIN D PO, Take 1 tablet by mouth daily, Disp: , Rfl:      cholecalciferol (VITAMIN D) 1000 UNIT tablet, Take 1 tablet by mouth daily., Disp: , Rfl:      Cyanocobalamin (VITAMIN B 12 PO), Take 2,500 mcg by mouth daily, Disp: , Rfl:      Glatiramer Acetate 40 MG/ML SOSY, Inject 40 mg Subcutaneous, Disp: , Rfl:      MAGNESIUM OXIDE PO, Take 500 mg by mouth daily , Disp: , Rfl:      melatonin 3 MG tablet, Take 3 mg by mouth nightly as needed , Disp: , Rfl:      Multiple Vitamins-Minerals (MULTIVITAMIN ADULT PO), Take 1 tablet by mouth daily , Disp: , Rfl:      Naltrexone POWD, Take 4 mg by mouth At Bedtime (Patient taking differently: Take 4.5 mg by mouth At Bedtime ), Disp: 1 Bottle, Rfl: 3     Omega-3 Fatty Acids (FISH OIL) 1200 MG capsule, Take 1,200 mg by mouth daily, Disp: , Rfl:      oxybutynin (DITROPAN-XL) 10 MG 24 hr tablet, Take 1 tablet by mouth daily, Disp: , Rfl:      Probiotic Product (DAILY PROBIOTIC PO), Take 1 capsule by mouth daily, Disp: , Rfl:     Allergies:  Compazine    Past Medical History:  Multiple sclerosis    Past Surgical History:  Right breast benign tumor removal, age 19.    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  Lives with her son and daughter in [place].  Former smoker.  Drinks alcohol every 2-3 months.  Remote history of marijuana use.  Denies other illicit drug use.  Community ambulator at baseline, uses a cane for assistance.    Family History:  No past pertinent family history.    Physical Examination:  GENERAL: Well-appearing  58-year-old female.  No apparent distress.  Alert and oriented appropriately.  HEENT: Atraumatic, normocephalic.  RESPIRATORY: Breathing unlabored on room air.  CARDIOVASCULAR: Extremities normal perfused throughout.  2+ radial pulses bilaterally.  MUSCULOSKELETAL: Focused examination of the left upper extremity shows palpable subluxation of the ulnar nerve anterior to the medial epicondyle with elbow flexion.  Negative Tinel's and Phalen's at the left elbow.  Positive scratch collapse test at the elbow.  Negative Tinel's, Phalen's, and scratch collapse test at the left wrist.  2 point discrimination of the left ring and small fingers on the radial and ulnar aspects is greater than 8 mm.  Two-point discrimination of the other digits is 5 mm.  Numbness in the ulnar nerve distribution subtively to light touch.  Sensation is grossly intact to light touch in the median and radial nerve distributions.  Fires EPL, FPL, and interossei with 5/5 strength.  No atrophy of the thenar or first dorsal interosseous muscles.  Negative Spurling's test.  Full, painless range of motion of the shoulder, elbow, and wrist.  Skin is closed and intact.    Electrodiagnostic Studies:   An electrodiagnostic study of the left extremities was available for review today.  This showed moderate left ulnar neuropathy at the elbow, with a decrease in left ulnar motor nerve velocity from 50.1 m/s proximal to the elbow to 36.2 m/s distal to the elbow.  There was no evidence of axonal injury to the anterior-posterior primary rami of the cervical nerve roots.    Assessment:   58 year old right hand dominant female, with a history of multiple sclerosis, presents symptoms and EMG suggestive of left cubital tunnel syndrome together with radiating arm pain.    Plan:   Discussed at length with the patient that while her cubital tunnel syndrome does not explain the entirety of her upper extremity symptoms, in particular the more global weakness she attributes  to her MS or the shooting pain from the shoulder down, it does explain the altered sensation in her ring and small fingers.  EMG showed moderate compression of the ulnar nerve at the elbow and no evidence of cervical radiculopathy. C-spine injection was not helpful subjectively. I discussed treatment options for cubital tunnel syndrome: Continued observation, extension splinting, and surgical intervention, comprised of cubital tunnel release and possible ulnar nerve transposition. I discussed that I do not expect it to address her radiating arm pain and that it may or may not improve her numbness and tingling. She'd like to proceed surgically. I discussed risks of surgery with her including postoperative pain, scarring, surgical site numbness, potential damage to nerves or blood vessels, bleeding, infection, recurrence or persistence of symptoms, and the need for more surgery. She expressed understanding. She uses a cane with her right hand, not her left, fortunately. I discussed the expected recovery. Informed consent was obtained. She would like to schedule surgery for the end of September and we will contact her with further details.    This patient was seen and discussed with Dr. Bowman, agrees with assessment and plan as above.    [facility]  Orthopaedic PGY4      I, Mitch Bowman, saw and evaluated this patient with the resident and agree with the resident s findings and plan of care as documented in the resident s note.

## 2018-08-29 NOTE — MR AVS SNAPSHOT
"              After Visit Summary   8/29/2018    Colletta Dwen Sorrell    MRN: [id number]           Patient Information     Date Of Birth          1960        Visit Information        Provider Department      8/29/2018 12:45 PM Mitch Bowman MD [facility]        Today's Diagnoses     Cubital tunnel syndrome on left    -  1       Follow-ups after your visit        Your next 10 appointments already scheduled     Sep 04, 2018 10:30 AM CDT   (Arrive by 10:15 AM)   Return Multiple Sclerosis with Barrera Shannon MD   [facility] Multiple Sclerosis ([facility])    [address] [place] [address]  [address] [place] [id number] [phone]              Who to contact     Please call your clinic at [phone] to:    Ask questions about your health    Make or cancel appointments    Discuss your medicines    Learn about your test results    Speak to your doctor            Additional Information About Your Visit        Care EveryWhere ID     This is your Care EveryWhere ID. This could be used by other organizations to access your [place] medical records  [id number]        Your Vitals Were     Height BMI (Body Mass Index)                1.626 m (5' 4\") 24.03 kg/m2           Blood Pressure from Last 3 Encounters:   07/05/18 108/56   06/19/18 113/75   01/09/18 123/71    Weight from Last 3 Encounters:   08/29/18 63.5 kg (140 lb)   06/19/18 64.3 kg (141 lb 11.2 oz)   01/09/18 67 kg (147 lb 11.2 oz)              Today, you had the following     No orders found for display         Today's Medication Changes          These changes are accurate as of 8/29/18  5:24 PM.  If you have any questions, ask your nurse or doctor.               These medicines have changed or have updated prescriptions.        Dose/Directions    Naltrexone Powd   This may have changed:  how much to take   Used for:  Multiple sclerosis (H)        Dose:  4 mg   Take 4 mg by mouth At Bedtime   Quantity:  1 " Bottle   Refills:  3                Primary Care Provider Office Phone # Fax #    Amrita H O Alexy [phone]       [facility] [address]  [facility] [address]        Equal Access to Services     LEIDY SHUKLA : Hadii shon giron hadpageo Soomaali, waaxda luqadaha, qaybta kaalmada adeegyada, eboni kristyn hayjhonatann karenbasilio warrenazul ramirez. So [facility] [phone].    ATENCIÓN: Si habla español, tiene a vazquez disposición servicios gratuitos de asistencia lingüística. Llame al [phone].    We comply with applicable federal civil rights laws and [place] laws. We do not discriminate on the basis of race, color, national origin, age, disability, sex, sexual orientation, or gender identity.            Thank you!     Thank you for choosing [facility]  for your care. Our goal is always to provide you with excellent care. Hearing back from our patients is one way we can continue to improve our services. Please take a few minutes to complete the written survey that you may receive in the mail after your visit with us. Thank you!             Your Updated Medication List - Protect others around you: Learn how to safely use, store and throw away your medicines at www.disposemymeds.org.          This list is accurate as of 8/29/18  5:24 PM.  Always use your most recent med list.                   Brand Name Dispense Instructions for use Diagnosis    ALPHA LIPOIC ACID PO      Take 300 mg by mouth daily        AMPYRA 10 MG Tb12   Generic drug:  Dalfampridine      Take 1 tablet by mouth 2 times daily        CALCIUM-VITAMIN D PO      Take 1 tablet by mouth daily        cholecalciferol 1000 UNIT tablet    vitamin D3     Take 1 tablet by mouth daily.    Multiple sclerosis (H)       DAILY PROBIOTIC PO      Take 1 capsule by mouth daily        fish Oil 1200 MG capsule      Take 1,200 mg by mouth daily        Glatiramer Acetate 40 MG/ML Sosy      Inject 40 mg Subcutaneous    Multiple sclerosis (H)        MAGNESIUM OXIDE PO      Take 500 mg by mouth daily        melatonin 3 MG tablet      Take 3 mg by mouth nightly as needed    Multiple sclerosis (H)       MULTIVITAMIN ADULT PO      Take 1 tablet by mouth daily    Multiple sclerosis (H)       Naltrexone Powd     1 Bottle    Take 4 mg by mouth At Bedtime    Multiple sclerosis (H)       oxybutynin 10 MG 24 hr tablet    DITROPAN-XL     Take 1 tablet by mouth daily        VITAMIN B 12 PO      Take 2,500 mcg by mouth daily        VITAMIN B COMPLEX PO      Take 1 tablet by mouth as needed    Multiple sclerosis (H)       VITAMIN C PO      Take 1.5 teaspoonful by mouth daily

## 2018-08-29 NOTE — LETTER
2018       RE: Colletta Dwen Sorrell  [address] [facility] [address]     Dear Colleague,    Thank you for referring your patient, Colletta Dwen Sorrell, to the [facility]. Please see a copy of my visit note below.    [facility]  Orthopedics  Mitch Bowman MD  2018     Name: Colletta Dwen Sorrell  MRN: [id number]  Age: 58 year old  : 1960  Referring provider: Prem Starkey     Chief Complaint: Left ring and small finger numbness and tingling    Date of onset: 3 years    History of Present Illness:   Colletta Dwen Sorrell is a 58 year old  right-hand-dominant female with a history of multiple sclerosis who presents today for evaluation of left ring and small finger numbness and tingling for the past 3 years. She also endorses tingling and discomfort that radiates from her neck down her shoulder, lateral elbow, and dorsal forearm to all of her fingers.  This has become more frequent and will wake her up at night.  Also, she has had a left-sided hemiplegia due to multiple sclerosis for the past 5 years; this is stable and has not progressed.  She does not think that her left hand  strength has significantly weakened in the past 3 years.  Denies any paresthesias or pain around the medial elbow that radiate into her hand.  Does not notice any changes in her paresthesias with elbow flexion.  An MRI of her cervical spine on 2017 showed grossly stable T2 hyperintense foci in the spinal cord and the right nohemi-cerebellar white matter.  There are degenerative changes within her cervical spine but no anisa foraminal stenosis or posterolateral spinal canal stenosis.  She underwent a cervical epidural injection on 2017, and this gave no relief.  An EMG performed by Dr. Prem tran on 2018 showed moderate left ulnar neuropathy at the elbow, with a decrease in left ulnar motor nerve velocity from 50.1  m/s proximal to the elbow to 36.2 m/s distal to the elbow.  There was no evidence of axonal injury to the anterior-posterior primary rami of the cervical nerve roots.  She was thus referred by her primary care provider to hand surgery for evaluation of left cubital tunnel syndrome.    Medications:      ALPHA LIPOIC ACID PO, Take 300 mg by mouth daily, Disp: , Rfl:      AMPYRA 10 MG TB12, Take 1 tablet by mouth 2 times daily , Disp: , Rfl:      Ascorbic Acid (VITAMIN C PO), Take 1.5 teaspoonful by mouth daily , Disp: , Rfl:      B Complex Vitamins (VITAMIN B COMPLEX PO), Take 1 tablet by mouth as needed , Disp: , Rfl:      CALCIUM-VITAMIN D PO, Take 1 tablet by mouth daily, Disp: , Rfl:      cholecalciferol (VITAMIN D) 1000 UNIT tablet, Take 1 tablet by mouth daily., Disp: , Rfl:      Cyanocobalamin (VITAMIN B 12 PO), Take 2,500 mcg by mouth daily, Disp: , Rfl:      Glatiramer Acetate 40 MG/ML SOSY, Inject 40 mg Subcutaneous, Disp: , Rfl:      MAGNESIUM OXIDE PO, Take 500 mg by mouth daily , Disp: , Rfl:      melatonin 3 MG tablet, Take 3 mg by mouth nightly as needed , Disp: , Rfl:      Multiple Vitamins-Minerals (MULTIVITAMIN ADULT PO), Take 1 tablet by mouth daily , Disp: , Rfl:      Naltrexone POWD, Take 4 mg by mouth At Bedtime (Patient taking differently: Take 4.5 mg by mouth At Bedtime ), Disp: 1 Bottle, Rfl: 3     Omega-3 Fatty Acids (FISH OIL) 1200 MG capsule, Take 1,200 mg by mouth daily, Disp: , Rfl:      oxybutynin (DITROPAN-XL) 10 MG 24 hr tablet, Take 1 tablet by mouth daily, Disp: , Rfl:      Probiotic Product (DAILY PROBIOTIC PO), Take 1 capsule by mouth daily, Disp: , Rfl:     Allergies:  Compazine    Past Medical History:  Multiple sclerosis    Past Surgical History:  Right breast benign tumor removal, age 19.    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  Lives with her son and daughter in [place].  Former smoker.  Drinks alcohol every 2-3 months.  Remote history of marijuana use.   Denies other illicit drug use.  Community ambulator at baseline, uses a cane for assistance.    Family History:  No past pertinent family history.    Physical Examination:  GENERAL: Well-appearing 58-year-old female.  No apparent distress.  Alert and oriented appropriately.  HEENT: Atraumatic, normocephalic.  RESPIRATORY: Breathing unlabored on room air.  CARDIOVASCULAR: Extremities normal perfused throughout.  2+ radial pulses bilaterally.  MUSCULOSKELETAL: Focused examination of the left upper extremity shows palpable subluxation of the ulnar nerve anterior to the medial epicondyle with elbow flexion.  Negative Tinel's and Phalen's at the left elbow.  Positive scratch collapse test at the elbow.  Negative Tinel's, Phalen's, and scratch collapse test at the left wrist.  2 point discrimination of the left ring and small fingers on the radial and ulnar aspects is greater than 8 mm.  Two-point discrimination of the other digits is 5 mm.  Numbness in the ulnar nerve distribution subtively to light touch.  Sensation is grossly intact to light touch in the median and radial nerve distributions.  Fires EPL, FPL, and interossei with 5/5 strength.  No atrophy of the thenar or first dorsal interosseous muscles.  Negative Spurling's test.  Full, painless range of motion of the shoulder, elbow, and wrist.  Skin is closed and intact.    Electrodiagnostic Studies:   An electrodiagnostic study of the left extremities was available for review today.  This showed moderate left ulnar neuropathy at the elbow, with a decrease in left ulnar motor nerve velocity from 50.1 m/s proximal to the elbow to 36.2 m/s distal to the elbow.  There was no evidence of axonal injury to the anterior-posterior primary rami of the cervical nerve roots.    Assessment:   58 year old right hand dominant female, with a history of multiple sclerosis, presents symptoms and EMG suggestive of left cubital tunnel syndrome together with radiating arm  pain.    Plan:   Discussed at length with the patient that while her cubital tunnel syndrome does not explain the entirety of her upper extremity symptoms, in particular the more global weakness she attributes to her MS or the shooting pain from the shoulder down, it does explain the altered sensation in her ring and small fingers.  EMG showed moderate compression of the ulnar nerve at the elbow and no evidence of cervical radiculopathy. C-spine injection was not helpful subjectively. I discussed treatment options for cubital tunnel syndrome: Continued observation, extension splinting, and surgical intervention, comprised of cubital tunnel release and possible ulnar nerve transposition. I discussed that I do not expect it to address her radiating arm pain and that it may or may not improve her numbness and tingling. She'd like to proceed surgically. I discussed risks of surgery with her including postoperative pain, scarring, surgical site numbness, potential damage to nerves or blood vessels, bleeding, infection, recurrence or persistence of symptoms, and the need for more surgery. She expressed understanding. She uses a cane with her right hand, not her left, fortunately. I discussed the expected recovery. Informed consent was obtained. She would like to schedule surgery for the end of September and we will contact her with further details.    This patient was seen and discussed with Dr. Bowman, agrees with assessment and plan as above.    [facility]  Orthopaedic PGY4      I, Mitch Bowman, saw and evaluated this patient with the resident and agree with the resident s findings and plan of care as documented in the resident s note.       Again, thank you for allowing me to participate in the care of your patient.      Sincerely,    Mitch Bowman MD

## 2018-08-30 ENCOUNTER — PATIENT OUTREACH (OUTPATIENT)
Dept: CARE COORDINATION | Facility: CLINIC | Age: 58
End: 2018-08-30

## 2018-09-04 ENCOUNTER — OFFICE VISIT (OUTPATIENT)
Dept: NEUROLOGY | Facility: CLINIC | Age: 58
End: 2018-09-04
Attending: PSYCHIATRY & NEUROLOGY
Payer: COMMERCIAL

## 2018-09-04 VITALS
HEIGHT: 64 IN | OXYGEN SATURATION: 98 % | HEART RATE: 62 BPM | SYSTOLIC BLOOD PRESSURE: 103 MMHG | WEIGHT: 141.4 LBS | DIASTOLIC BLOOD PRESSURE: 70 MMHG | BODY MASS INDEX: 24.14 KG/M2 | RESPIRATION RATE: 20 BRPM

## 2018-09-04 DIAGNOSIS — G35 MS (MULTIPLE SCLEROSIS) (H): Primary | ICD-10-CM

## 2018-09-04 PROCEDURE — G0463 HOSPITAL OUTPT CLINIC VISIT: HCPCS | Mod: ZF

## 2018-09-04 ASSESSMENT — PAIN SCALES - GENERAL: PAINLEVEL: NO PAIN (0)

## 2018-09-04 NOTE — MR AVS SNAPSHOT
"              After Visit Summary   9/4/2018    Colletta Dwen Sorrell    MRN: [id number]           Patient Information     Date Of Birth          1960        Visit Information        Provider Department      9/4/2018 10:30 AM Barrera Shannon MD [facility] Multiple Sclerosis         Follow-ups after your visit        Follow-up notes from your care team     Return in about 7 months (around 4/4/2019).      Your next 10 appointments already scheduled     Apr 09, 2019 10:00 AM CDT   (Arrive by 9:45 AM)   Return Multiple Sclerosis with Barrera Shannon MD   [facility] ([facility])    [address]  [address] [place] [id number] [phone]              Who to contact     If you have questions or need follow up information about today's clinic visit or your schedule please contact [facility] MULTIPLE SCLEROSIS directly at [phone].  Normal or non-critical lab and imaging results will be communicated to you by MyChart, letter or phone within 4 business days after the clinic has received the results. If you do not hear from us within 7 days, please contact the clinic through MyChart or phone. If you have a critical or abnormal lab result, we will notify you by phone as soon as possible.  Submit refill requests through [employer] or call your pharmacy and they will forward the refill request to us. Please allow 3 business days for your refill to be completed.          Additional Information About Your Visit        Care EveryWhere ID     This is your Care EveryWhere ID. This could be used by other organizations to access your [place] medical records  [id number]        Your Vitals Were     Pulse Respirations Height Pulse Oximetry BMI (Body Mass Index)       62 20 1.626 m (5' 4\") 98% 24.27 kg/m2        Blood Pressure from Last 3 Encounters:   09/04/18 103/70   07/05/18 108/56   06/19/18 113/75    Weight from Last 3 Encounters:   09/04/18 64.1 kg (141 " lb 6.4 oz)   08/29/18 63.5 kg (140 lb)   06/19/18 64.3 kg (141 lb 11.2 oz)              Today, you had the following     No orders found for display         Today's Medication Changes          These changes are accurate as of 9/4/18 11:08 AM.  If you have any questions, ask your nurse or doctor.               These medicines have changed or have updated prescriptions.        Dose/Directions    Naltrexone Powd   This may have changed:  how much to take   Used for:  Multiple sclerosis (H)        Dose:  4 mg   Take 4 mg by mouth At Bedtime   Quantity:  1 Bottle   Refills:  3                Primary Care Provider Office Phone # Fax #    Amrita H O Alexy [phone]       [facility] [address] [address]        Equal Access to Services     [facility] : Hadii shon diaz Sofernie, waaxda luqadaha, qaybta kaalmada adeegyada, eboni bangura . So [facility] [phone].    ATENCIÓN: Si habla español, tiene a vazquez disposición servicios gratuitos de asistencia lingüística. Matthew al [phone].    We comply with applicable federal civil rights laws and [place] laws. We do not discriminate on the basis of race, color, national origin, age, disability, sex, sexual orientation, or gender identity.            Thank you!     Thank you for choosing [facility] MULTIPLE SCLEROSIS  for your care. Our goal is always to provide you with excellent care. Hearing back from our patients is one way we can continue to improve our services. Please take a few minutes to complete the written survey that you may receive in the mail after your visit with us. Thank you!             Your Updated Medication List - Protect others around you: Learn how to safely use, store and throw away your medicines at www.disposemymeds.org.          This list is accurate as of 9/4/18 11:08 AM.  Always use your most recent med list.                   Brand Name Dispense Instructions for use  Diagnosis    ALPHA LIPOIC ACID PO      Take 300 mg by mouth daily        AMPYRA 10 MG Tb12   Generic drug:  Dalfampridine      Take 1 tablet by mouth 2 times daily        CALCIUM-VITAMIN D PO      Take 1 tablet by mouth daily        cholecalciferol 1000 UNIT tablet    vitamin D3     Take 1 tablet by mouth daily.    Multiple sclerosis (H)       DAILY PROBIOTIC PO      Take 1 capsule by mouth daily        fish Oil 1200 MG capsule      Take 1,200 mg by mouth daily        Glatiramer Acetate 40 MG/ML Sosy      Inject 40 mg Subcutaneous    Multiple sclerosis (H)       MAGNESIUM OXIDE PO      Take 500 mg by mouth daily        melatonin 3 MG tablet      Take 3 mg by mouth nightly as needed    Multiple sclerosis (H)       MULTIVITAMIN ADULT PO      Take 1 tablet by mouth daily    Multiple sclerosis (H)       Naltrexone Powd     1 Bottle    Take 4 mg by mouth At Bedtime    Multiple sclerosis (H)       oxybutynin 10 MG 24 hr tablet    DITROPAN-XL     Take 1 tablet by mouth daily        VITAMIN B 12 PO      Take 2,500 mcg by mouth daily        VITAMIN B COMPLEX PO      Take 1 tablet by mouth as needed    Multiple sclerosis (H)       VITAMIN C PO      Take 1.5 teaspoonful by mouth daily

## 2018-09-04 NOTE — LETTER
"9/4/2018      RE: Colletta Dwen Sorrell  [address] [address]       Service Date: 09/04/2018      REASON FOR VISIT:  Colletta Sorrell  is a 58-year-old woman who I follow for multiple sclerosis.  She returns for routine followup today.  I last saw her in 01/2018.      HISTORY OF PRESENT ILLNESS:  When I saw her last January, Colletta was about due for her second \"round\" of ocrelizumab.  She did not get that infusion as she tells me she has been \"skeptical\" of the infusions and wanted to discuss it with me first.  She also wanted to talk about stem cell transplant in MS and we discussed both those issues today.  I told her that hematopoietic stem cell transplant is effective at reducing relapses but there is no evidence of it slowing or stopping the progressive stages of MS.  We revisited our discussion of ocrelizumab including the evidence of efficacy where it showed a modest benefit in improving the odds of staying neurologically stable.  I told her there are essentially no proven risks of this medicine other than low risk of infusion reaction.      PHYSICAL EXAMINATION:   VITAL SIGNS:  Blood pressure 103/70.  Pulse 62.  Weight 141 pounds.   GENERAL:  She is alert and oriented.  Affect is bright.  Language functions are normal.     NEUROLOGIC:  Cranial nerves are unremarkable.  She has a left hemiparesis with shoulder abduction and wrist extension and finger extension, all graded 4/5, hip flexion 3/5, knee flexion 4/5.  There is mild weakness of hip flexion on the right as well.  She has a spastic, ataxic, left hemiparetic gait.  She uses a 4-point cane.  She furniture walks at home.      IMPRESSION:  Primary progressive multiple sclerosis, fairly stable after an initial 2 half doses of ocrelizumab 1 year ago.      I spent 27 minutes with Colletta today, greater than 50% of which was spent in counseling and coordination of care.  Primarily we discussed the ocrelizumab and the pros " and cons of that.  I explained that while the prescribing instructions describe dosing at every 6 months, I think that is too frequent for most patients and prefer to dose it based on rate of B-cell repopulation.  She will go ahead and call to schedule her infusions at [facility].  She tells me she had an appointment there for an infusion last week, but she canceled it, wanting to discuss the matter with me first.      PLAN:   1.  She will call and get her infusion of 600 mg.   2.  I will have her follow up with me in about 7 months, at which point we will begin testing B cell counts.         D: 2018   T: 2018   MT: CRYSTAL      Name:     SORRELL, COLLETTA   MRN:      [id number]        Account:      [id number]   :      1960           Service Date: 2018      Document: [id number]        Barrera Shannon MD

## 2018-09-04 NOTE — NURSING NOTE
"Chief Complaint   Patient presents with     RECHECK     [facility] RETURN - MULTIPLE SCLEROSIS       Initial /70 (BP Location: Right arm, Patient Position: Sitting, Cuff Size: Adult Regular)  Pulse 62  Resp 20  Ht 1.626 m (5' 4\")  Wt 64.1 kg (141 lb 6.4 oz)  SpO2 98%  BMI 24.27 kg/m2 Estimated body mass index is 24.27 kg/(m^2) as calculated from the following:    Height as of this encounter: 1.626 m (5' 4\").    Weight as of this encounter: 64.1 kg (141 lb 6.4 oz)..  BP completed using cuff size: regular  Medications Reconciled: Yes  Asia Ivory CMA  10:42 AM          "

## 2018-09-04 NOTE — PROGRESS NOTES
"Service Date: 09/04/2018      REASON FOR VISIT:  Colletta Sorrell  is a 58-year-old woman who I follow for multiple sclerosis.  She returns for routine followup today.  I last saw her in 01/2018.      HISTORY OF PRESENT ILLNESS:  When I saw her last January, Colletta was about due for her second \"round\" of ocrelizumab.  She did not get that infusion as she tells me she has been \"skeptical\" of the infusions and wanted to discuss it with me first.  She also wanted to talk about stem cell transplant in MS and we discussed both those issues today.  I told her that hematopoietic stem cell transplant is effective at reducing relapses but there is no evidence of it slowing or stopping the progressive stages of MS.  We revisited our discussion of ocrelizumab including the evidence of efficacy where it showed a modest benefit in improving the odds of staying neurologically stable.  I told her there are essentially no proven risks of this medicine other than low risk of infusion reaction.      PHYSICAL EXAMINATION:   VITAL SIGNS:  Blood pressure 103/70.  Pulse 62.  Weight 141 pounds.   GENERAL:  She is alert and oriented.  Affect is bright.  Language functions are normal.     NEUROLOGIC:  Cranial nerves are unremarkable.  She has a left hemiparesis with shoulder abduction and wrist extension and finger extension, all graded 4/5, hip flexion 3/5, knee flexion 4/5.  There is mild weakness of hip flexion on the right as well.  She has a spastic, ataxic, left hemiparetic gait.  She uses a 4-point cane.  She furniture walks at home.      IMPRESSION:  Primary progressive multiple sclerosis, fairly stable after an initial 2 half doses of ocrelizumab 1 year ago.      I spent 27 minutes with Colletta today, greater than 50% of which was spent in counseling and coordination of care.  Primarily we discussed the ocrelizumab and the pros and cons of that.  I explained that while the prescribing instructions describe dosing at every 6 " months, I think that is too frequent for most patients and prefer to dose it based on rate of B-cell repopulation.  She will go ahead and call to schedule her infusions at [facility].  She tells me she had an appointment there for an infusion last week, but she canceled it, wanting to discuss the matter with me first.      PLAN:   1.  She will call and get her infusion of 600 mg.   2.  I will have her follow up with me in about 7 months, at which point we will begin testing B cell counts.      MD FAHAD Candelario MD             D: 2018   T: 2018   MT: CRYSTAL      Name:     SORRELL, COLLETTA   MRN:      [id number]        Account:      [id number]   :      1960           Service Date: 2018      Document: [id number]

## 2018-09-06 ENCOUNTER — TELEPHONE (OUTPATIENT)
Dept: ORTHOPEDICS | Facility: CLINIC | Age: 58
End: 2018-09-06

## 2018-09-06 NOTE — TELEPHONE ENCOUNTER
Patient is scheduled for surgery with Dr. Bowman      Spoke or left message with: patient    Date of Surgery: ASC    Location: 10/18/18    H&P: Scheduled with primary clinic with in 30 days prior to surgery.    Surgery packet: will be mailed out to patient when nurse calls patient to pre op her over the phone.     Additional comments: will wait for pre op phone call 1-2 days prior to surgery for arrival time.

## 2018-09-10 ENCOUNTER — TELEPHONE (OUTPATIENT)
Dept: ORTHOPEDICS | Facility: CLINIC | Age: 58
End: 2018-09-10

## 2018-09-10 NOTE — TELEPHONE ENCOUNTER
Called patient to review pre-op instructions for surgery with Dr. Bowman.  She understands she will need a pre-op history and physical within 30 days.  Preop packet will be mailed to patient today.  She has our phone number for further questions or concerns.

## 2018-09-17 NOTE — TELEPHONE ENCOUNTER
[address] [place]           [address]    Yolanda LOUIS Johnnie  1970  [phone]    PATIENT REFUSAL OF TREATMENT      This is to certify that I am discontinuing medical services at my own insistence  and against the advice of the attending physician.    I have been advised by Steve Longo MD of the dangers of   discontinuing medical services at this time and the alternatives.    I assume all responsibility for refusing recommended medical services; and I   hereby release [facility], its employees and physicians from all  liability.    Patient name:  Yolanda Blair      YOB: 1970  Date: 9/17/2018    Chief Complaint:   Chief Complaint   Patient presents with   • Animal Bite     pt states she was bit by a cat this morning at about 11am, pt states the cat is not up to date with immunizations(pt contacted pcp and was put on augmentin this afternoon and health dept was contacted)  c/o right index finger swelling/pain/unable to bend finger/redness spreading into hand.        Services refused: Refused Transport to ER via [facility]    Mode of transportation from clinic: Self    Comments: None            Patient/Legal Guardian Signature: ________________________________________      Provider Signature/ Title:________________________________________________                     Dr. Steve Longo    Witness Signature/ Title: ____________________________________________        Elissa Keenan RN    Yolanda Fox HERIBERTO Blair  1970  [id number]     Records received from [facility].   Included  Office notes: 7/20/15, 7/29/14, 11/18/13, 5/28/13, 3/5/13, 2/5/13, 1/7/13  Radiology reports: MRI brain on 1/31/13   MR cervical on 1/15/13   MR cervical on 7/2/14- [facility]    MRI brain on 7/2/14- [facility]     Missing/needed records: lumbar puncture

## 2018-09-18 ENCOUNTER — OFFICE VISIT (OUTPATIENT)
Dept: PEDIATRICS | Facility: CLINIC | Age: 58
End: 2018-09-18
Payer: COMMERCIAL

## 2018-09-18 VITALS
OXYGEN SATURATION: 99 % | TEMPERATURE: 98.4 F | SYSTOLIC BLOOD PRESSURE: 114 MMHG | DIASTOLIC BLOOD PRESSURE: 78 MMHG | BODY MASS INDEX: 24.17 KG/M2 | WEIGHT: 140.8 LBS | HEART RATE: 64 BPM

## 2018-09-18 DIAGNOSIS — F17.200 TOBACCO USE DISORDER: ICD-10-CM

## 2018-09-18 DIAGNOSIS — Z23 NEED FOR TDAP VACCINATION: ICD-10-CM

## 2018-09-18 DIAGNOSIS — G56.22 CUBITAL TUNNEL SYNDROME, LEFT: ICD-10-CM

## 2018-09-18 DIAGNOSIS — E04.2 MULTINODULAR THYROID: ICD-10-CM

## 2018-09-18 DIAGNOSIS — R07.89 ATYPICAL CHEST PAIN: ICD-10-CM

## 2018-09-18 DIAGNOSIS — Z01.818 PREOP GENERAL PHYSICAL EXAM: Primary | ICD-10-CM

## 2018-09-18 DIAGNOSIS — G35 MULTIPLE SCLEROSIS (H): ICD-10-CM

## 2018-09-18 PROCEDURE — 90471 IMMUNIZATION ADMIN: CPT | Performed by: FAMILY MEDICINE

## 2018-09-18 PROCEDURE — 99204 OFFICE O/P NEW MOD 45 MIN: CPT | Mod: 25 | Performed by: FAMILY MEDICINE

## 2018-09-18 PROCEDURE — 90715 TDAP VACCINE 7 YRS/> IM: CPT | Performed by: FAMILY MEDICINE

## 2018-09-18 RX ORDER — NICOTINE 21 MG/24HR
1 PATCH, TRANSDERMAL 24 HOURS TRANSDERMAL EVERY 24 HOURS
Qty: 30 PATCH | Refills: 1 | Status: SHIPPED | OUTPATIENT
Start: 2018-09-18 | End: 2019-04-16

## 2018-09-18 RX ORDER — BUPROPION HYDROCHLORIDE 150 MG/1
TABLET, EXTENDED RELEASE ORAL
Qty: 60 TABLET | Refills: 2 | Status: SHIPPED | OUTPATIENT
Start: 2018-09-18 | End: 2018-10-16

## 2018-09-18 ASSESSMENT — PAIN SCALES - GENERAL: PAINLEVEL: NO PAIN (0)

## 2018-09-18 NOTE — PATIENT INSTRUCTIONS
Get the TDAP shot today  Get the labs today  Start on ZYBAN for smoking  Hold omega 3 fish oil for atleast 5-7 days before the procedure      Before Your Surgery      Call your surgeon if there is any change in your health. This includes signs of a cold or flu (such as a sore throat, runny nose, cough, rash or fever).    Do not smoke, drink alcohol or take over the counter medicine (unless your surgeon or primary care doctor tells you to) for the 24 hours before and after surgery.    If you take prescribed drugs: Follow your doctor s orders about which medicines to take and which to stop until after surgery.    Eating and drinking prior to surgery: follow the instructions from your surgeon    Take a shower or bath the night before surgery. Use the soap your surgeon gave you to gently clean your skin. If you do not have soap from your surgeon, use your regular soap. Do not shave or scrub the surgery site.  Wear clean pajamas and have clean sheets on your bed.

## 2018-09-18 NOTE — NURSING NOTE
Screening Questionnaire for Adult Immunization    Are you sick today?   No   Do you have allergies to medications, food, a vaccine component or latex?   No   Have you ever had a serious reaction after receiving a vaccination?   No   Do you have a long-term health problem with heart disease, lung disease, asthma, kidney disease, metabolic disease (e.g. diabetes), anemia, or other blood disorder?   No   Do you have cancer, leukemia, HIV/AIDS, or any other immune system problem?   No   In the past 3 months, have you taken medications that affect  your immune system, such as prednisone, other steroids, or anticancer drugs; drugs for the treatment of rheumatoid arthritis, Crohn s disease, or psoriasis; or have you had radiation treatments?   No   Have you had a seizure, or a brain or other nervous system problem?   No   During the past year, have you received a transfusion of blood or blood     products, or been given immune (gamma) globulin or antiviral drug?   No   For women: Are you pregnant or is there a chance you could become        pregnant during the next month?   No   Have you received any vaccinations in the past 4 weeks?   No     Immunization questionnaire answers were all negative.      MNVFC doesn't apply on this patient           Screening performed by Sarah López

## 2018-09-18 NOTE — MR AVS SNAPSHOT
After Visit Summary   9/18/2018    Colletta Dwen Sorrell    MRN: [id number]           Patient Information     Date Of Birth          1960        Visit Information        Provider Department      9/18/2018 1:30 PM Radha Jaffe MD [facility]        Today's Diagnoses     Preop general physical exam    -  1    Cubital tunnel syndrome, left        Tobacco use disorder        Need for Tdap vaccination        Atypical chest pain          Care Instructions    Get the TDAP shot today  Get the labs today  Start on ZYBAN for smoking  Hold omega 3 fish oil for atleast 5-7 days before the procedure      Before Your Surgery      Call your surgeon if there is any change in your health. This includes signs of a cold or flu (such as a sore throat, runny nose, cough, rash or fever).    Do not smoke, drink alcohol or take over the counter medicine (unless your surgeon or primary care doctor tells you to) for the 24 hours before and after surgery.    If you take prescribed drugs: Follow your doctor s orders about which medicines to take and which to stop until after surgery.    Eating and drinking prior to surgery: follow the instructions from your surgeon    Take a shower or bath the night before surgery. Use the soap your surgeon gave you to gently clean your skin. If you do not have soap from your surgeon, use your regular soap. Do not shave or scrub the surgery site.  Wear clean pajamas and have clean sheets on your bed.           Follow-ups after your visit        Your next 10 appointments already scheduled     Oct 02, 2018 10:30 AM CDT   Level 5 with [address] ([facility])    [id number] [place] [address] [phone]            Oct 18, 2018   Procedure with Mitch Bowman MD   [facility] ([facility])    [address]  5th Floor  [facility]  [address]   [phone]           Located in the Clinics and Surgery Center at [address] [place] [address].   parking is very convenient and highly recommended.  is a $6 flat rate fee.  Both  and self parkers should enter the main arrival plaza from [facility]; parking attendants will direct you based on your parking preference.            Oct 31, 2018  2:15 PM CDT   (Arrive by 2:00 PM)   RETURN HAND with Mitch Bowman MD   [facility] ([facility])    [address] [place] [address]  4th [facility] [address]   [phone]            Nov 28, 2018  2:15 PM CST   (Arrive by 2:00 PM)   RETURN HAND with Mitch Bowman MD   [facility] ([facility])    [address] [place] [address]   [phone]            Apr 09, 2019 10:00 AM CDT   (Arrive by 9:45 AM)   Return Multiple Sclerosis with Barrera Shannon MD   [facility] ([facility])    [address] [place] [address]  [address] [place] [address]   [phone]              Who to contact     If you have questions or need follow up information about today's clinic visit or your schedule please contact [facility] directly at [phone].  Normal or non-critical lab and imaging results will be communicated to you by MyChart, letter or phone within 4 business days after the clinic has received the results. If you do not hear from us within 7 days, please contact the clinic through MyChart or phone. If you have a critical or abnormal lab result, we will notify you by phone as soon as possible.  Submit refill requests through [employer] or call your pharmacy and they will forward the refill request to us. Please allow 3 business days for your refill to be completed.          Additional Information About Your Visit        Care EveryWhere ID     This is your Care  EveryWhere ID. This could be used by other organizations to access your [place] medical records  [id number]        Your Vitals Were     Pulse Temperature Pulse Oximetry BMI (Body Mass Index)          64 98.4  F (36.9  C) (Oral) 99% 24.17 kg/m2         Blood Pressure from Last 3 Encounters:   09/18/18 114/78   09/04/18 103/70   07/05/18 108/56    Weight from Last 3 Encounters:   09/18/18 140 lb 12.8 oz (63.9 kg)   09/04/18 141 lb 6.4 oz (64.1 kg)   08/29/18 140 lb (63.5 kg)              We Performed the Following     CBC with platelets and differential     TDAP VACCINE (ADACEL)          Today's Medication Changes          These changes are accurate as of 9/18/18  2:12 PM.  If you have any questions, ask your nurse or doctor.               Start taking these medicines.        Dose/Directions    buPROPion 150 MG 12 hr tablet   Commonly known as:  WELLBUTRIN SR   Used for:  Tobacco use disorder   Started by:  Radha Jaffe MD        Take 1 tablet once daily and increase to 1 tablet twice daily after 4 to 7 days   Quantity:  60 tablet   Refills:  2       nicotine 14 MG/24HR 24 hr patch   Commonly known as:  NICODERM CQ   Used for:  Tobacco use disorder   Started by:  Radha Jaffe MD        Dose:  1 patch   Place 1 patch onto the skin every 24 hours   Quantity:  30 patch   Refills:  1         These medicines have changed or have updated prescriptions.        Dose/Directions    Naltrexone Powd   This may have changed:  how much to take   Used for:  Multiple sclerosis (H)        Dose:  4 mg   Take 4 mg by mouth At Bedtime   Quantity:  1 Bottle   Refills:  3            Where to get your medicines      [place] [facility] [place] [address]  [address], [place] [address]     Phone:  [phone]     buPROPion 150 MG 12 hr tablet    nicotine 14 MG/24HR 24 hr patch                Primary Care Provider Office Phone # Fax #    Amrita BRITTNEY Tracey  [phone]       [facility] [address]  [facility] [address]        Equal Access to Services     LEIDY SHUKLA : Hadii aad ku hadpagejose miguel Bell, waquangda javiericha, qasandrata kaalmada chaz, eboni kristyn ezequielaquilino jonesbasilio papaismaelazul ramirez. So [facility] [phone].    ATENCIÓN: Si habla español, tiene a vazquez disposición servicios gratuitos de asistencia lingüística. Llame al [phone].    We comply with applicable federal civil rights laws and [place] laws. We do not discriminate on the basis of race, color, national origin, age, disability, sex, sexual orientation, or gender identity.            Thank you!     Thank you for choosing [facility]  for your care. Our goal is always to provide you with excellent care. Hearing back from our patients is one way we can continue to improve our services. Please take a few minutes to complete the written survey that you may receive in the mail after your visit with us. Thank you!             Your Updated Medication List - Protect others around you: Learn how to safely use, store and throw away your medicines at www.disposemymeds.org.          This list is accurate as of 9/18/18  2:12 PM.  Always use your most recent med list.                   Brand Name Dispense Instructions for use Diagnosis    ALPHA LIPOIC ACID PO      Take 300 mg by mouth daily        AMPYRA 10 MG Tb12   Generic drug:  Dalfampridine      Take 1 tablet by mouth 2 times daily        buPROPion 150 MG 12 hr tablet    WELLBUTRIN SR    60 tablet    Take 1 tablet once daily and increase to 1 tablet twice daily after 4 to 7 days    Tobacco use disorder       CALCIUM-VITAMIN D PO      Take 1 tablet by mouth daily        cholecalciferol 1000 UNIT tablet    vitamin D3     Take 1 tablet by mouth daily.    Multiple sclerosis (H)       DAILY PROBIOTIC PO      Take 1 capsule by mouth daily        fish Oil 1200 MG capsule      Take 1,200 mg by mouth daily        Glatiramer Acetate  40 MG/ML Sosy      Inject 40 mg Subcutaneous    Multiple sclerosis (H)       MAGNESIUM OXIDE PO      Take 500 mg by mouth daily        melatonin 3 MG tablet      Take 3 mg by mouth nightly as needed    Multiple sclerosis (H)       MULTIVITAMIN ADULT PO      Take 1 tablet by mouth daily    Multiple sclerosis (H)       Naltrexone Powd     1 Bottle    Take 4 mg by mouth At Bedtime    Multiple sclerosis (H)       nicotine 14 MG/24HR 24 hr patch    NICODERM CQ    30 patch    Place 1 patch onto the skin every 24 hours    Tobacco use disorder       oxybutynin 10 MG 24 hr tablet    DITROPAN-XL     Take 1 tablet by mouth daily        VITAMIN B 12 PO      Take 2,500 mcg by mouth daily        VITAMIN B COMPLEX PO      Take 1 tablet by mouth as needed    Multiple sclerosis (H)       VITAMIN C PO      Take 1.5 teaspoonful by mouth daily

## 2018-09-18 NOTE — PROGRESS NOTES
[address] [address]  [phone]  Dept: [phone]    PRE-OP EVALUATION:  Today's date: 2018      Colletta Dwen Sorrell (: 1960) presents for pre-operative evaluation assessment as requested by Dr. Mitch Bowman.  She requires evaluation and anesthesia risk assessment prior to undergoing surgery/procedure for treatment of Cubital tunnel syndrome on left.    Proposed Surgery/ Procedure: Left Cubital Tunnel Release, Possible Ulnar Nerve Transposition  Date of Surgery/ Procedure: 10/18/18  Time of Surgery/ Procedure: 7:15am  Hospital/Surgical Facility: [facility]  Fax number for surgical facility: Available electronically, no need to fax  Primary Physician: Amrita Tracey  Type of Anesthesia Anticipated: General    Patient has a Health Care Directive or Living Will:  NO    1. NO - Do you have a history of heart attack, stroke, stent, bypass or surgery on an artery in the head, neck, heart or legs?  2. YES - DO YOU EVER HAVE ANY PAIN OR DISCOMFORT IN YOUR CHEST? Had chest pain, seen by cardiology and ruled out,no concerns at thsi time  3. NO - Do you have a history of  Heart Failure?  4. YES - ARE YOUR TROUBLED BY SHORTNESS OF BREATH WHEN WALKING ON THE LEVEL, UP A SLIGHT HILL OR AT NIGHT? Not now  5. NO - Do you currently have a cold, bronchitis or other respiratory infection?  6. NO - Do you have a cough, shortness of breath or wheezing?  7. NO - Do you sometimes get pains in the calves of your legs when you walk?  8. YES - DO YOU OR ANYONE IN YOUR FAMILY HAVE PREVIOUS HISTORY OF BLOOD CLOTS?   Unclear of family history, but thinks sister is taking anti coagulation  9. NO - Do you or does anyone in your family have a serious bleeding problem such as prolonged bleeding following surgeries or cuts?  10. YES - HAVE YOU EVER HAD PROBLEMS WITH ANEMIA OR BEEN TOLD TO TAKE IRON PILLS? Had history, not currently  11. NO - Have you had any abnormal blood  loss such as black, tarry or bloody stools, or abnormal vaginal bleeding?  12. NO - Have you ever had a blood transfusion?  13. NO - Have you or any of your relatives ever had problems with anesthesia?  14. no- DO YOU HAVE SLEEP APNEA, EXCESSIVE SNORING OR DAYTIME DROWSINESS? no  15. NO - Do you have any prosthetic heart valves?  16. NO - Do you have prosthetic joints?  17. NO - Is there any chance that you may be pregnant?      HPI:     HPI related to upcoming procedure:   Patient is new to the provider, is here for preoperative clearance for upcoming left cubital tunnel decompression for left arm pain and paresthesias and left cubital tunnel syndrome  Past medical history is significant for multiple sclerosis, multiple thyroid nodules and ongoing tobacco use.  Patient has been smoking half to 1 pack per day for the past 20+ years  Has tried nicotine gums and lozenges in the past with no success.    Patient is interested in discussing about options with the provider today.  Patient switched her care from [facility] to the [facility] [facility] 4 years ago, but has not seen primary care yet until now          MEDICAL HISTORY:     Patient Active Problem List    Diagnosis Date Noted     Cubital tunnel syndrome, left 09/18/2018     Priority: Medium     Multiple sclerosis (H) 07/11/2017     Priority: Medium     Ankle swelling, left 06/19/2017     Priority: Medium     Fatigue 09/25/2014     Priority: Medium     Multinodular thyroid 09/25/2014     Priority: Medium     Meralgia paresthetica 05/28/2013     Priority: Medium     Neutropenia (H) 02/08/2013     Priority: Medium     Neuropathy 01/07/2013     Priority: Medium     Anxiety 01/06/2011     Priority: Medium     Cigarette smoker 12/09/2010     Priority: Medium      History reviewed. No pertinent past medical history.  History reviewed. No pertinent surgical history.  Current Outpatient Prescriptions   Medication Sig Dispense Refill     ALPHA LIPOIC  ACID PO Take 300 mg by mouth daily       AMPYRA 10 MG TB12 Take 1 tablet by mouth 2 times daily        Ascorbic Acid (VITAMIN C PO) Take 1.5 teaspoonful by mouth daily        B Complex Vitamins (VITAMIN B COMPLEX PO) Take 1 tablet by mouth as needed        buPROPion (WELLBUTRIN SR) 150 MG 12 hr tablet Take 1 tablet once daily and increase to 1 tablet twice daily after 4 to 7 days 60 tablet 2     CALCIUM-VITAMIN D PO Take 1 tablet by mouth daily       cholecalciferol (VITAMIN D) 1000 UNIT tablet Take 1 tablet by mouth daily.       Cyanocobalamin (VITAMIN B 12 PO) Take 2,500 mcg by mouth daily       Glatiramer Acetate 40 MG/ML SOSY Inject 40 mg Subcutaneous       MAGNESIUM OXIDE PO Take 500 mg by mouth daily        melatonin 3 MG tablet Take 3 mg by mouth nightly as needed        Multiple Vitamins-Minerals (MULTIVITAMIN ADULT PO) Take 1 tablet by mouth daily        Naltrexone POWD Take 4 mg by mouth At Bedtime (Patient taking differently: Take 4.5 mg by mouth At Bedtime ) 1 Bottle 3     nicotine (NICODERM CQ) 14 MG/24HR 24 hr patch Place 1 patch onto the skin every 24 hours 30 patch 1     Omega-3 Fatty Acids (FISH OIL) 1200 MG capsule Take 1,200 mg by mouth daily       oxybutynin (DITROPAN-XL) 10 MG 24 hr tablet Take 1 tablet by mouth daily       Probiotic Product (DAILY PROBIOTIC PO) Take 1 capsule by mouth daily       OTC products: None, except as noted above    Allergies   Allergen Reactions     Compazine [Prochlorperazine] Swelling     Swelling of tongue about 25 years ago      Latex Allergy: NO    Social History   Substance Use Topics     Smoking status: Former Smoker     Types: Cigarettes     Quit date: 1/9/2017     Smokeless tobacco: Never Used     Alcohol use 0.6 oz/week     1 Glasses of wine per week      Comment: EVERY 2 WEEKS     History   Drug Use No       REVIEW OF SYSTEMS:   CONSTITUTIONAL: NEGATIVE for fever, chills, change in weight  INTEGUMENTARY/SKIN: NEGATIVE for worrisome rashes, moles or  lesions  EYES: NEGATIVE for vision changes or irritation  ENT/MOUTH: NEGATIVE for ear, mouth and throat problems  RESP: NEGATIVE for significant cough or SOB  RESP: History of smoking  BREAST: NEGATIVE for masses, tenderness or discharge  CV: NEGATIVE for chest pain, palpitations or peripheral edema  GI: NEGATIVE for nausea, abdominal pain, heartburn, or change in bowel habits  : NEGATIVE for frequency, dysuria, or hematuria  MUSCULOSKELETAL: Left arm pain from cubital tunnel syndrome  NEURO: History of multiple sclerosis and paresthesias of left arm from cubital tunnel syndrome  ENDOCRINE: NEGATIVE for temperature intolerance, skin/hair changes  ENDOCRINE: History of multiple thyroid nodules  HEME: NEGATIVE for bleeding problems  PSYCHIATRIC: NEGATIVE for changes in mood or affect    EXAM:   /78 (BP Location: Right arm, Patient Position: Sitting, Cuff Size: Adult Regular)  Pulse 64  Temp 98.4  F (36.9  C) (Oral)  Wt 140 lb 12.8 oz (63.9 kg)  SpO2 99%  BMI 24.17 kg/m2    GENERAL APPEARANCE: healthy, alert and no distress     EYES: EOMI, PERRL     HENT: ear canals and TM's normal and nose and mouth without ulcers or lesions     NECK: no adenopathy, no asymmetry, masses, or scars and thyroid normal to palpation     RESP: lungs clear to auscultation - no rales, rhonchi or wheezes     CV: regular rates and rhythm, normal S1 S2, no S3 or S4 and no murmur, click or rub     ABDOMEN:  soft, nontender, no HSM or masses and bowel sounds normal     MS: extremities normal- no gross deformities noted, no evidence of inflammation in joints, FROM in all extremities.     MS: Sitting in the wheelchair     SKIN: no suspicious lesions or rashes     NEURO: Normal strength and tone, sensory exam grossly normal, mentation intact and speech normal     PSYCH: mentation appears normal. and affect normal/bright     LYMPHATICS: No cervical adenopathy    DIAGNOSTICS:   EKG: From June 2018 was reviewed with patient-normal Sinus  Rhythm, normal axis, normal intervals, no acute ST/T changes c/w ischemia, no LVH by voltage criteria,   CBC- ordered today    Recent Labs   Lab Test  07/11/17   1041   HGB  13.6   PLT  242   NA  138   POTASSIUM  4.0   CR  0.62        IMPRESSION:   Reason for surgery/procedure: Left cubital tunnel syndrome/cubital tunnel release  Diagnosis/reason for consult: Preoperative clearance    ASSESSMENT / PLAN:  (Z01.818) Preop general physical exam  (primary encounter diagnosis)  Comment:   Plan: CBC with platelets and differential        Patient is cleared for the procedure, recommended to hold omega-3 fish oil for at least 1 week before the procedure    (G56.22) Cubital tunnel syndrome, left  Comment:   Plan: CBC with platelets and differential        as above    (F17.200) Tobacco use disorder  Comment:   Plan: buPROPion (WELLBUTRIN SR) 150 MG 12 hr tablet,         nicotine (NICODERM CQ) 14 MG/24HR 24 hr patch        Emphasized on quitting smoking, patient is interested in discussing the options.  Will start on Zyban, patient is also asking for prescription for nicotine patches in case if she does not feel Zyban was helping  Recommended to choose a quit date and start the Zyban 2 weeks before the quit date  Dosing and potential medication side effects discussed.  Patient verbalised understanding and is agreeable to the plan.      (Z23) Need for Tdap vaccination  Comment:   Plan: TDAP VACCINE (ADACEL)            (R07.89) Atypical chest pain  Comment:   Plan: Patient was having intermittent acute chest pain for 3 months, had an evaluation with Dr. Brown in cardiology 3 months ago, had normal stress test and echo  Will continue to monitor for now    (E04.2) Multinodular thyroid  Comment:   Plan: Patient is not aware of this diagnosis,  Last thyroid ultrasound was in 2013 along the biopsy  Will get a repeat screening test done at her physical appointment in 3-4 months       Multiple sclerosis (H)  Comment:   Plan: Continue  current neurology follow-up.            The proposed surgical procedure is considered INTERMEDIATE risk.    REVISED CARDIAC RISK INDEX  The patient has the following serious cardiovascular risks for perioperative complications such as (MI, PE, VFib and 3  AV Block):  No serious cardiac risks  INTERPRETATION: 0 risks: Class I (very low risk - 0.4% complication rate)    The patient has the following additional risks for perioperative complications:  No identified additional risks      ICD-10-CM    1. Preop general physical exam Z01.818 CBC with platelets and differential   2. Cubital tunnel syndrome, left G56.22 CBC with platelets and differential   3. Tobacco use disorder F17.200 buPROPion (WELLBUTRIN SR) 150 MG 12 hr tablet     nicotine (NICODERM CQ) 14 MG/24HR 24 hr patch   4. Need for Tdap vaccination Z23 TDAP VACCINE (ADACEL)   5. Atypical chest pain R07.89    6. Multinodular thyroid E04.2        RECOMMENDATIONS:         --Patient is to take all scheduled medications on the day of surgery EXCEPT for modifications listed below.    APPROVAL GIVEN to proceed with proposed procedure, without further diagnostic evaluation       Signed Electronically by: Radha Jaffe MD    Copy of this evaluation report is provided to requesting physician.    [facility] Preop Guidelines    Revised Cardiac Risk Index  Chart documentation done in part with Dragon Voice recognition Software. Although reviewed after completion, some word and grammatical error may remain.

## 2018-09-21 ENCOUNTER — TELEPHONE (OUTPATIENT)
Dept: ORTHOPEDICS | Facility: CLINIC | Age: 58
End: 2018-09-21

## 2018-09-21 NOTE — TELEPHONE ENCOUNTER
Patient called stating that when she went in for her pre op she didn't have time to stay to get her labs drawn and wondered if she could get them drawn early that morning, told patient she should go back to her clinic to get them done before surgery as some could take time to get back and they do want them drawn before surgery, patient understood and will go back to her clinic to get them drawn.

## 2018-09-24 ENCOUNTER — TELEPHONE (OUTPATIENT)
Dept: NEUROLOGY | Facility: CLINIC | Age: 58
End: 2018-09-24

## 2018-09-24 DIAGNOSIS — G35 MULTIPLE SCLEROSIS (H): ICD-10-CM

## 2018-09-24 NOTE — TELEPHONE ENCOUNTER
[facility] Call Center    Phone Message    May a detailed message be left on voicemail: yes    Reason for Call: Other: Pt would like a call back regarding medication, she would like to use her insurance and needs  to place rx oder.     Action Taken: Message routed to:  [facility] ([facility] Neurology

## 2018-09-24 NOTE — TELEPHONE ENCOUNTER
I am OK with prescribing the vitamin D, the lipoic acid, the probiotic (if she is asking for that), and a multivitamin.  The other stuff is unnecessary if she is taking a multivitamin, and I am not comfortable prescribing them.

## 2018-09-24 NOTE — TELEPHONE ENCOUNTER
"I called Colletta regarding the call center message I received; She is asking if Dr. Shannon would take over her oxybutynin, which was previously written from [place]; She is also asking if Dr. Shannon would be willing to send prescriptions for \"all her vitamins\" (listed in the medication list) so that she can see if her insurance will cover; She does not have a PCP at this time who she could direct this request to, and according to Colletta, \"all of the vitamins help her MS\", so that is also why she is asking Dr. Shannon; Furthermore, if he is okay with this, she would like the prescriptions sent to [facility], that way the prescriptions can be mailed to her, since she does not have a vehicle at this time; Will route to Dr. Shannon for input.    Tata Roberts, MS RN Care Coordinator    "

## 2018-09-25 RX ORDER — ALPHA LIPOIC ACID 300 MG
300 CAPSULE ORAL DAILY
Qty: 30 CAPSULE | Refills: 11 | Status: SHIPPED | OUTPATIENT
Start: 2018-09-25 | End: 2020-01-20

## 2018-09-25 RX ORDER — ELECTROLYTES/DEXTROSE
1 SOLUTION, ORAL ORAL DAILY
Qty: 30 TABLET | Refills: 11 | Status: SHIPPED | OUTPATIENT
Start: 2018-09-25

## 2018-09-25 RX ORDER — OXYBUTYNIN CHLORIDE 10 MG/1
10 TABLET, EXTENDED RELEASE ORAL DAILY
Qty: 30 TABLET | Refills: 11 | Status: SHIPPED | OUTPATIENT
Start: 2018-09-25 | End: 2019-05-22

## 2018-09-25 RX ORDER — OMEGA-3/DHA/EPA/FISH OIL 300-1000MG
1 CAPSULE ORAL DAILY
Qty: 30 CAPSULE | Refills: 11 | Status: SHIPPED | OUTPATIENT
Start: 2018-09-25 | End: 2020-03-16

## 2018-09-25 NOTE — TELEPHONE ENCOUNTER
I talked with and clarified that he is fine with taking over the oxybutynin prescription as well; I called Colletta and let her know what Dr. Shannon is willing to prescription and which he is not; She was not particularly happy about this; She then asked if he was okay with the depends, which she did not mention to me yesterday; I verified that she would like the prescriptions sent over to [facility]; She had no further requests at this time; Dr. Shannon is okay with providing a prescription for the depends as well; Prescriptions have been sent to the pharmacy.    Tata Roberts, MS RN Care Coordinator

## 2018-09-28 RX ORDER — ACETAMINOPHEN 325 MG/1
650 TABLET ORAL ONCE
Status: CANCELLED | OUTPATIENT
Start: 2018-09-28

## 2018-09-28 RX ORDER — DIPHENHYDRAMINE HCL 25 MG
50 CAPSULE ORAL ONCE
Status: CANCELLED | OUTPATIENT
Start: 2018-09-28 | End: 2018-09-28

## 2018-09-28 RX ORDER — METHYLPREDNISOLONE SODIUM SUCCINATE 125 MG/2ML
125 INJECTION, POWDER, LYOPHILIZED, FOR SOLUTION INTRAMUSCULAR; INTRAVENOUS ONCE
Status: CANCELLED | OUTPATIENT
Start: 2018-09-28

## 2018-10-02 ENCOUNTER — INFUSION THERAPY VISIT (OUTPATIENT)
Dept: INFUSION THERAPY | Facility: CLINIC | Age: 58
End: 2018-10-02
Payer: COMMERCIAL

## 2018-10-02 VITALS
WEIGHT: 141.6 LBS | BODY MASS INDEX: 24.31 KG/M2 | OXYGEN SATURATION: 98 % | RESPIRATION RATE: 16 BRPM | DIASTOLIC BLOOD PRESSURE: 65 MMHG | TEMPERATURE: 98.6 F | HEART RATE: 68 BPM | SYSTOLIC BLOOD PRESSURE: 102 MMHG

## 2018-10-02 DIAGNOSIS — G56.22 CUBITAL TUNNEL SYNDROME, LEFT: ICD-10-CM

## 2018-10-02 DIAGNOSIS — G35 MULTIPLE SCLEROSIS (H): Primary | ICD-10-CM

## 2018-10-02 DIAGNOSIS — Z01.818 PREOP GENERAL PHYSICAL EXAM: ICD-10-CM

## 2018-10-02 LAB
BASOPHILS # BLD AUTO: 0 10E9/L (ref 0–0.2)
BASOPHILS NFR BLD AUTO: 0.2 %
DIFFERENTIAL METHOD BLD: NORMAL
EOSINOPHIL # BLD AUTO: 0.1 10E9/L (ref 0–0.7)
EOSINOPHIL NFR BLD AUTO: 1.7 %
ERYTHROCYTE [DISTWIDTH] IN BLOOD BY AUTOMATED COUNT: 14.6 % (ref 10–15)
HCT VFR BLD AUTO: 39 % (ref 35–47)
HGB BLD-MCNC: 12.4 G/DL (ref 11.7–15.7)
IMM GRANULOCYTES # BLD: 0 10E9/L (ref 0–0.4)
IMM GRANULOCYTES NFR BLD: 0 %
LYMPHOCYTES # BLD AUTO: 1.8 10E9/L (ref 0.8–5.3)
LYMPHOCYTES NFR BLD AUTO: 42.6 %
MCH RBC QN AUTO: 27.7 PG (ref 26.5–33)
MCHC RBC AUTO-ENTMCNC: 31.8 G/DL (ref 31.5–36.5)
MCV RBC AUTO: 87 FL (ref 78–100)
MONOCYTES # BLD AUTO: 0.3 10E9/L (ref 0–1.3)
MONOCYTES NFR BLD AUTO: 7.7 %
NEUTROPHILS # BLD AUTO: 2 10E9/L (ref 1.6–8.3)
NEUTROPHILS NFR BLD AUTO: 47.8 %
PLATELET # BLD AUTO: 243 10E9/L (ref 150–450)
RBC # BLD AUTO: 4.47 10E12/L (ref 3.8–5.2)
WBC # BLD AUTO: 4.2 10E9/L (ref 4–11)

## 2018-10-02 PROCEDURE — 96413 CHEMO IV INFUSION 1 HR: CPT | Performed by: INTERNAL MEDICINE

## 2018-10-02 PROCEDURE — 96375 TX/PRO/DX INJ NEW DRUG ADDON: CPT | Performed by: INTERNAL MEDICINE

## 2018-10-02 PROCEDURE — 85025 COMPLETE CBC W/AUTO DIFF WBC: CPT | Performed by: FAMILY MEDICINE

## 2018-10-02 PROCEDURE — 36415 COLL VENOUS BLD VENIPUNCTURE: CPT | Performed by: FAMILY MEDICINE

## 2018-10-02 PROCEDURE — 96415 CHEMO IV INFUSION ADDL HR: CPT | Performed by: INTERNAL MEDICINE

## 2018-10-02 PROCEDURE — 90686 IIV4 VACC NO PRSV 0.5 ML IM: CPT | Performed by: INTERNAL MEDICINE

## 2018-10-02 PROCEDURE — 90471 IMMUNIZATION ADMIN: CPT | Performed by: INTERNAL MEDICINE

## 2018-10-02 PROCEDURE — 99207 ZZC NO CHARGE LOS: CPT

## 2018-10-02 RX ORDER — DIPHENHYDRAMINE HCL 25 MG
50 CAPSULE ORAL ONCE
Status: COMPLETED | OUTPATIENT
Start: 2018-10-02 | End: 2018-10-02

## 2018-10-02 RX ORDER — DIPHENHYDRAMINE HCL 25 MG
50 CAPSULE ORAL ONCE
Status: CANCELLED | OUTPATIENT
Start: 2018-10-02 | End: 2018-10-02

## 2018-10-02 RX ORDER — ACETAMINOPHEN 325 MG/1
650 TABLET ORAL ONCE
Status: COMPLETED | OUTPATIENT
Start: 2018-10-02 | End: 2018-10-02

## 2018-10-02 RX ORDER — ACETAMINOPHEN 325 MG/1
650 TABLET ORAL ONCE
Status: CANCELLED | OUTPATIENT
Start: 2018-10-02

## 2018-10-02 RX ORDER — METHYLPREDNISOLONE SODIUM SUCCINATE 125 MG/2ML
125 INJECTION, POWDER, LYOPHILIZED, FOR SOLUTION INTRAMUSCULAR; INTRAVENOUS ONCE
Status: CANCELLED | OUTPATIENT
Start: 2018-10-02

## 2018-10-02 RX ORDER — METHYLPREDNISOLONE SODIUM SUCCINATE 125 MG/2ML
125 INJECTION, POWDER, LYOPHILIZED, FOR SOLUTION INTRAMUSCULAR; INTRAVENOUS ONCE
Status: COMPLETED | OUTPATIENT
Start: 2018-10-02 | End: 2018-10-02

## 2018-10-02 RX ADMIN — METHYLPREDNISOLONE SODIUM SUCCINATE 125 MG: 125 INJECTION INTRAMUSCULAR; INTRAVENOUS at 11:30

## 2018-10-02 RX ADMIN — ACETAMINOPHEN 650 MG: 325 TABLET ORAL at 11:09

## 2018-10-02 RX ADMIN — Medication 250 ML: at 11:30

## 2018-10-02 RX ADMIN — Medication 50 MG: at 11:09

## 2018-10-02 ASSESSMENT — PAIN SCALES - GENERAL: PAINLEVEL: NO PAIN (0)

## 2018-10-02 NOTE — MR AVS SNAPSHOT
After Visit Summary   10/2/2018    Colletta Dwen Sorrell    MRN: [id number]           Patient Information     Date Of Birth          1960        Visit Information        Provider Department      10/2/2018 10:30 AM [address]        Today's Diagnoses     Multiple sclerosis (H)    -  1       Follow-ups after your visit        Your next 10 appointments already scheduled     Oct 18, 2018   Procedure with Mitch Bowman MD   [facility] ([facility])    [address] [place] [address]  5th [facility] [id number] [phone]           Located in the Clinics and Surgery Center at [address] [place] [address].   parking is very convenient and highly recommended.  is a $6 flat rate fee.  Both  and self parkers should enter the main arrival plaza from [facility]; parking attendants will direct you based on your parking preference.            Oct 31, 2018  2:15 PM CDT   (Arrive by 2:00 PM)   RETURN HAND with Mitch Bowman MD   [facility] ([facility])    [address] [place] [address]  4th [facility] [id number] [phone]            Nov 28, 2018  2:15 PM CST   (Arrive by 2:00 PM)   RETURN HAND with Mitch Bowman MD   [facility] ([facility])    [address] [place] [id number] [phone]            Apr 09, 2019 10:00 AM CDT   (Arrive by 9:45 AM)   Return Multiple Sclerosis with Barrera Shannon MD   [facility] Multiple Sclerosis ([facility])    [address] [place] [id number]   [phone]              Future tests that were ordered for you today     Open Standing Orders        Priority Remaining Interval Expires Ordered    Notify Physician Routine [id number] PRN  10/2/2018            Who to contact     If  you have questions or need follow up information about today's clinic visit or your schedule please contact [facility] directly at [phone].  Normal or non-critical lab and imaging results will be communicated to you by MyChart, letter or phone within 4 business days after the clinic has received the results. If you do not hear from us within 7 days, please contact the clinic through MyChart or phone. If you have a critical or abnormal lab result, we will notify you by phone as soon as possible.  Submit refill requests through [employer] or call your pharmacy and they will forward the refill request to us. Please allow 3 business days for your refill to be completed.          Additional Information About Your Visit        Care EveryWhere ID     This is your Care EveryWhere ID. This could be used by other organizations to access your [place] medical records  [id number]        Your Vitals Were     Pulse Temperature Respirations Pulse Oximetry BMI (Body Mass Index)       68 98.6  F (37  C) (Oral) 16 98% 24.31 kg/m2        Blood Pressure from Last 3 Encounters:   10/02/18 102/65   09/18/18 114/78   09/04/18 103/70    Weight from Last 3 Encounters:   10/02/18 64.2 kg (141 lb 9.6 oz)   09/18/18 63.9 kg (140 lb 12.8 oz)   09/04/18 64.1 kg (141 lb 6.4 oz)              Today, you had the following     No orders found for display         Today's Medication Changes          These changes are accurate as of 10/2/18  3:59 PM.  If you have any questions, ask your nurse or doctor.               These medicines have changed or have updated prescriptions.        Dose/Directions    Naltrexone Powd   This may have changed:  how much to take   Used for:  Multiple sclerosis (H)        Dose:  4 mg   Take 4 mg by mouth At Bedtime   Quantity:  1 Bottle   Refills:  3                Primary Care Provider Office Phone # Fax #    Amritazoie Tracey [phone]       [facility] [address]  [place] [address]        Equal Access to Services     [facility] VAZQUEZ : Hadii aad ku hadpagejose miguel Bell, waaxda luqadaha, qaybta penniematravis ware, eboni elamismaelazul ramirez. [facility] [phone].    ATENCIÓN: Si habla español, tiene a vazquez disposición servicios gratuitos de asistencia lingüística. Llame al [phone].    We comply with applicable federal civil rights laws and [place] laws. We do not discriminate on the basis of race, color, national origin, age, disability, sex, sexual orientation, or gender identity.            Thank you!     Thank you for choosing [facility]  for your care. Our goal is always to provide you with excellent care. Hearing back from our patients is one way we can continue to improve our services. Please take a few minutes to complete the written survey that you may receive in the mail after your visit with us. Thank you!             Your Updated Medication List - Protect others around you: Learn how to safely use, store and throw away your medicines at www.disposemymeds.org.          This list is accurate as of 10/2/18  3:59 PM.  Always use your most recent med list.                   Brand Name Dispense Instructions for use Diagnosis    * ALPHA LIPOIC ACID PO      Take 300 mg by mouth daily        * Alpha-Lipoic Acid 300 MG Caps     30 capsule    Take 300 mg by mouth daily    Multiple sclerosis (H)       AMPYRA 10 MG Tb12   Generic drug:  Dalfampridine      Take 1 tablet by mouth 2 times daily        buPROPion 150 MG 12 hr tablet    WELLBUTRIN SR    60 tablet    Take 1 tablet once daily and increase to 1 tablet twice daily after 4 to 7 days    Tobacco use disorder       CALCIUM-VITAMIN D PO      Take 1 tablet by mouth daily        cholecalciferol 5000 units Caps capsule    vitamin D3    30 capsule    Take 1 capsule (5,000 Units) by mouth daily    Multiple sclerosis (H)       DAILY PROBIOTIC Caps     30 capsule    Take 1 capsule by mouth daily     Multiple sclerosis (H)       fish Oil 1200 MG capsule      Take 1,200 mg by mouth daily        Glatiramer Acetate 40 MG/ML Sosy      Inject 40 mg Subcutaneous    Multiple sclerosis (H)       MAGNESIUM OXIDE PO      Take 500 mg by mouth daily        melatonin 3 MG tablet      Take 3 mg by mouth nightly as needed    Multiple sclerosis (H)       MULTIVITAMIN ADULT Tabs     30 tablet    Take 1 tablet by mouth daily    Multiple sclerosis (H)       Naltrexone Powd     1 Bottle    Take 4 mg by mouth At Bedtime    Multiple sclerosis (H)       nicotine 14 MG/24HR 24 hr patch    NICODERM CQ    30 patch    Place 1 patch onto the skin every 24 hours    Tobacco use disorder       oxybutynin 10 MG 24 hr tablet    DITROPAN-XL    30 tablet    Take 1 tablet (10 mg) by mouth daily    Multiple sclerosis (H)       VITAMIN B 12 PO      Take 2,500 mcg by mouth daily        VITAMIN B COMPLEX PO      Take 1 tablet by mouth as needed    Multiple sclerosis (H)       VITAMIN C PO      Take 1.5 teaspoonful by mouth daily        * Notice:  This list has 2 medication(s) that are the same as other medications prescribed for you. Read the directions carefully, and ask your doctor or other care provider to review them with you.

## 2018-10-02 NOTE — PROGRESS NOTES
Infusion Nursing Note:  Colletta Dwen Sorrell presents today for Ocrevus and Flu Shot.    Patient seen by provider today: No   present during visit today: Not Applicable.    Note: Start rate of 40 ml/hour. Increased by 40 ml/hour, every 30 minutes to a max rate of 200 ml/hr.  40 ml/hr x 30 minutes  80 ml/hr x 30 minutes  120 ml/hr x 30 minutes  160 ml/hr x 30 minutes  200 ml/hr x 90 minutes    Intravenous Access:  Peripheral IV placed.    Treatment Conditions:  Ocrevus Infusion Checklist:    ~~~ NOTE: If the patient answers yes to any of the questions below, hold the infusion and contact ordering provider or on-call provider.    1. Have you recently had an elevated temperature, fever, chills, productive cough, coughing for 3 weeks or longer or hemoptysis,  abnormal vital signs, night sweats,  chest pain or have you noticed a decrease in your appetite, unexplained weight loss or fatigue? No  2. Do you have any open wounds or new incisions? No  3. Do you have any recent or upcoming hospitalizations, surgeries or dental procedures? Yes, left elbow nerve surgery.  Paged Dr Shannon whom states okay to proceed with today's Ocrevus infusion with known upcoming surgery on 10/18/18.  4. Do you currently have or recently have had any signs of illness or infection or are you on any antibiotics? No  5. Have you had any new, sudden or worsening abdominal pain? No  6. Have you or anyone in your household received a live vaccination in the past 4 weeks? Please note:  No live vaccines while on biologic/chemotherapy until 6 months after the last treatment.  Patient can receive the flu vaccine (shot only) and the pneumovax.  It is optimal for the patient to get these vaccines mid cycle, but they can be given at any time as long as it is not on the day of the infusion. No  7. Have you recently been diagnosed with any new nervous system diseases (ie. Multiple sclerosis, Guillain [place], seizures, neurological changes) or  "cancer diagnosis? Are you on any form of radiation or chemotherapy? No Not recent diagnosis of MS  8. Are you pregnant or breast feeding or do you have plans of pregnancy in the future? No  9. Have there been any other new onset medical symptoms? No      Injectable Influenza Immunization Documentation    1.  Has the patient received the information for the injectable influenza vaccine? YES     2. Is the patient 6 months of age or older? NO     3. Does the patient have any of the following contraindications?         Severe allergy to eggs?  No     Severe allergic reaction to previous influenza vaccines?  No   Severe allergy to latex?  No       History of Guillain-[place] syndrome?  No     Currently have a temperature greater than 100.4F?  No        4.  Severely egg allergic patients should have flu vaccine eligibility assessed by an MD, RN, or pharmacist, and those who received flu vaccine should be observed for 15 min by an MD, RN, Pharmacist, Medical Technician, or member of clinic staff.\": Not applicable    5. Latex-allergic patients should be given latex-free influenza vaccine Not applicable. Please reference the Vaccine latex table to determine if your clinic s product is latex-containing.       Vaccination given by Pepper Reinoso RN      Post Infusion Assessment:  Patient tolerated infusion without incident.  Patient observed for 30 minutes post Ocrevus infusion.  Declined 60 minute wait due to transportation issues..  Blood return noted pre and post infusion.  Site patent and intact, free from redness, edema or discomfort.  No evidence of extravasations.  Access discontinued per protocol.    EDUCATION POST BIOLOGICAL/CHEMOTHERAPY INFUSION  Call the triage nurse at your clinic or seek medical attention if you have chills and/or temperature greater than or equal to 100.5, uncontrolled nausea/vomiting, diarrhea, constipation, dizziness, shortness of breath, chest pain, heart palpitations, weakness or any other new or " concerning symptoms, questions or concerns.  You can not have any live virus vaccines prior to or during treatment or up to 6 months post infusion.  If you have an upcoming surgery, medical procedure or dental procedure during treatment, this should be discussed with your ordering physician and your surgeon/dentist.  If you are having any concerning symptom, if you are unsure if you should get your next infusion or wish to speak to a provider before your next infusion, please call your care coordinator or triage nurse at your clinic to notify them so we can adequately serve you.      Discharge Plan:   Patient will call to schedule next appointment next appointment.   Patient discharged in stable condition accompanied by: mobility van.  Departure Mode: Wheelchair.    Pepper Reinoso RN

## 2018-10-02 NOTE — LETTER
October 2, 2018      Colletta Dwen Sorrell                                                                [address] [facility] [address]          Dear Colletta,    The results of your recent hemoglobin (checks for anemia), blood counts were normal.     Please make a follow-up appointment if you have additional questions.    Sincerely,     Dr. Radha Jaffe    Results for orders placed or performed in visit on 10/02/18   CBC with platelets and differential   Result Value Ref Range    WBC 4.2 4.0 - 11.0 10e9/L    RBC Count 4.47 3.8 - 5.2 10e12/L    Hemoglobin 12.4 11.7 - 15.7 g/dL    Hematocrit 39.0 35.0 - 47.0 %    MCV 87 78 - 100 fl    MCH 27.7 26.5 - 33.0 pg    MCHC 31.8 31.5 - 36.5 g/dL    RDW 14.6 10.0 - 15.0 %    Platelet Count 243 150 - 450 10e9/L    % Neutrophils 47.8 %    % Lymphocytes 42.6 %    % Monocytes 7.7 %    % Eosinophils 1.7 %    % Basophils 0.2 %    % Immature Granulocytes 0.0 %    Absolute Neutrophil 2.0 1.6 - 8.3 10e9/L    Absolute Lymphocytes 1.8 0.8 - 5.3 10e9/L    Absolute Monocytes 0.3 0.0 - 1.3 10e9/L    Absolute Eosinophils 0.1 0.0 - 0.7 10e9/L    Absolute Basophils 0.0 0.0 - 0.2 10e9/L    Abs Immature Granulocytes 0.0 0 - 0.4 10e9/L    Diff Method Automated Method

## 2018-10-03 ENCOUNTER — TELEPHONE (OUTPATIENT)
Dept: NEUROLOGY | Facility: CLINIC | Age: 58
End: 2018-10-03

## 2018-10-03 DIAGNOSIS — G35 MULTIPLE SCLEROSIS (H): Primary | ICD-10-CM

## 2018-10-03 RX ORDER — DALFAMPRIDINE 10 MG/1
1 TABLET, FILM COATED, EXTENDED RELEASE ORAL 2 TIMES DAILY
Qty: 60 TABLET | Refills: 5 | Status: SHIPPED | OUTPATIENT
Start: 2018-10-03 | End: 2019-04-16 | Stop reason: SINTOL

## 2018-10-03 RX ORDER — MAGNESIUM OXIDE 400 MG/1
500 TABLET ORAL DAILY
Qty: 7 TABLET | Status: CANCELLED | OUTPATIENT
Start: 2018-10-03

## 2018-10-03 RX ORDER — MULTIVIT WITH MINERALS/LUTEIN
TABLET ORAL DAILY
Status: CANCELLED | OUTPATIENT
Start: 2018-10-03

## 2018-10-03 RX ORDER — AMOXICILLIN 500 MG
1200 CAPSULE ORAL DAILY
Status: CANCELLED | OUTPATIENT
Start: 2018-10-03

## 2018-10-03 NOTE — TELEPHONE ENCOUNTER
Rx for depends was sent on 9/25/18.      Per Dr. Shannon on 9/24: I am OK with prescribing the vitamin D, the lipoic acid, the probiotic (if she is asking for that), and a multivitamin.  The other stuff is unnecessary if she is taking a multivitamin, and I am not comfortable prescribing them.    Patient was last seen in September and has a follow up in April with Dr. Shannon. Ampyra refilled per [facility] protocol. All other supplements/vitamin Rxs refused.

## 2018-10-03 NOTE — TELEPHONE ENCOUNTER
[facility] Call Center    Phone Message    May a detailed message be left on voicemail: no    Reason for Call: Other: Pt called in to speak with Dr. Shannon's team to clarify when she is supposed to have her infusions. She said she thinks it's every two weeks; Pt had one yesterday but she wasn't sure if it was supposed to be in two parts or not, and wants to know when her next one should be. Please call Pt.     Action Taken: Message routed to:  [facility] ([facility]): [facility] ADULT [facility]

## 2018-10-03 NOTE — TELEPHONE ENCOUNTER
Pt needs rx's for these so the items can be mailed to her.  Pt is physically unable to go to a pharmacy.    **pt also requesting rx for Depend underwear size large**    Thank you very kindly!  Anamika Stoll [facility]

## 2018-10-03 NOTE — TELEPHONE ENCOUNTER
Spoke with Colletta and clarified Ocrevus dosing schedule. She received her initial 2 doses in August 2017, 2 weeks apart. I explained that typically we dose on an every 6 month cycle thereafter but that Dr. Shannon would like to see her back in April 2019 (already scheduled), will check her Bcells and determine timing of next dose but that no further dose is needed right now. She expressed understanding; no further questions.

## 2018-10-04 NOTE — TELEPHONE ENCOUNTER
Prior Authorization Approval    Authorization Effective Date: 9/4/2018  Authorization Expiration Date: 4/2/2019  Medication: dalfampridine ER 10mg tablets   Approved Dose/Quantity: #60/ 30 days - 6 month approval to evaluate efficacy  Reference #: CMM key# [id number]   Insurance Company: [employer] - Phone [phone] Fax [phone]  Expected CoPay: $6     CoPay Card Available: No   Foundation Assistance Needed:    Which Pharmacy is filling the prescription (Not needed for infusion/clinic administered): [facility] - [place], [place] - [address]  Pharmacy Notified: Yes  Patient Notified:

## 2018-10-04 NOTE — TELEPHONE ENCOUNTER
PA Initiation    Medication: dalfampridine (Ampyra) ER 10mg tablets  Insurance Company: [employer] - Phone [phone] Fax [phone]  Pharmacy Filling the Rx: [facility] - [place], [place] - [address]  Filling Pharmacy Phone: [phone]  Filling Pharmacy Fax:    Start Date: 10/4/2018

## 2018-10-08 NOTE — TELEPHONE ENCOUNTER
I called [facility] and gave a verbal to the pharmacist for generic dalfampridine per MS protocol.    Tata Roberts, MS RN Care Coordinator

## 2018-10-08 NOTE — TELEPHONE ENCOUNTER
Per pharmacy, rx is ERNESTO for brand (?) but PA done for generic. Pt with Medicaid so doesn't qualify for free trial or copay assistance. Will ask clinic to call pharmacy to clarify.

## 2018-10-16 ENCOUNTER — TELEPHONE (OUTPATIENT)
Dept: ORTHOPEDICS | Facility: CLINIC | Age: 58
End: 2018-10-16

## 2018-10-16 NOTE — TELEPHONE ENCOUNTER
"Patient calling to cancel her surgery, stated that she went and saw chiropractor and she stated he knew what was going on and \"pressed on some nerves\" and now she is feeling better. Told patient that I would cancel her surgery and to call us if it comes back and would like to reschedule. Stated that depending on when she calls up she may need to be seen in clinic again, patient understood.  "

## 2018-10-19 RX ORDER — AMOXICILLIN 500 MG
1200 CAPSULE ORAL DAILY
OUTPATIENT
Start: 2018-10-19

## 2018-10-19 RX ORDER — CALCIUM CARBONATE/VITAMIN D3 500-10/5ML
500 LIQUID (ML) ORAL DAILY
OUTPATIENT
Start: 2018-10-19

## 2018-10-19 RX ORDER — RIBOFLAVIN (VITAMIN B2) 100 MG
1.5 TABLET ORAL DAILY
Qty: 90 TABLET | OUTPATIENT
Start: 2018-10-19

## 2018-11-28 ENCOUNTER — TELEPHONE (OUTPATIENT)
Dept: ORTHOPEDICS | Facility: CLINIC | Age: 58
End: 2018-11-28

## 2018-11-28 NOTE — TELEPHONE ENCOUNTER
Patient cancelled post operative appointment today with Dr. Bowman.   Voicemail left for patient to call back to reschedule.  Clinic phone number left on patient's voicemail.

## 2019-02-24 DIAGNOSIS — Z00.00 ROUTINE GENERAL MEDICAL EXAMINATION AT A HEALTH CARE FACILITY: Primary | ICD-10-CM

## 2019-02-24 DIAGNOSIS — Z13.6 CARDIOVASCULAR SCREENING; LDL GOAL LESS THAN 160: ICD-10-CM

## 2019-02-24 DIAGNOSIS — Z13.1 SCREENING FOR DIABETES MELLITUS (DM): ICD-10-CM

## 2019-02-27 ENCOUNTER — DOCUMENTATION ONLY (OUTPATIENT)
Dept: CARE COORDINATION | Facility: CLINIC | Age: 59
End: 2019-02-27

## 2019-04-05 DIAGNOSIS — G35 MULTIPLE SCLEROSIS (H): Primary | ICD-10-CM

## 2019-04-05 NOTE — TELEPHONE ENCOUNTER
Dr. Shannon, please see refill for incontinence supplies. Patient is scheduled to f/u with you on 4/9/19. Please approve.

## 2019-04-08 ENCOUNTER — TELEPHONE (OUTPATIENT)
Dept: NEUROLOGY | Facility: CLINIC | Age: 59
End: 2019-04-08

## 2019-04-08 NOTE — TELEPHONE ENCOUNTER
I called the patient's pharmacy and gave verbal refill per MS refill protocol for patient's LDN; I left M for the patient advising that I called this in to her pharmacy and that they stated they would start working on this refill for her.    Tata Roberts, MS RN Care Coordinator

## 2019-04-08 NOTE — TELEPHONE ENCOUNTER
Health Call Center    Phone Message    May a detailed message be left on voicemail: yes    Reason for Call: Medication Refill Request    Has the patient contacted the pharmacy for the refill? Yes   Name of medication being requested: Low-Dose Naltrexone   Provider who prescribed the medication: Dr. Shannon  [facility] [place] [place] [facility] [address]  Date medication is needed: As soon as possible as pt states that she has very limited transportation and she has a ride to go pick it up today. Please give pt a call back to discuss.     Action Taken: Message routed to:  [facility] ([facility]): Neurology

## 2019-04-09 RX ORDER — DIAPER,BRIEF,ADULT, DISPOSABLE
EACH MISCELLANEOUS
Qty: 45 EACH | Refills: 2 | Status: SHIPPED | OUTPATIENT
Start: 2019-04-09 | End: 2019-08-01

## 2019-04-16 ENCOUNTER — OFFICE VISIT (OUTPATIENT)
Dept: NEUROLOGY | Facility: CLINIC | Age: 59
End: 2019-04-16
Attending: PSYCHIATRY & NEUROLOGY
Payer: COMMERCIAL

## 2019-04-16 VITALS
HEIGHT: 64 IN | BODY MASS INDEX: 23.66 KG/M2 | WEIGHT: 138.6 LBS | HEART RATE: 76 BPM | DIASTOLIC BLOOD PRESSURE: 69 MMHG | SYSTOLIC BLOOD PRESSURE: 102 MMHG

## 2019-04-16 DIAGNOSIS — G35 MULTIPLE SCLEROSIS (H): Primary | ICD-10-CM

## 2019-04-16 PROCEDURE — G0463 HOSPITAL OUTPT CLINIC VISIT: HCPCS | Mod: ZF

## 2019-04-16 ASSESSMENT — PAIN SCALES - GENERAL: PAINLEVEL: NO PAIN (0)

## 2019-04-16 ASSESSMENT — MIFFLIN-ST. JEOR: SCORE: 1188.69

## 2019-04-16 NOTE — LETTER
4/16/2019      RE: Colletta Dwen Sorrell  [address] [facility] [address] [facility] [address]       Service Date: 04/16/2019      REASON FOR VISIT:  Colletta Sorrell is a 59-year-old woman who I follow for primary progressive multiple sclerosis.  She returns for routine followup.  I last saw her in 09/2018.      HISTORY OF PRESENT ILLNESS:  Colletta got her second round of ocrelizumab fusion on 10/02/2018.  The first infusions were in 08/2017.  She tolerated the infusion fine.  We have not been following her B-cell counts to date, but plan to start doing so now.  She has been stable from a symptomatic standpoint.  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  She has obtained a treadmill and is exercising a bit more and thinks that has helped her ambulation.  She has not had any significant new non-neurologic medical issues since I saw her last.      PHYSICAL EXAMINATION:   VITAL SIGNS:  Blood pressure 102/69.  Pulse 76.  Weight 138 pounds.   NEUROLOGIC EXAM:  She is alert and oriented.  Affect is bright and language functions are normal.  Cranial nerves are unremarkable.  Muscle bulk and tone are normal.  Strength in the right arm is normal, but shoulder abduction and finger extension are both mildly weak on the left.  Hip flexion is weak at 4/5 on the right and 3/5 on the left.  Knee flexion is weak on the left as well.  She has a spastic, ataxic, left hemiparetic gait.      IMPRESSION:  Primary progressive multiple sclerosis, clinically stable on ocrelizumab.      I spent 27 minutes with Colletta today, greater than 50% of which was spent in counseling and coordination of care.  She had called wanting to try low dose naltrexone and we discussed that today.  There is evidence of benefit in animal models of MS, but little evidence in actual patients.  She has been tolerating it well and thinks it is helping.  I told her I think it is, at worst, harmless and if she is  getting a subjective benefit it is fine to continue.  We discussed the timing of ocrelizumab doses and how I prefer to base that on B-cell repopulation, which could be monitored with a blood test.      PLAN:   1. CD19 count today.   2. I see that I have previously discussed the clinical trial of ACTH with her, but we had opted to defer that while we were starting ocrelizumab and dalfampridine.  That study is still enrolling and she would be a candidate if interested.  I will check with the  about that.   3. Return to clinic in 6 months.      FAHAD HUIZAR MD       D: 2019   T: 2019   MT: [email or web address]      Name:     SORRELL, COLLETTA   MRN:      [id number]        Account:      [id number]   :      1960           Service Date: 2019      Document: [id number]

## 2019-04-19 NOTE — PROGRESS NOTES
Service Date: 04/16/2019      REASON FOR VISIT:  Colletta Sorrell is a 59-year-old woman who I follow for primary progressive multiple sclerosis.  She returns for routine followup.  I last saw her in 09/2018.      HISTORY OF PRESENT ILLNESS:  Colletta got her second round of ocrelizumab fusion on 10/02/2018.  The first infusions were in 08/2017.  She tolerated the infusion fine.  We have not been following her B-cell counts to date, but plan to start doing so now.  She has been stable from a symptomatic standpoint.  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  She has obtained a treadmill and is exercising a bit more and thinks that has helped her ambulation.  She has not had any significant new non-neurologic medical issues since I saw her last.      PHYSICAL EXAMINATION:   VITAL SIGNS:  Blood pressure 102/69.  Pulse 76.  Weight 138 pounds.   NEUROLOGIC EXAM:  She is alert and oriented.  Affect is bright and language functions are normal.  Cranial nerves are unremarkable.  Muscle bulk and tone are normal.  Strength in the right arm is normal, but shoulder abduction and finger extension are both mildly weak on the left.  Hip flexion is weak at 4/5 on the right and 3/5 on the left.  Knee flexion is weak on the left as well.  She has a spastic, ataxic, left hemiparetic gait.      IMPRESSION:  Primary progressive multiple sclerosis, clinically stable on ocrelizumab.      I spent 27 minutes with Colletta today, greater than 50% of which was spent in counseling and coordination of care.  She had called wanting to try low dose naltrexone and we discussed that today.  There is evidence of benefit in animal models of MS, but little evidence in actual patients.  She has been tolerating it well and thinks it is helping.  I told her I think it is, at worst, harmless and if she is getting a subjective benefit it is fine to continue.  We discussed the timing of ocrelizumab doses and  how I prefer to base that on B-cell repopulation, which could be monitored with a blood test.      PLAN:   1. CD19 count today.   2. I see that I have previously discussed the clinical trial of ACTH with her, but we had opted to defer that while we were starting ocrelizumab and dalfampridine.  That study is still enrolling and she would be a candidate if interested.  I will check with the  about that.   3. Return to clinic in 6 months.         FAHAD HUIZAR MD             D: 2019   T: 2019   MT: [id number]      Name:     SORRELL, COLLETTA   MRN:      [id number]        Account:      [id number]   :      1960           Service Date: 2019      Document: [id number]

## 2019-05-15 DIAGNOSIS — Z13.1 SCREENING FOR DIABETES MELLITUS (DM): ICD-10-CM

## 2019-05-15 DIAGNOSIS — G35 MULTIPLE SCLEROSIS (H): ICD-10-CM

## 2019-05-15 DIAGNOSIS — Z00.00 ROUTINE GENERAL MEDICAL EXAMINATION AT A HEALTH CARE FACILITY: ICD-10-CM

## 2019-05-15 DIAGNOSIS — Z13.6 CARDIOVASCULAR SCREENING; LDL GOAL LESS THAN 160: ICD-10-CM

## 2019-05-15 LAB
CD19 CELLS # BLD: 28 CELLS/UL (ref 107–698)
CD19 CELLS NFR BLD: 2 % (ref 6–27)
CHOLEST SERPL-MCNC: 215 MG/DL
GLUCOSE SERPL-MCNC: 85 MG/DL (ref 70–99)
HDLC SERPL-MCNC: 63 MG/DL
LDLC SERPL CALC-MCNC: 132 MG/DL
NONHDLC SERPL-MCNC: 152 MG/DL
TRIGL SERPL-MCNC: 100 MG/DL

## 2019-05-15 PROCEDURE — 86355 B CELLS TOTAL COUNT: CPT | Performed by: FAMILY MEDICINE

## 2019-05-15 PROCEDURE — 36415 COLL VENOUS BLD VENIPUNCTURE: CPT | Performed by: FAMILY MEDICINE

## 2019-05-15 PROCEDURE — 80061 LIPID PANEL: CPT | Performed by: FAMILY MEDICINE

## 2019-05-15 PROCEDURE — 82947 ASSAY GLUCOSE BLOOD QUANT: CPT | Performed by: FAMILY MEDICINE

## 2019-05-17 ENCOUNTER — TELEPHONE (OUTPATIENT)
Dept: PEDIATRICS | Facility: CLINIC | Age: 59
End: 2019-05-17

## 2019-05-17 NOTE — RESULT ENCOUNTER NOTE
Please inform patient of normal test results for fasting blood sugar and slightly elevated fasting cholesterol.  Patient canceled her physical appointment in March, labs were ordered at that time  Please inform patient to schedule for physical

## 2019-05-17 NOTE — TELEPHONE ENCOUNTER
Result Notes for Lipid panel reflex to direct LDL Fasting     Notes recorded by Radha Jaffe MD on 5/17/2019 at 12:07 PM CDT  Please inform patient of normal test results for fasting blood sugar and slightly elevated fasting cholesterol.  Patient canceled her physical appointment in March, labs were ordered at that time  Please inform patient to schedule for physical

## 2019-05-17 NOTE — TELEPHONE ENCOUNTER
Attempt #1:  Left message for patient on home phone number to return call to clinic. Nidia GRIMES CMA

## 2019-05-18 NOTE — TELEPHONE ENCOUNTER
Advised of results and recommendation to make OV for annual physical.  Colletta states her neurologist Dr. Shannon ordered the lipid panel.        Colletta verbalizes understanding of results and recommendations.

## 2019-05-18 NOTE — TELEPHONE ENCOUNTER
Regarding: test results  ----- Message from Ivanna Jasso sent at 5/17/2019  7:26 PM CDT -----  Reason for call:  Other   Patient called regarding (reason for call): returning call  Additional comments: Returning clinic call for test results for lipid panel on 05/15/19 from Dr. Jaffe    Phone number to reach patient:  Cell number on file:  Telephone Information:  Mobile          [phone]      Best Time:  any    Can we leave a detailed message on this number?  YES

## 2019-05-30 ENCOUNTER — TELEPHONE (OUTPATIENT)
Dept: NEUROLOGY | Facility: CLINIC | Age: 59
End: 2019-05-30

## 2019-05-30 NOTE — TELEPHONE ENCOUNTER
[facility] Call Center    Phone Message    May a detailed message be left on voicemail: no    Reason for Call: Medication Question or concern regarding medication   Prescription Clarification  Name of Medication: Naltrexone POWD  Prescribing Provider: Dr. Barrera Shannon   Pharmacy: [facility], [address], [place] Phone: [phone]   What on the order needs clarification? They received a previous refill request for this medication and then another one today with a different dosage and need to know if they should fill both, or one? Please call to clarify at [phone].     Action Taken: Message routed to:  [facility] Neurology Clinic

## 2019-05-31 NOTE — TELEPHONE ENCOUNTER
Called pharmacy and clarified that script should be for 4.5 mg. New Rx sent on behalf of Dr. Shannon (see previous encounter).

## 2019-06-03 NOTE — TELEPHONE ENCOUNTER
I called [facility] pharmacy and provided a verbal authorization for them to dispense naltrexone 4.5mg capsules #30 with 11 refills per MS refill protocol.    Tata Roberts MS RN Care Coordinator

## 2019-06-03 NOTE — TELEPHONE ENCOUNTER
Health Call Center    Phone Message    May a detailed message be left on voicemail: no    Reason for Call: Medication Question or concern regarding medication   Prescription Clarification  Name of Medication: Naltrexone POWD  Prescribing Provider: Dr. Shannon   Pharmacy: [facility], Phone [phone]   What on the order needs clarification? Pt is now requesting to have filled at their pharmacy. Original prescription was for a solution, but they are wondering if they can do capsules instead? Also, need to know what the supply/refills are for this as well. Please call back at [phone]. Ask for Adelia, but if not available you can speak with anyone.     Action Taken: Message routed to:  [facility] Neurology Clinic

## 2019-07-11 ENCOUNTER — TELEPHONE (OUTPATIENT)
Dept: NEUROLOGY | Facility: CLINIC | Age: 59
End: 2019-07-11

## 2019-07-11 DIAGNOSIS — G35 MULTIPLE SCLEROSIS (H): Primary | ICD-10-CM

## 2019-07-11 NOTE — TELEPHONE ENCOUNTER
Patient is due to have her CD19 B cell count checked at this time; Lab order placed per Dr. Shannon; I left [facility] for the patient advising this and that she can go to any [facility] location to have that drawn, or if she would like to have it drawn elsewhere, to call us and let us know where.    Tata Roberts, MS RN Care Coordinator

## 2019-07-15 ENCOUNTER — TELEPHONE (OUTPATIENT)
Dept: NEUROLOGY | Facility: CLINIC | Age: 59
End: 2019-07-15

## 2019-07-15 DIAGNOSIS — E55.9 VITAMIN D DEFICIENCY: Primary | ICD-10-CM

## 2019-07-15 NOTE — TELEPHONE ENCOUNTER
I would need to see a vitamin D level before increasing her supplementation about 5000 international unit(s) per day.  (We can order that if she wants).   For vitamin A, yes, she would need to talk to primary care about that, that is not something I prescribe.

## 2019-07-15 NOTE — TELEPHONE ENCOUNTER
Dr. Shannon, please see the message I received from the call center; Are you willing to change her Vitamin D dose from 5000 to 7000 as the patient requests? Are you willing to provide a Vitamin A prescription that she is requesting, or does she need to request that through her PCP? Thank you.    Tata Roberts, MS RN Care Coordinator

## 2019-07-15 NOTE — TELEPHONE ENCOUNTER
I called Collette to discuss Dr. Shannon's input; She is rather insistent on her Vitamin D intake increasing, as her chiropractor discussed this with her; I did my best to explain to her that in order for Dr. Shannon to feel comfortable prescribing an increase in Vitamin D intake, then she needs to have her Vitamin D level checked; She is agreeable to this, as she has to have other lab work drawn at this time as well; We didn't even get to talking about the Vitamin A supplementation, as Colletta is asking for Dr. Shannon to call her chiropractor directly to explain what he apparently explained to her in this regard; Her chiropractor is Dr. Aj Bearden at [facility] (phone [phone]); I told her that I would pass the information to Dr. Shannon, and she is requesting he call directly to discuss mutual patient care; Will route to Dr. Shannon.    Tata Roberts MS RN Care Coordinator

## 2019-07-15 NOTE — TELEPHONE ENCOUNTER
[facility] Call Center    Phone Message    May a detailed message be left on voicemail: yes    Reason for Call: Medication Question or concern regarding medication   Prescription Clarification  Name of Medication: Vit D3 and start Vit A  Prescribing Provider: Dr. Shannon   Pharmacy: [facility] - [place], [place] - [address]   What on the order needs clarification? Colletta calling to request an increase in her Vit D3 from 5000 mg to 7000 mg.  She is also requesting a new Rx for Vit A 04520 mg daily.  Please call her back to advise          Action Taken: Message routed to:  [facility]  Neurology

## 2019-07-29 ENCOUNTER — TELEPHONE (OUTPATIENT)
Dept: PEDIATRICS | Facility: CLINIC | Age: 59
End: 2019-07-29

## 2019-07-29 ENCOUNTER — TELEPHONE (OUTPATIENT)
Dept: NEUROLOGY | Facility: CLINIC | Age: 59
End: 2019-07-29

## 2019-07-29 DIAGNOSIS — Z86.39 HISTORY OF HIGH CHOLESTEROL: Primary | ICD-10-CM

## 2019-07-29 DIAGNOSIS — Z86.39 HISTORY OF HIGH CHOLESTEROL: ICD-10-CM

## 2019-07-29 DIAGNOSIS — G35 MULTIPLE SCLEROSIS (H): ICD-10-CM

## 2019-07-29 DIAGNOSIS — E55.9 VITAMIN D DEFICIENCY: ICD-10-CM

## 2019-07-29 LAB
CD19 CELLS # BLD: 63 CELLS/UL (ref 107–698)
CD19 CELLS NFR BLD: 4 % (ref 6–27)

## 2019-07-29 PROCEDURE — 86355 B CELLS TOTAL COUNT: CPT | Performed by: PSYCHIATRY & NEUROLOGY

## 2019-07-29 PROCEDURE — 82306 VITAMIN D 25 HYDROXY: CPT | Performed by: PSYCHIATRY & NEUROLOGY

## 2019-07-29 PROCEDURE — 80061 LIPID PANEL: CPT | Performed by: PSYCHIATRY & NEUROLOGY

## 2019-07-29 PROCEDURE — 36415 COLL VENOUS BLD VENIPUNCTURE: CPT | Performed by: PSYCHIATRY & NEUROLOGY

## 2019-07-29 NOTE — TELEPHONE ENCOUNTER
[facility] Call Center    Phone Message    May a detailed message be left on voicemail: yes    Reason for Call: Order(s): Other:   Reason for requested: Cholesterol order for today's (7/29/2019) labs  Date needed: asap  Provider name: Dr Shannon    Pt had labs pulled today and would like to have Cholesterol tested as well. Spoke to Annette, she advised to send a message to request      Action Taken: Message routed to:  [facility]

## 2019-07-29 NOTE — TELEPHONE ENCOUNTER
Dr. Shannon, are you willing to add on a cholesterol level for the patient with the labs she had drawn this morning? Thank you.    Tata Roberts, MS RN Care Coordinator

## 2019-07-29 NOTE — TELEPHONE ENCOUNTER
[facility] Call Center    Phone Message    May a detailed message be left on voicemail: yes    Reason for Call: Order(s): Other:   Patient just had labs drawn at . However, she is requesting cholesterol be part of the labs. Informed patient that labs were not ordered by  provider and to contact her Dr that ordered her labs, but she insisted then to contact the last Dr she saw at  to have the cholesterol part of the labs from today.  Please contact patient to advise.      Action Taken: Message routed to:  Primary Care p [phone]

## 2019-07-29 NOTE — TELEPHONE ENCOUNTER
Patient just had a fasting lipid done on 5/15/19, she cancelled her AFE that was scheduled on that day, see the results in epic  I saw her once for preop in 9/2018  I can't order another fasting lipids   She can check with her neurologist

## 2019-07-30 ENCOUNTER — TELEPHONE (OUTPATIENT)
Dept: NEUROLOGY | Facility: CLINIC | Age: 59
End: 2019-07-30

## 2019-07-30 LAB
CHOLEST SERPL-MCNC: 210 MG/DL
DEPRECATED CALCIDIOL+CALCIFEROL SERPL-MC: 78 UG/L (ref 20–75)
HDLC SERPL-MCNC: 73 MG/DL
LDLC SERPL CALC-MCNC: 120 MG/DL
NONHDLC SERPL-MCNC: 137 MG/DL
TRIGL SERPL-MCNC: 83 MG/DL

## 2019-07-30 RX ORDER — HEPARIN SODIUM,PORCINE 10 UNIT/ML
5 VIAL (ML) INTRAVENOUS
Status: CANCELLED | OUTPATIENT
Start: 2019-07-30

## 2019-07-30 RX ORDER — HEPARIN SODIUM (PORCINE) LOCK FLUSH IV SOLN 100 UNIT/ML 100 UNIT/ML
5 SOLUTION INTRAVENOUS
Status: CANCELLED | OUTPATIENT
Start: 2019-07-30

## 2019-07-30 RX ORDER — ACETAMINOPHEN 325 MG/1
650 TABLET ORAL ONCE
Status: CANCELLED | OUTPATIENT
Start: 2019-07-30

## 2019-07-30 RX ORDER — METHYLPREDNISOLONE SODIUM SUCCINATE 125 MG/2ML
125 INJECTION, POWDER, LYOPHILIZED, FOR SOLUTION INTRAMUSCULAR; INTRAVENOUS ONCE
Status: CANCELLED | OUTPATIENT
Start: 2019-07-30

## 2019-07-30 RX ORDER — DIPHENHYDRAMINE HCL 25 MG
50 CAPSULE ORAL ONCE
Status: CANCELLED | OUTPATIENT
Start: 2019-07-30

## 2019-07-30 NOTE — TELEPHONE ENCOUNTER
Dr. Shannon fine with adding on cholesterol level; Lab order placed per Dr. Shannon (defaulted to lipid panel); I called [facility] and they will run the add-on.    Tata Roberts MS RN Care Coordinator

## 2019-07-30 NOTE — TELEPHONE ENCOUNTER
Prior Authorization Infusion/Clinic Administered Request    Location: [facility]   Diagnosis and ICD: Primary Progressive Multiple Sclerosis, G35  Drug/Therapy: Ocrevus 600mg IV x1 dose    Previously Tried and Failed Therapies: n/a    Date of provider note with supporting information: 4/16/19    Urgency (When is the patient scheduled?): ASAP    Would you like to include any research articles? n/a        If yes please call [phone] for further instructions about sending that information

## 2019-07-30 NOTE — TELEPHONE ENCOUNTER
Health Call Center    Phone Message    May a detailed message be left on voicemail: yes    Reason for Call: Requesting Results   Name/type of test: Blood Work  Date of test: 7.29.19  Was test done at a location other than [facility]?: No    Requesting that Dr. Shannon review her lab results to approval medication requests.      Action Taken: Message routed to:  [facility] neurology

## 2019-07-30 NOTE — TELEPHONE ENCOUNTER
Ocrevus therapy plan orders signed by Dr. Shannon; I have sent a PA request over to our infusion finance team.    Tata Roberts, MS RN Care Coordinator

## 2019-07-31 NOTE — TELEPHONE ENCOUNTER
Her vitamin D level (78) is perfect from an MS perspective (we are aiming for a level between 60 and 80).  So, I would not recommend an increase in her supplementation dose (nor would I prescribe one).  If one of her other doctors wants her to take a higher dose for some reason, they can prescribe it (it is also available over the counter), but I recommend staying on her current dose.  The main risk of taking higher doses would be kidney stones.

## 2019-08-01 NOTE — TELEPHONE ENCOUNTER
I called Colletta and provided her Dr. Shannon's input; She understood.    Tata Roberts, MS RN Care Coordinator

## 2019-08-01 NOTE — TELEPHONE ENCOUNTER
Ocrevus approved and patient can get scheduled for her infusion; She will call to arrange that.    Tata Roberts MS RN Care Coordinator

## 2019-08-02 ENCOUNTER — TELEPHONE (OUTPATIENT)
Dept: NEUROLOGY | Facility: CLINIC | Age: 59
End: 2019-08-02

## 2019-08-02 NOTE — TELEPHONE ENCOUNTER
PA Initiation    Medication: Dalfampridine ER  Insurance Company: [employer] - Phone [phone] Fax [phone]  Pharmacy Filling the Rx: [facility] - [place], [place] - [facility] [address]  Filling Pharmacy Phone: [phone]  Filling Pharmacy Fax: [phone]  Start Date: 8/2/2019    [place] PA Fax Sent To Insurance #[phone].

## 2019-08-12 ENCOUNTER — OFFICE VISIT (OUTPATIENT)
Dept: FAMILY MEDICINE | Facility: CLINIC | Age: 59
End: 2019-08-12
Payer: COMMERCIAL

## 2019-08-12 VITALS
BODY MASS INDEX: 22.66 KG/M2 | HEART RATE: 72 BPM | DIASTOLIC BLOOD PRESSURE: 67 MMHG | SYSTOLIC BLOOD PRESSURE: 103 MMHG | RESPIRATION RATE: 16 BRPM | WEIGHT: 136 LBS | OXYGEN SATURATION: 96 % | TEMPERATURE: 98.3 F | HEIGHT: 65 IN

## 2019-08-12 DIAGNOSIS — R07.9 CHEST PAIN, UNSPECIFIED TYPE: Primary | ICD-10-CM

## 2019-08-12 DIAGNOSIS — Z00.00 HEALTHCARE MAINTENANCE: ICD-10-CM

## 2019-08-12 RX ORDER — OXYBUTYNIN CHLORIDE 10 MG/1
TABLET, EXTENDED RELEASE ORAL
COMMUNITY
Start: 2019-04-08 | End: 2019-08-12

## 2019-08-12 ASSESSMENT — MIFFLIN-ST. JEOR: SCORE: 1192.77

## 2019-08-12 NOTE — NURSING NOTE
"59 year old  Chief Complaint   Patient presents with     Physical     59 yrs old,  non fasting        Blood pressure 103/67, pulse 72, temperature 98.3  F (36.8  C), temperature source Oral, resp. rate 16, height 1.651 m (5' 5\"), weight 61.7 kg (136 lb), SpO2 96 %, not currently breastfeeding. Body mass index is 22.63 kg/m .  Patient Active Problem List   Diagnosis     Multiple sclerosis (H)     Ankle swelling, left     Anxiety     Cigarette smoker     Fatigue     Meralgia paresthetica     Multinodular thyroid     Neuropathy     Neutropenia (H)     Cubital tunnel syndrome, left       Wt Readings from Last 2 Encounters:   19 61.7 kg (136 lb)   19 62.9 kg (138 lb 9.6 oz)     BP Readings from Last 3 Encounters:   19 103/67   19 102/69   10/02/18 102/65         Current Outpatient Medications   Medication     Alpha-Lipoic Acid 300 MG CAPS     Ascorbic Acid (VITAMIN C PO)     B Complex Vitamins (VITAMIN B COMPLEX PO)     CALCIUM-VITAMIN D PO     cholecalciferol (VITAMIN D3) 5000 units CAPS capsule     Cyanocobalamin (VITAMIN B 12 PO)     Incontinence Supply Disposable (DEPEND PANT SM/MED) MISC     MAGNESIUM OXIDE PO     melatonin 3 MG tablet     Multiple Vitamins-Minerals (MULTIVITAMIN ADULT) TABS     Naltrexone POWD     Vitamin A 28254 units CAPS     Omega-3 Fatty Acids (FISH OIL) 1200 MG capsule     oxybutynin ER (DITROPAN XL) 15 MG 24 hr tablet     Probiotic Product (DAILY PROBIOTIC) CAPS     No current facility-administered medications for this visit.        Social History     Tobacco Use     Smoking status: Former Smoker     Types: Cigarettes     Last attempt to quit: 2017     Years since quittin.5     Smokeless tobacco: Never Used   Substance Use Topics     Alcohol use: Yes     Alcohol/week: 0.6 oz     Types: 1 Glasses of wine per week     Comment: EVERY 2 WEEKS     Drug use: No       Health Maintenance Due   Topic Date Due     PREVENTIVE CARE VISIT  1960     HEPATITIS C " SCREENING  1960     ADVANCE CARE PLANNING  1960     MAMMO SCREENING  1960     PAP  1960     COLONOSCOPY  02/10/1970     HIV SCREENING  02/10/1975     ZOSTER IMMUNIZATION (1 of 2) 02/10/2010     PHQ-2  01/01/2019       No results found for: PAP      August 12, 2019 9:44 AM
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
## 2019-08-14 ENCOUNTER — INFUSION THERAPY VISIT (OUTPATIENT)
Dept: INFUSION THERAPY | Facility: CLINIC | Age: 59
End: 2019-08-14
Payer: COMMERCIAL

## 2019-08-14 VITALS
DIASTOLIC BLOOD PRESSURE: 65 MMHG | HEART RATE: 66 BPM | TEMPERATURE: 97.6 F | OXYGEN SATURATION: 100 % | RESPIRATION RATE: 16 BRPM | SYSTOLIC BLOOD PRESSURE: 108 MMHG

## 2019-08-14 DIAGNOSIS — Z00.00 HEALTHCARE MAINTENANCE: ICD-10-CM

## 2019-08-14 DIAGNOSIS — G35 MULTIPLE SCLEROSIS (H): Primary | ICD-10-CM

## 2019-08-14 DIAGNOSIS — R07.9 CHEST PAIN, UNSPECIFIED TYPE: ICD-10-CM

## 2019-08-14 LAB
ALBUMIN SERPL-MCNC: 4 G/DL (ref 3.4–5)
ALP SERPL-CCNC: 60 U/L (ref 40–150)
ALT SERPL W P-5'-P-CCNC: 25 U/L (ref 0–50)
ANION GAP SERPL CALCULATED.3IONS-SCNC: 5 MMOL/L (ref 3–14)
AST SERPL W P-5'-P-CCNC: 17 U/L (ref 0–45)
BILIRUB SERPL-MCNC: 0.4 MG/DL (ref 0.2–1.3)
BUN SERPL-MCNC: 6 MG/DL (ref 7–30)
CALCIUM SERPL-MCNC: 9 MG/DL (ref 8.5–10.1)
CHLORIDE SERPL-SCNC: 110 MMOL/L (ref 94–109)
CO2 SERPL-SCNC: 28 MMOL/L (ref 20–32)
CREAT SERPL-MCNC: 0.7 MG/DL (ref 0.52–1.04)
ERYTHROCYTE [DISTWIDTH] IN BLOOD BY AUTOMATED COUNT: 14.6 % (ref 10–15)
GFR SERPL CREATININE-BSD FRML MDRD: >90 ML/MIN/{1.73_M2}
GLUCOSE SERPL-MCNC: 86 MG/DL (ref 70–99)
HCT VFR BLD AUTO: 40.1 % (ref 35–47)
HCV AB SERPL QL IA: NONREACTIVE
HGB BLD-MCNC: 12.9 G/DL (ref 11.7–15.7)
HIV 1+2 AB+HIV1 P24 AG SERPL QL IA: NONREACTIVE
MCH RBC QN AUTO: 28.1 PG (ref 26.5–33)
MCHC RBC AUTO-ENTMCNC: 32.2 G/DL (ref 31.5–36.5)
MCV RBC AUTO: 87 FL (ref 78–100)
PLATELET # BLD AUTO: 245 10E9/L (ref 150–450)
POTASSIUM SERPL-SCNC: 3.9 MMOL/L (ref 3.4–5.3)
PROT SERPL-MCNC: 7.6 G/DL (ref 6.8–8.8)
RBC # BLD AUTO: 4.59 10E12/L (ref 3.8–5.2)
SODIUM SERPL-SCNC: 143 MMOL/L (ref 133–144)
T4 FREE SERPL-MCNC: 0.81 NG/DL (ref 0.76–1.46)
TSH SERPL DL<=0.005 MIU/L-ACNC: 0.28 MU/L (ref 0.4–4)
WBC # BLD AUTO: 3.2 10E9/L (ref 4–11)

## 2019-08-14 PROCEDURE — 84439 ASSAY OF FREE THYROXINE: CPT | Performed by: FAMILY MEDICINE

## 2019-08-14 PROCEDURE — 86803 HEPATITIS C AB TEST: CPT | Performed by: FAMILY MEDICINE

## 2019-08-14 PROCEDURE — 80053 COMPREHEN METABOLIC PANEL: CPT | Performed by: FAMILY MEDICINE

## 2019-08-14 PROCEDURE — 87389 HIV-1 AG W/HIV-1&-2 AB AG IA: CPT | Performed by: FAMILY MEDICINE

## 2019-08-14 PROCEDURE — 84443 ASSAY THYROID STIM HORMONE: CPT | Performed by: FAMILY MEDICINE

## 2019-08-14 PROCEDURE — 85027 COMPLETE CBC AUTOMATED: CPT | Performed by: FAMILY MEDICINE

## 2019-08-14 PROCEDURE — 99207 ZZC NO CHARGE NURSE ONLY: CPT

## 2019-08-14 PROCEDURE — 96415 CHEMO IV INFUSION ADDL HR: CPT | Performed by: NURSE PRACTITIONER

## 2019-08-14 PROCEDURE — 96375 TX/PRO/DX INJ NEW DRUG ADDON: CPT | Performed by: NURSE PRACTITIONER

## 2019-08-14 PROCEDURE — 96413 CHEMO IV INFUSION 1 HR: CPT | Performed by: NURSE PRACTITIONER

## 2019-08-14 PROCEDURE — 36415 COLL VENOUS BLD VENIPUNCTURE: CPT | Performed by: FAMILY MEDICINE

## 2019-08-14 RX ORDER — METHYLPREDNISOLONE SODIUM SUCCINATE 125 MG/2ML
125 INJECTION, POWDER, LYOPHILIZED, FOR SOLUTION INTRAMUSCULAR; INTRAVENOUS ONCE
Status: COMPLETED | OUTPATIENT
Start: 2019-08-14 | End: 2019-08-14

## 2019-08-14 RX ORDER — ACETAMINOPHEN 325 MG/1
650 TABLET ORAL ONCE
Status: CANCELLED | OUTPATIENT
Start: 2019-08-14

## 2019-08-14 RX ORDER — METHYLPREDNISOLONE SODIUM SUCCINATE 125 MG/2ML
125 INJECTION, POWDER, LYOPHILIZED, FOR SOLUTION INTRAMUSCULAR; INTRAVENOUS ONCE
Status: CANCELLED | OUTPATIENT
Start: 2019-08-14

## 2019-08-14 RX ORDER — DIPHENHYDRAMINE HCL 25 MG
50 CAPSULE ORAL ONCE
Status: CANCELLED | OUTPATIENT
Start: 2019-08-14

## 2019-08-14 RX ORDER — HEPARIN SODIUM,PORCINE 10 UNIT/ML
5 VIAL (ML) INTRAVENOUS
Status: CANCELLED | OUTPATIENT
Start: 2019-08-14

## 2019-08-14 RX ORDER — HEPARIN SODIUM (PORCINE) LOCK FLUSH IV SOLN 100 UNIT/ML 100 UNIT/ML
5 SOLUTION INTRAVENOUS
Status: CANCELLED | OUTPATIENT
Start: 2019-08-14

## 2019-08-14 RX ORDER — DIPHENHYDRAMINE HCL 25 MG
50 CAPSULE ORAL ONCE
Status: COMPLETED | OUTPATIENT
Start: 2019-08-14 | End: 2019-08-14

## 2019-08-14 RX ORDER — ACETAMINOPHEN 325 MG/1
650 TABLET ORAL ONCE
Status: COMPLETED | OUTPATIENT
Start: 2019-08-14 | End: 2019-08-14

## 2019-08-14 RX ADMIN — Medication 250 ML: at 09:58

## 2019-08-14 RX ADMIN — ACETAMINOPHEN 650 MG: 325 TABLET ORAL at 09:34

## 2019-08-14 RX ADMIN — METHYLPREDNISOLONE SODIUM SUCCINATE 125 MG: 125 INJECTION INTRAMUSCULAR; INTRAVENOUS at 09:58

## 2019-08-14 RX ADMIN — Medication 50 MG: at 09:34

## 2019-08-14 ASSESSMENT — PAIN SCALES - GENERAL: PAINLEVEL: NO PAIN (0)

## 2019-09-24 DIAGNOSIS — G35 MULTIPLE SCLEROSIS (H): ICD-10-CM

## 2019-09-26 RX ORDER — OXYBUTYNIN CHLORIDE 15 MG/1
15 TABLET, EXTENDED RELEASE ORAL DAILY
Qty: 30 TABLET | Refills: 11 | Status: SHIPPED | OUTPATIENT
Start: 2019-09-26 | End: 2020-10-06

## 2019-09-26 NOTE — TELEPHONE ENCOUNTER
Received refill request for Oxybutnin from [facility]; Patient was last seen on 4/16/2019 and has follow up appointment on 10/15/2019 with Dr Shannon. Pended to MS pool for review/approval    Alissa Obrien MA

## 2019-11-26 ENCOUNTER — OFFICE VISIT (OUTPATIENT)
Dept: NEUROLOGY | Facility: CLINIC | Age: 59
End: 2019-11-26
Attending: PSYCHIATRY & NEUROLOGY
Payer: COMMERCIAL

## 2019-11-26 VITALS
BODY MASS INDEX: 22.66 KG/M2 | HEART RATE: 70 BPM | HEIGHT: 65 IN | WEIGHT: 136 LBS | DIASTOLIC BLOOD PRESSURE: 72 MMHG | SYSTOLIC BLOOD PRESSURE: 109 MMHG

## 2019-11-26 DIAGNOSIS — G35 MS (MULTIPLE SCLEROSIS) (H): Primary | ICD-10-CM

## 2019-11-26 ASSESSMENT — MIFFLIN-ST. JEOR: SCORE: 1192.77

## 2019-11-26 ASSESSMENT — PAIN SCALES - GENERAL: PAINLEVEL: NO PAIN (0)

## 2019-11-26 NOTE — LETTER
"    RE: Colletta Dwen Sorrell  [address]  [place] [facility] [address]       Service Date: 11/26/2019      REASON FOR VISIT:  Colletta Sorrell is a 59-year-old woman who I follow for multiple sclerosis.  She returns for regular followup.  I last saw her in April of this year.      HISTORY OF PRESENT ILLNESS:  Colletta has been neurologically stable.  She had her last ocrelizumab infusion on 08/14 of this year.  B cell count was 63 on 07/29.  Her previous infusion was 09/2018, implying that infusing every 11 months is appropriate for her.  She also continues to take low dose naltrexone, lipoic acid and \"a bunch of vitamins.\"  She has weakness of both legs and the left arm and uses a cane when away from home, but ambulates without assistance at home.  She gets some word finding difficulty when tired and fatigue is an issue.  She has bladder urgency and frequency.  Overall, her symptoms are stable.  She tells me she will be moving to [place] soon.  She has not been exercising as much because of guests staying with her and no space for her treadmill, but hopes to resume that when she moves.      PHYSICAL EXAMINATION:   VITAL SIGNS:  Blood pressure 109/72.  Pulse 70.  Weight 136 pounds.   GENERAL:  She is alert and oriented.  Affect is bright and language functions are normal.  Eye movements are full without diplopia or nystagmus.  Facial strength and sensation are normal.  Palate elevates midline and there is no dysarthria or dysphonia.  Muscle bulk and tone are normal.  Shoulder abduction strength is normal today, but finger abduction is mildly weak on the left at 4/5.  Hip flexion is 4/5 bilaterally.  Knee flexion and ankle dorsiflexion are 4/5 on the left, normal on the right.  Finger-nose-finger and finger tapping are normal, but toe tapping is slow and clumsy.  Light touch is subjectively diminished in the fourth and fifth fingers of the left hand.  Deep tendon reflexes are normal and symmetric at the " "elbows and knees.  She has a spastic, ataxic, left hemiparetic gait.      IMPRESSION:  Primary progressive multiple sclerosis, stable on ocrelizumab.      I spent 31 minutes with Colletta, greater than 50% of which was spent in counseling and coordination of care.  She had a lot of questions as usual.  She asked if I was familiar with the \"lab rat study\" and I told her I was not certain what study she was talking about.  She also asked about ZH22-VHO, which is an antibody-drug conjugate in preclinical trials for depleting CD45 positive lymphocytes.  We reviewed the mechanism of action of ocrelizumab at her request as well as the evidence in the primary progressive MS clinical trial.  I told her it is difficult to tell in an individual patient whether it is working, but given her stability, I think we should continue it.      PLAN:   1.  Repeat ocrelizumab infusion in 2020, then every 11 months.   2.  Follow up in 6 months with Harmony Egan.      Barrera Shannon MD      D: 2019   T: 2019   MT: CRYSTAL      Name:     SORRELL, COLLETTA   MRN:      [id number]        Account:      [id number]   :      1960           Service Date: 2019      Document: [id number]        "

## 2019-12-02 NOTE — PROGRESS NOTES
"Service Date: 11/26/2019      REASON FOR VISIT:  Colletta Sorrell is a 59-year-old woman who I follow for multiple sclerosis.  She returns for regular followup.  I last saw her in April of this year.      HISTORY OF PRESENT ILLNESS:  Colletta has been neurologically stable.  She had her last ocrelizumab infusion on 08/14 of this year.  B cell count was 63 on 07/29.  Her previous infusion was 09/2018, implying that infusing every 11 months is appropriate for her.  She also continues to take low dose naltrexone, lipoic acid and \"a bunch of vitamins.\"  She has weakness of both legs and the left arm and uses a cane when away from home, but ambulates without assistance at home.  She gets some word finding difficulty when tired and fatigue is an issue.  She has bladder urgency and frequency.  Overall, her symptoms are stable.  She tells me she will be moving to [place] soon.  She has not been exercising as much because of guests staying with her and no space for her treadmill, but hopes to resume that when she moves.      PHYSICAL EXAMINATION:   VITAL SIGNS:  Blood pressure 109/72.  Pulse 70.  Weight 136 pounds.   GENERAL:  She is alert and oriented.  Affect is bright and language functions are normal.  Eye movements are full without diplopia or nystagmus.  Facial strength and sensation are normal.  Palate elevates midline and there is no dysarthria or dysphonia.  Muscle bulk and tone are normal.  Shoulder abduction strength is normal today, but finger abduction is mildly weak on the left at 4/5.  Hip flexion is 4/5 bilaterally.  Knee flexion and ankle dorsiflexion are 4/5 on the left, normal on the right.  Finger-nose-finger and finger tapping are normal, but toe tapping is slow and clumsy.  Light touch is subjectively diminished in the fourth and fifth fingers of the left hand.  Deep tendon reflexes are normal and symmetric at the elbows and knees.  She has a spastic, ataxic, left hemiparetic gait.    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      I spent 31 minutes with Colletta, greater than 50% of which was spent in counseling and coordination of care.  She had a lot of questions as usual.  She asked if I was familiar with the \"lab rat study\" and I told her I was not certain what study she was talking about.  She also asked about IR68-CSM, which is an antibody-drug conjugate in preclinical trials for depleting CD45 positive lymphocytes.  We reviewed the mechanism of action of ocrelizumab at her request as well as the evidence in the primary progressive MS clinical trial.  I told her it is difficult to tell in an individual patient whether it is working, but given her stability, I think we should continue it.      PLAN:   1.  Repeat ocrelizumab infusion in 2020, then every 11 months.   2.  Follow up in 6 months with Harmony Egan.      MD FAHAD Candelario MD             D: 2019   T: 2019   MT: CRYSTAL      Name:     SORRELL, COLLETTA   MRN:      [id number]        Account:      [id number]   :      1960           Service Date: 2019      Document: [id number]      "

## 2020-01-06 ENCOUNTER — TELEPHONE (OUTPATIENT)
Dept: NEUROLOGY | Facility: CLINIC | Age: 60
End: 2020-01-06

## 2020-01-06 NOTE — TELEPHONE ENCOUNTER
[facility] Call Center    Phone Message    May a detailed message be left on voicemail: yes    Reason for Call: Form or Letter   Type or form/letter needing completion: handicap parking permit   Provider: Dr. Shannon  Date form needed: asap - Pt was wondering if Dr. Shannon can fill out his portion then send it to pt to complete.   Once completed: Mail form to Name: Colletta Sorrell, at Address: [address] [place] [address]      Action Taken: Message routed to:  [facility] Neuro

## 2020-01-07 NOTE — TELEPHONE ENCOUNTER
Disability parking permit filled out and signed by Dr. Shannon; I am putting this in the mail to the patient's home.    Tata Roberts MS RN Care Coordinator

## 2020-01-17 DIAGNOSIS — G35 MULTIPLE SCLEROSIS (H): ICD-10-CM

## 2020-01-20 RX ORDER — ALPHA LIPOIC ACID 300 MG
CAPSULE ORAL
Qty: 30 CAPSULE | Refills: 11 | Status: SHIPPED | OUTPATIENT
Start: 2020-01-20 | End: 2021-04-07

## 2020-03-03 ENCOUNTER — DOCUMENTATION ONLY (OUTPATIENT)
Dept: NEUROLOGY | Facility: CLINIC | Age: 60
End: 2020-03-03

## 2020-03-03 NOTE — PROGRESS NOTES
Patient mailed [employer] paperwork to be filled out and signed by Dr. Shannon; This has been filled out and mailed back to the patient for submission.    Tata Roberts, MS RN Care Coordinator

## 2020-03-16 DIAGNOSIS — G35 MULTIPLE SCLEROSIS (H): ICD-10-CM

## 2020-03-16 RX ORDER — CRANBERRY FRUIT EXTRACT 200 MG
CAPSULE ORAL
Qty: 30 CAPSULE | Refills: 11 | Status: SHIPPED | OUTPATIENT
Start: 2020-03-16

## 2020-03-16 RX ORDER — DIAPER,BRIEF,ADULT, DISPOSABLE
EACH MISCELLANEOUS
Qty: 36 EACH | Refills: 3 | Status: SHIPPED | OUTPATIENT
Start: 2020-03-16 | End: 2020-11-04

## 2020-06-01 ENCOUNTER — TELEPHONE (OUTPATIENT)
Dept: NEUROLOGY | Facility: CLINIC | Age: 60
End: 2020-06-01

## 2020-06-01 ENCOUNTER — VIRTUAL VISIT (OUTPATIENT)
Dept: NEUROLOGY | Facility: CLINIC | Age: 60
End: 2020-06-01
Attending: NURSE PRACTITIONER
Payer: COMMERCIAL

## 2020-06-01 DIAGNOSIS — G35 MULTIPLE SCLEROSIS (H): Primary | ICD-10-CM

## 2020-06-01 RX ORDER — HEPARIN SODIUM,PORCINE 10 UNIT/ML
5 VIAL (ML) INTRAVENOUS
Status: CANCELLED | OUTPATIENT
Start: 2020-06-01

## 2020-06-01 RX ORDER — ACETAMINOPHEN 325 MG/1
650 TABLET ORAL ONCE
Status: CANCELLED
Start: 2020-06-01

## 2020-06-01 RX ORDER — DIPHENHYDRAMINE HCL 25 MG
50 CAPSULE ORAL ONCE
Status: CANCELLED
Start: 2020-06-01

## 2020-06-01 RX ORDER — METHYLPREDNISOLONE SODIUM SUCCINATE 125 MG/2ML
125 INJECTION, POWDER, LYOPHILIZED, FOR SOLUTION INTRAMUSCULAR; INTRAVENOUS ONCE
Status: CANCELLED
Start: 2020-06-01

## 2020-06-01 RX ORDER — HEPARIN SODIUM (PORCINE) LOCK FLUSH IV SOLN 100 UNIT/ML 100 UNIT/ML
5 SOLUTION INTRAVENOUS
Status: CANCELLED | OUTPATIENT
Start: 2020-06-01

## 2020-06-01 NOTE — TELEPHONE ENCOUNTER
Ocrevus therapy plan orders signed by Dr. Shannon; I will send a PA request over to our infusion finance team.    Tata Roberts MS RN Care Coordinator

## 2020-06-01 NOTE — TELEPHONE ENCOUNTER
Patient is due to have her next Ocrevus infusion in July, based on the every 11 month dosing schedule that has been established; New Ocrevus therapy plan orders placed and routed to Dr. Shannon for review and signature; Patient is scheduled for a virtual visit with Harmony Egan today, so I asked Harmony to advise the patient we are starting to work on this for her; Once orders have been signed, I will send a PA request.    Tata Roberts, MS RN Care Coordinator

## 2020-06-01 NOTE — PATIENT INSTRUCTIONS
1. Continue vit D 5000 international unit(s) daily.     2. You are due for Ocrelizumab infusion in July. Our care coordinators will call you to set this up.     3. F/u with Dr. Shannon in 6 months.

## 2020-06-01 NOTE — PROGRESS NOTES
"Colletta Dwen Sorrell is a 60 year old female who is being evaluated via a billable telephone visit.      The patient has been notified of following:     \"This telephone visit will be conducted via a call between you and your physician/provider. We have found that certain health care needs can be provided without the need for a physical exam.  This service lets us provide the care you need with a short phone conversation.  If a prescription is necessary we can send it directly to your pharmacy.  If lab work is needed we can place an order for that and you can then stop by our lab to have the test done at a later time.    Telephone visits are billed at different rates depending on your insurance coverage. During this emergency period, for some insurers they may be billed the same as an in-person visit.  Please reach out to your insurance provider with any questions.    If during the course of the call the physician/provider feels a telephone visit is not appropriate, you will not be charged for this service.\"    Patient has given verbal consent for Telephone visit?  Yes    What phone number would you like to be contacted at? [phone]    How would you like to obtain your AVS? Mail a copy      [facility] CLINIC VIRTUAL VISIT    ** After review of patient s situation, this visit was changed from an in-person visit to a telephone visit to reduce risk of COVID19 exposure**      Colletta is a  60year old female who is being evaluated via a billable telephone visit on 6/1/for the diagnosis of Primary Progressive MS.      HISTORY OF PRESENT ILLNESS:   Patient started to notice \" intermittent limp\" in her left leg since 2007 2008.  This gradually increased in frequency and severity.  She was evaluated at [facility] but no diagnosis was established.    She was evaluated at [facility] in 2013 and had work-up including MRI scans showing lesions typical for multiple sclerosis as well as " spinal fluid analysis showing intrathecal IgG synthesis.  She was diagnosed with MS and started on Copaxone.  She never had any relapses instead her course has been 1 of insidiously progressive in nature.  Patient establish care with Dr. Shannon in 2017 and was started on ocrelizumab.       INTERVAL HISTORY SINCE LAST VISIT: Most recent visit with Dr. Shannon on 11/26/2019. Overall doing well since then. No recent hospitalization or illness. Patient denies any new changes in vision, balance, strength or sensation.     She is currently on Ocrelizumab and most recent infusion was on 8/14/2019. Infusions are going well.  Denies any side effects from medication.  She was also on low-dose naltrexone but stopped taking it for the past 2 to 3 months due to lack of insurance coverage.    She has weakness of both legs and the left arm and uses a cane for long distance walking, but ambulates without any assistance inside her house.  She is able to climb stairs and denies any recent falls.    She rates her fatigue in mild-to-moderate range.  She manages with intermittent naps during the daytime.  She sleeps 5 to 7 hours at night.  She has mild urinary urgency which is managed with Ditropan XL 15 mg daily.  No major issues with her bowel.    Vit D: 5000 international units daily.    Past medical/surgical history:  1.  Primary progressive MS  No surgeries in the past.    Social history:  She is single and have 3 adult children.  She works out of her home.  She denies smoking, alcohol or recreational drug use.    Family history:  Her dad had a diagnosis of MS.    PHYSICAL EXAM: N/A (telephone visit)     ASSESSMENT/ PLAN:   1.  Primary progressive MS, overall stable on ocrelizumab: Initial symptoms in 2007 with intermittent limp in her left leg which gradually increased in frequency and severity.  She was diagnosed with MS in 2013 and was treated with Copaxone until 2017.  Established care with Dr. Shannon in 2017. She was  given a diagnosis of primary progressive MS and was started on ocrelizumab.  Based on her previous CD19 cell count it takes about 11 months for her B cells to repopulate.  Her most recent ocrelizumab infusion was in August 2019.  She is due for her next infusion in July.  Our care coordinators will be in touch with the patient to get this scheduled.  Patient to follow-up with Dr. Shannon in 6 months.    2.  Vitamin D supplementation: Continue vitamin D 5000 international units daily.    3.  Fatigue: Patient reports her fatigue as mild to moderate.  Currently she is able to manage it with naps during the daytime.  I briefly discussed the options of medications like amantadine if her fatigue worsens in the future.    4. Please contact us with questions or concerns.     Harmony Egan CNP  [facility]          Phone call contact time:  Call Started at 12.35  Call Ended at 1.20

## 2020-06-01 NOTE — LETTER
"6/1/2020     RE: Colletta Dwen Sorrell  [address]  [place] [address]     Dear Colleague,    Thank you for referring your patient, Colletta Dwen Sorrell, to the [facility] MULTIPLE SCLEROSIS at [facility]. Please see a copy of my visit note below.    Colletta Dwen Sorrell is a 60 year old female who is being evaluated via a billable telephone visit.      The patient has been notified of following:     \"This telephone visit will be conducted via a call between you and your physician/provider. We have found that certain health care needs can be provided without the need for a physical exam.  This service lets us provide the care you need with a short phone conversation.  If a prescription is necessary we can send it directly to your pharmacy.  If lab work is needed we can place an order for that and you can then stop by our lab to have the test done at a later time.    Telephone visits are billed at different rates depending on your insurance coverage. During this emergency period, for some insurers they may be billed the same as an in-person visit.  Please reach out to your insurance provider with any questions.    If during the course of the call the physician/provider feels a telephone visit is not appropriate, you will not be charged for this service.\"    Patient has given verbal consent for Telephone visit?  Yes    What phone number would you like to be contacted at? [phone]    How would you like to obtain your AVS? Mail a copy      [facility] MS CLINIC VIRTUAL VISIT    ** After review of patient s situation, this visit was changed from an in-person visit to a telephone visit to reduce risk of COVID19 exposure**      Colletta is a  60year old female who is being evaluated via a billable telephone visit on 6/1/for the diagnosis of Primary Progressive MS.      HISTORY OF PRESENT ILLNESS:   Patient started to notice \" intermittent limp\" in her left " leg since 2007 2008.  This gradually increased in frequency and severity.  She was evaluated at [facility] but no diagnosis was established.    She was evaluated at [facility] in 2013 and had work-up including MRI scans showing lesions typical for multiple sclerosis as well as spinal fluid analysis showing intrathecal IgG synthesis.  She was diagnosed with MS and started on Copaxone.  She never had any relapses instead her course has been 1 of insidiously progressive in nature.  Patient establish care with Dr. Shannon in 2017 and was started on ocrelizumab.       INTERVAL HISTORY SINCE LAST VISIT: Most recent visit with Dr. Shannon on 11/26/2019. Overall doing well since then. No recent hospitalization or illness. Patient denies any new changes in vision, balance, strength or sensation.     She is currently on Ocrelizumab and most recent infusion was on 8/14/2019. Infusions are going well.  Denies any side effects from medication.  She was also on low-dose naltrexone but stopped taking it for the past 2 to 3 months due to lack of insurance coverage.    She has weakness of both legs and the left arm and uses a cane for long distance walking, but ambulates without any assistance inside her house.  She is able to climb stairs and denies any recent falls.    She rates her fatigue in mild-to-moderate range.  She manages with intermittent naps during the daytime.  She sleeps 5 to 7 hours at night.  She has mild urinary urgency which is managed with Ditropan XL 15 mg daily.  No major issues with her bowel.    Vit D: 5000 international units daily.    Past medical/surgical history:  1.  Primary progressive MS  No surgeries in the past.    Social history:  She is single and have 3 adult children.  She works out of her home.  She denies smoking, alcohol or recreational drug use.    Family history:  Her dad had a diagnosis of MS.    PHYSICAL EXAM: N/A (telephone visit)     ASSESSMENT/ PLAN:   1.   Primary progressive MS, overall stable on ocrelizumab: Initial symptoms in 2007 with intermittent limp in her left leg which gradually increased in frequency and severity.  She was diagnosed with MS in 2013 and was treated with Copaxone until 2017.  Established care with Dr. Shannon in 2017. She was given a diagnosis of primary progressive MS and was started on ocrelizumab.  Based on her previous CD19 cell count it takes about 11 months for her B cells to repopulate.  Her most recent ocrelizumab infusion was in August 2019.  She is due for her next infusion in July.  Our care coordinators will be in touch with the patient to get this scheduled.  Patient to follow-up with Dr. Shannon in 6 months.    2.  Vitamin D supplementation: Continue vitamin D 5000 international units daily.    3.  Fatigue: Patient reports her fatigue as mild to moderate.  Currently she is able to manage it with naps during the daytime.  I briefly discussed the options of medications like amantadine if her fatigue worsens in the future.    4. Please contact us with questions or concerns.     Harmony Egan CNP  [facility]    Phone call contact time:  Call Started at 12.35  Call Ended at 1.20

## 2020-06-01 NOTE — TELEPHONE ENCOUNTER
Prior Authorization Infusion/Clinic Administered Request    Location: [facility]   Diagnosis and ICD: Primary Progressive Multiple Sclerosis, G35  Drug/Therapy: Ocrevus 600mg IV x1 dose    Previously Tried and Failed Therapies: None    Date of provider note with supporting information: 11/26/19    Urgency (When is the patient scheduled?): ASAP please    Would you like to include any research articles? n/a        If yes please call [phone] for further instructions about sending that information

## 2020-06-08 NOTE — TELEPHONE ENCOUNTER
Ocrevus PA approved; I tried calling the patient, however, I couldn't get through; I will try calling her again tomorrow.    Tata Roberts, MS RN Care Coordinator

## 2020-06-09 NOTE — TELEPHONE ENCOUNTER
I called the patient and advised her that she can get scheduled for her next Ocrevus infusion for mid-July; The patient verbalized understanding and will call the [facility] infusion center to get scheduled.    Tata Roberts, MS RN Care Coordinator

## 2020-07-16 ENCOUNTER — INFUSION THERAPY VISIT (OUTPATIENT)
Dept: INFUSION THERAPY | Facility: CLINIC | Age: 60
End: 2020-07-16
Payer: COMMERCIAL

## 2020-07-16 VITALS
SYSTOLIC BLOOD PRESSURE: 99 MMHG | BODY MASS INDEX: 21.55 KG/M2 | DIASTOLIC BLOOD PRESSURE: 62 MMHG | TEMPERATURE: 98.8 F | WEIGHT: 129.5 LBS | HEART RATE: 73 BPM | OXYGEN SATURATION: 100 %

## 2020-07-16 DIAGNOSIS — G35 MULTIPLE SCLEROSIS (H): Primary | ICD-10-CM

## 2020-07-16 PROCEDURE — 96413 CHEMO IV INFUSION 1 HR: CPT | Performed by: NURSE PRACTITIONER

## 2020-07-16 PROCEDURE — 96375 TX/PRO/DX INJ NEW DRUG ADDON: CPT | Performed by: NURSE PRACTITIONER

## 2020-07-16 PROCEDURE — 99207 ZZC NO CHARGE NURSE ONLY: CPT

## 2020-07-16 PROCEDURE — 96415 CHEMO IV INFUSION ADDL HR: CPT | Performed by: NURSE PRACTITIONER

## 2020-07-16 RX ORDER — HEPARIN SODIUM (PORCINE) LOCK FLUSH IV SOLN 100 UNIT/ML 100 UNIT/ML
5 SOLUTION INTRAVENOUS
Status: CANCELLED | OUTPATIENT
Start: 2020-07-16

## 2020-07-16 RX ORDER — ACETAMINOPHEN 325 MG/1
650 TABLET ORAL ONCE
Status: CANCELLED
Start: 2020-07-16

## 2020-07-16 RX ORDER — HEPARIN SODIUM,PORCINE 10 UNIT/ML
5 VIAL (ML) INTRAVENOUS
Status: CANCELLED | OUTPATIENT
Start: 2020-07-16

## 2020-07-16 RX ORDER — METHYLPREDNISOLONE SODIUM SUCCINATE 125 MG/2ML
125 INJECTION, POWDER, LYOPHILIZED, FOR SOLUTION INTRAMUSCULAR; INTRAVENOUS ONCE
Status: COMPLETED | OUTPATIENT
Start: 2020-07-16 | End: 2020-07-16

## 2020-07-16 RX ORDER — METHYLPREDNISOLONE SODIUM SUCCINATE 125 MG/2ML
125 INJECTION, POWDER, LYOPHILIZED, FOR SOLUTION INTRAMUSCULAR; INTRAVENOUS ONCE
Status: CANCELLED
Start: 2020-07-16

## 2020-07-16 RX ORDER — DIPHENHYDRAMINE HCL 25 MG
50 CAPSULE ORAL ONCE
Status: CANCELLED
Start: 2020-07-16

## 2020-07-16 RX ORDER — ACETAMINOPHEN 325 MG/1
650 TABLET ORAL ONCE
Status: COMPLETED | OUTPATIENT
Start: 2020-07-16 | End: 2020-07-16

## 2020-07-16 RX ORDER — DIPHENHYDRAMINE HCL 25 MG
50 CAPSULE ORAL ONCE
Status: COMPLETED | OUTPATIENT
Start: 2020-07-16 | End: 2020-07-16

## 2020-07-16 RX ADMIN — Medication 50 MG: at 10:25

## 2020-07-16 RX ADMIN — METHYLPREDNISOLONE SODIUM SUCCINATE 125 MG: 125 INJECTION INTRAMUSCULAR; INTRAVENOUS at 10:46

## 2020-07-16 RX ADMIN — Medication 250 ML: at 10:43

## 2020-07-16 RX ADMIN — ACETAMINOPHEN 650 MG: 325 TABLET ORAL at 10:25

## 2020-07-16 ASSESSMENT — PAIN SCALES - GENERAL: PAINLEVEL: NO PAIN (0)

## 2020-07-16 NOTE — PROGRESS NOTES
Infusion Nursing Note:  Colletta Dwen Sorrell presents today for Ocrevus.    Patient seen by provider today: No   present during visit today: Not Applicable.    Note: Pt c/o chronic fatigue and decreased appetite, see flow sheet for assessment..    Intravenous Access:  Peripheral IV placed.    Treatment Conditions:  Biological Infusion Checklist:  ~~~ NOTE: If the patient answers yes to any of the questions below, hold the infusion and contact ordering provider or on-call provider.    1. Have you recently had an elevated temperature, fever, chills, productive cough, coughing for 3 weeks or longer or hemoptysis, abnormal vital signs, night sweats,  chest pain or have you noticed a decrease in your appetite, unexplained weight loss or fatigue? No  2. Do you have any open wounds or new incisions? No  3. Do you have any recent or upcoming hospitalizations, surgeries or dental procedures? No  4. Do you currently have or recently have had any signs of illness or infection or are you on any antibiotics? No  5. Have you had any new, sudden or worsening abdominal pain? No  6. Have you or anyone in your household received a live vaccination in the past 4 weeks? Please note:  No live vaccines while on biologic/chemotherapy until 6 months after the last treatment.  Patient can receive the flu vaccine (shot only) and the pneumovax.  It is optimal for the patient to get these vaccines mid cycle, but they can be given at any time as long as it is not on the day of the infusion. No  7. Have you recently been diagnosed with any new nervous system diseases (ie. Multiple sclerosis, Guillain [place], seizures, neurological changes) or cancer diagnosis? No  8. Are you on any form of radiation or chemotherapy? No  9. Are you pregnant or breast feeding or do you have plans of pregnancy in the future? No  10. Have you been having any signs of worsening depression or suicidal ideations?  (benlysta only) No  11. Have there been any  other new onset medical symptoms? No        Post Infusion Assessment:  Patient tolerated infusion without incident.  Patient refused to be observed for 60 minutes post Ocrevus per protocol, her ride with [employer] was here and waiting.  Blood return noted pre and post infusion.  Site patent and intact, free from redness, edema or discomfort.  No evidence of extravasations.  Access discontinued per protocol.       Discharge Plan:   Patient discharged in stable condition accompanied by: daughter.  Departure Mode: Wheelchair.  Pt will call to schedule next infusion per providers recommendation.    Dean Gasca RN

## 2020-10-06 DIAGNOSIS — G35 MULTIPLE SCLEROSIS (H): ICD-10-CM

## 2020-10-06 RX ORDER — OXYBUTYNIN CHLORIDE 15 MG/1
TABLET, EXTENDED RELEASE ORAL
Qty: 30 TABLET | Refills: 11 | Status: SHIPPED | OUTPATIENT
Start: 2020-10-06 | End: 2021-10-18

## 2020-10-06 NOTE — TELEPHONE ENCOUNTER
Received refill request for Oxybutynin from [facility]; Patient was last seen in June 2020 and has follow up appointment in December 2020 with Dr. Shannon. Refilled per [place] refill protocol.    Jyoti Lowe RN

## 2020-10-06 NOTE — TELEPHONE ENCOUNTER
Rx Authorization:    Requested Medication/ Dose: Oxybutynin ER 15MG TB24    Date last refill ordered: 9/26/19    Quantity ordered: 30 tabs    # refills: 11    Date of last clinic visit with ordering provider: 6/1/20    Date of next clinic visit with ordering provider: 12/1/20    All pertinent protocol data (lab date/result):     Include pertinent information from patients message:

## 2020-11-02 DIAGNOSIS — G35 MULTIPLE SCLEROSIS (H): ICD-10-CM

## 2020-11-03 NOTE — TELEPHONE ENCOUNTER
Received refill request for incontinence supplies from the patient's pharmacy; This has been pended to Dr. Shannon.    Tata Roberts, MS RN Care Coordinator

## 2020-11-04 RX ORDER — DIAPER,BRIEF,ADULT, DISPOSABLE
EACH MISCELLANEOUS
Qty: 36 EACH | Refills: 3 | Status: SHIPPED | OUTPATIENT
Start: 2020-11-04 | End: 2021-07-21

## 2020-11-11 NOTE — TELEPHONE ENCOUNTER
1.  Date/reason for appt:7/11/17, [place]  2.  Referring provider:YOLIS BEDOYA  3.  Call to patient (Yes / No - short description):No, referred   4.  Previous care at / records requested from:   [facility]- faxed cover sheet.    [facility]- faxed cover sheet   [facility]- faxed cover sheet.   5.  Other:     Former smoker

## 2020-12-01 ENCOUNTER — VIRTUAL VISIT (OUTPATIENT)
Dept: NEUROLOGY | Facility: CLINIC | Age: 60
End: 2020-12-01
Attending: PSYCHIATRY & NEUROLOGY
Payer: COMMERCIAL

## 2020-12-01 DIAGNOSIS — G35 MS (MULTIPLE SCLEROSIS) (H): Primary | ICD-10-CM

## 2020-12-01 PROCEDURE — 99213 OFFICE O/P EST LOW 20 MIN: CPT | Mod: TEL | Performed by: PSYCHIATRY & NEUROLOGY

## 2020-12-01 NOTE — PROGRESS NOTES
"Colletta Dwen Sorrell is a 60 year old female who is being evaluated via a billable telephone visit.      The patient has been notified of following:     \"This telephone visit will be conducted via a call between you and your physician/provider. We have found that certain health care needs can be provided without the need for a physical exam.  This service lets us provide the care you need with a short phone conversation.  If a prescription is necessary we can send it directly to your pharmacy.  If lab work is needed we can place an order for that and you can then stop by our lab to have the test done at a later time.    Telephone visits are billed at different rates depending on your insurance coverage. During this emergency period, for some insurers they may be billed the same as an in-person visit.  Please reach out to your insurance provider with any questions.    If during the course of the call the physician/provider feels a telephone visit is not appropriate, you will not be charged for this service.\"    Patient has given verbal consent for Telephone visit?  Yes    What phone number would you like to be contacted at? [phone]    How would you like to obtain your AVS? Mail a copy    Notes:  Colletta Sorrell is a 60 year old woman who I follow for primary progressive MS.  This was a phone visit for routine followup.    Colletta tells me she has been doing well, with no new neurologic symptoms.  She feels her walking is the same as it was one year ago.  She uses a cane or a walker at times, has not had any falls.  Her legs get tight, she has been seeing a chiropractor for \"nerve decompression\", which she thinks is helpful. Her left side is more affected by MS.  The arms and bladder are doing fine (on oxybutynin), she has moderate fatigue.  She takes a lot of supplements, including vitamins, alphalipoic acid, brandon tea.  Her last ocerlizumab infusion was in July, and she is getting it every 11 months.  She has " not had any significant new medical problems since I saw her last.    Past Medical History:   Diagnosis Date     Cubital tunnel syndrome      Meralgia paraesthetica      MS (multiple sclerosis) (H)      Multinodular thyroid      Neuropathy        Current Outpatient Medications:      Alpha-Lipoic Acid 300 MG CAPS, TAKE ONE CAPSULE BY MOUTH EVERY DAY, Disp: 30 capsule, Rfl: 11     Ascorbic Acid (VITAMIN C PO), Take 1.5 teaspoonful by mouth daily , Disp: , Rfl:      B Complex Vitamins (VITAMIN B COMPLEX PO), Take 1 tablet by mouth as needed , Disp: , Rfl:      CALCIUM-VITAMIN D PO, Take 1 tablet by mouth daily Vit d 7,0000 unit, Disp: , Rfl:      cholecalciferol (VITAMIN D3) 5000 units (125 mcg) capsule, TAKE ONE CAPSULE BY MOUTH EVERY DAY, Disp: 30 capsule, Rfl: 11     Cyanocobalamin (VITAMIN B 12 PO), Take 2,500 mcg by mouth daily, Disp: , Rfl:      Green Tea, Radha sinensis, (GREEN TEA PO), Liquid form, Disp: , Rfl:      HEMP OIL OR EXTRACT OR OTHER CBD CANNABINOID, NOT MEDICAL CANNABIS,, , Disp: , Rfl:      Incontinence Supply Disposable (DEPEND PANT SM/MED) MISC, USE 1-2 PER DAY, Disp: 36 each, Rfl: 3     MAGNESIUM OXIDE PO, Take 500 mg by mouth daily , Disp: , Rfl:      melatonin 3 MG tablet, Take 3 mg by mouth nightly as needed , Disp: , Rfl:      Multiple Vitamins-Minerals (MULTIVITAMIN ADULT) TABS, Take 1 tablet by mouth daily, Disp: 30 tablet, Rfl: 11     Naltrexone POWD, Take 4.5 mg by mouth At Bedtime, Disp: 1 Bottle, Rfl: 3     Ocrelizumab (OCREVUS IV), , Disp: , Rfl:      Omega-3 Fatty Acids (FISH OIL) 1200 MG capsule, Take 1,200 mg by mouth daily, Disp: , Rfl:      oxybutynin ER (DITROPAN XL) 15 MG 24 hr tablet, TAKE ONE TABLET BY MOUTH ONCE DAILY, Disp: 30 tablet, Rfl: 11     Probiotic Product (ACIDOPHILUS PROBIOTIC BLEND) CAPS, TAKE ONE CAPSULE BY MOUTH EVERY DAY, Disp: 30 capsule, Rfl: 11     ranitidine (ZANTAC) 150 MG tablet, Take 1 tablet (150 mg) by mouth 2 times daily, Disp: 60 tablet, Rfl:  "3     TURMERIC PO, , Disp: , Rfl:      Vitamin A 79045 units CAPS, , Disp: , Rfl:     Imp:  PPMS, stable on ocrelizumab.  We discussed Covid-19 in the context of MS and B cell depletion - I told her that it may impair the effectiveness of vaccines, including Covid-19 vaccine, but that I would recommend she get it anyway.  She says she probably is not interested in that.  She asked about a new MS pill that she read about that \"heals the prior damage\" - I told her no such thing exists.    Plan:  1.  Next ocrevus in June 2021  2.  Follow-up in 6 months with Harmony    Phone call duration: 20 minutes    Barrera Shannon MD      "

## 2020-12-12 NOTE — PROGRESS NOTES
Infusion Nursing Note:  Colletta Dwen Sorrell presents today for ocrevus.    Patient seen by provider today: No   present during visit today: Not Applicable.    Intravenous Access:  Peripheral IV placed.    Treatment Conditions:  Biological Infusion Checklist:  ~~~ NOTE: If the patient answers yes to any of the questions below, hold the infusion and contact ordering provider or on-call provider.    1. Have you recently had an elevated temperature, fever, chills, productive cough, coughing for 3 weeks or longer or hemoptysis, abnormal vital signs, night sweats,  chest pain or have you noticed a decrease in your appetite, unexplained weight loss or fatigue? No  2. Do you have any open wounds or new incisions? No  3. Do you have any recent or upcoming hospitalizations, surgeries or dental procedures? No  4. Do you currently have or recently have had any signs of illness or infection or are you on any antibiotics? No  5. Have you had any new, sudden or worsening abdominal pain? No  6. Have you or anyone in your household received a live vaccination in the past 4 weeks? Please note:  No live vaccines while on biologic/chemotherapy until 6 months after the last treatment.  Patient can receive the flu vaccine (shot only) and the pneumovax.  It is optimal for the patient to get these vaccines mid cycle, but they can be given at any time as long as it is not on the day of the infusion. No  7. Have you recently been diagnosed with any new nervous system diseases (ie. Multiple sclerosis, Guillain [place], seizures, neurological changes) or cancer diagnosis? No  8. Are you on any form of radiation or chemotherapy? No  9. Are you pregnant or breast feeding or do you have plans of pregnancy in the future? No  10. Have you been having any signs of worsening depression or suicidal ideations?  (benlysta only) No  11. Have there been any other new onset medical symptoms? No        Post Infusion Assessment:  Patient tolerated  Emergency Cesarian Delivery infusion without incident.  Patient observed for 40 minutes post ocrevus per protocol. Patient had pre-scheduled transportation for 3pm so could not stay for the whole hour.  Blood return noted pre and post infusion.  Site patent and intact, free from redness, edema or discomfort.  No evidence of extravasations.  Access discontinued per protocol.       Discharge Plan:   Patient discharged in stable condition accompanied by: self.  Departure Mode: Wheelchair and to meet transportation.  Patient states that Dr Shannon orders the ocrevus will be ordered based on B cell count.    Charleen Guillen RN

## 2021-03-04 ENCOUNTER — TELEPHONE (OUTPATIENT)
Dept: NEUROLOGY | Facility: CLINIC | Age: 61
End: 2021-03-04

## 2021-03-04 NOTE — TELEPHONE ENCOUNTER
I left [facility] for the patient with Dr. Shannon's input and directed her to the websites listed in Dr. Shanonn's note.    Tata Roberts, MS RN Care Coordinator

## 2021-03-04 NOTE — TELEPHONE ENCOUNTER
"I'm not exactly sure, but I probably discussed mastinib, which had some encouraging trial results in progressive MS.  I have no idea if, or when, it might be FDA approved (so don't know where the \"2 years\" came from).  She can see some information about it here    https://multiplesclerosisI-Market.[employer]/news-posts/2020/02/21/ab-science-announces-positive-top-line-phase-2b3-results-for-oral-masitinib-in-progressive-forms-of-multiple-sclerosis/    And here:    https://www.nationalmssociety.org/About-the-Society/News/Company-Announces-First-Results-from-Trial-of-Iris  "

## 2021-03-04 NOTE — TELEPHONE ENCOUNTER
Health Call Center    Phone Message    May a detailed message be left on voicemail: yes     Reason for Call: Other: Patient calling looking to speak with care team about last phone appointment with Dr. Shannon - patient states that Dr. Shannon mentioned something coming out in 2 years for MS and patient states that she forgot to ask about what that is and patient is wondering if she can get more information on this.     Please advise and call patient back at your earliest convenience     Action Taken: Other:  MS    Travel Screening: Not Applicable

## 2021-04-06 DIAGNOSIS — G35 MULTIPLE SCLEROSIS (H): ICD-10-CM

## 2021-04-07 RX ORDER — ALPHA LIPOIC ACID 300 MG
CAPSULE ORAL
Qty: 30 CAPSULE | Refills: 11 | Status: SHIPPED | OUTPATIENT
Start: 2021-04-07

## 2021-04-07 NOTE — TELEPHONE ENCOUNTER
Patient requesting refill of their Alpha-Lipoic Acid; Patient was last seen in December and has no follow up appointment scheduled. Pended rx to Dr. Shannon for signature and will send electronically to the pharmacy once signed.    Jyoti Lowe RN

## 2021-04-21 DIAGNOSIS — G35 MULTIPLE SCLEROSIS (H): Primary | ICD-10-CM

## 2021-04-21 RX ORDER — SELENIUM 50 MCG
TABLET ORAL
Qty: 30 CAPSULE | Refills: 10 | Status: SHIPPED | OUTPATIENT
Start: 2021-04-21

## 2021-04-21 NOTE — TELEPHONE ENCOUNTER
Received refill request for lactobacillus from [facility]; Patient was last seen in December and has no follow up appointment scheduled. Refilled per MS refill protocol.    Jyoti Lowe RN

## 2021-05-03 ENCOUNTER — TELEPHONE (OUTPATIENT)
Dept: NEUROLOGY | Facility: CLINIC | Age: 61
End: 2021-05-03

## 2021-05-03 ENCOUNTER — NURSE TRIAGE (OUTPATIENT)
Dept: NURSING | Facility: CLINIC | Age: 61
End: 2021-05-03

## 2021-05-03 RX ORDER — HEPARIN SODIUM,PORCINE 10 UNIT/ML
5 VIAL (ML) INTRAVENOUS
Status: CANCELLED | OUTPATIENT
Start: 2021-06-01

## 2021-05-03 RX ORDER — METHYLPREDNISOLONE SODIUM SUCCINATE 125 MG/2ML
125 INJECTION, POWDER, LYOPHILIZED, FOR SOLUTION INTRAMUSCULAR; INTRAVENOUS ONCE
Status: CANCELLED | OUTPATIENT
Start: 2021-06-01

## 2021-05-03 RX ORDER — HEPARIN SODIUM (PORCINE) LOCK FLUSH IV SOLN 100 UNIT/ML 100 UNIT/ML
5 SOLUTION INTRAVENOUS
Status: CANCELLED | OUTPATIENT
Start: 2021-06-01

## 2021-05-03 RX ORDER — DIPHENHYDRAMINE HCL 25 MG
50 CAPSULE ORAL ONCE
Status: CANCELLED | OUTPATIENT
Start: 2021-06-01

## 2021-05-03 RX ORDER — ACETAMINOPHEN 325 MG/1
650 TABLET ORAL ONCE
Status: CANCELLED | OUTPATIENT
Start: 2021-06-01

## 2021-05-03 NOTE — TELEPHONE ENCOUNTER
Patient is due for her next Ocrevus infusion based on her every 11 month dosing schedule; New Ocrevus therapy plan orders placed and routed to Dr. Shannon for review and signature; I will send a PA request once the orders have been signed; I called the patient and let her know I was starting this process; I asked if she has received or was planning to get the COVID vaccine, which she tells me she is not going to get the vaccine; Will update Dr. Shannon with regards to that as well.    Tata Roberts, MS RN Care Coordinator

## 2021-05-03 NOTE — TELEPHONE ENCOUNTER
Prior Authorization Infusion/Clinic Administered Request    Location: [facility]   Diagnosis and ICD: Primary Progressive Multiple Sclerosis, G35  Drug/Therapy: Ocrevus 600mg IV x1 dose    Previously Tried and Failed Therapies: None - There is no other FDA approve medication for PPMS, please approve.    Date of provider note with supporting information: 12/1/20    Urgency (When is the patient scheduled?): ASAP please    Would you like to include any research articles? n/a        If yes please call [phone] for further instructions about sending that information

## 2021-05-03 NOTE — TELEPHONE ENCOUNTER
Ocrevus orders signed by Dr. Shannon; I will send a PA request to our infusion finance team.    Tata Roberts, MS RN Care Coordinator

## 2021-05-03 NOTE — TELEPHONE ENCOUNTER
Asking about a machine that treats neuropathy. This is something she's recently hear of and wonders where can she go for this.    I suggested she speak to her neurologist. I transferred Colletta to  of M  and instructed she tell them she'd like to speak to someone from Dr Barrera Shannon's office about seeing him and asking about this machine.    COVID 19 Nurse Triage Plan/Patient Instructions    Please be aware that novel coronavirus (COVID-19) may be circulating in the community. If you develop symptoms such as fever, cough, or SOB or if you have concerns about the presence of another infection including coronavirus (COVID-19), please contact your health care provider or visit [email or web address].     Disposition/Instructions    Virtual Visit with provider recommended. Reference Visit Selection Guide.    Thank you for taking steps to prevent the spread of this virus.  o Limit your contact with others.  o Wear a simple mask to cover your cough.  o Wash your hands well and often.    Resources    [facility]: About COVID-19: [email or web address]    CDC: What to Do If You're Sick: www.cdc.gov/coronavirus/2019-ncov/about/steps-when-sick.html    CDC: Ending Home Isolation: www.cdc.gov/coronavirus/2019-ncov/hcp/disposition-in-home-patients.html     CDC: Caring for Someone: www.cdc.gov/coronavirus/2019-ncov/if-you-are-sick/care-for-someone.html     [facility]: Interim Guidance for Hospital Discharge to Home: www.health.[facility]/diseases/coronavirus/hcp/hospdischarge.pdf    [facility] clinical trials (COVID-19 research studies): clinicalaffairs.[facility]-clinical-trials     Below are the COVID-19 hotlines at the [facility] ([facility]). Interpreters are available.   o For health questions: Call 756-698-3465 or 1-639.311.2559 (7 a.m. to 7 p.m.)  o For questions about schools and childcare: Call 649-923-9906 or 1-849.969.1211 (7 a.m. to 7 p.m.)     Additional  Information    Nursing judgment    Protocols used: NO PROTOCOL AVAILABLE - INFORMATION ONLY-A-CLAIR MURRIETA RN [place] [facility]

## 2021-05-04 NOTE — TELEPHONE ENCOUNTER
Patient okay to get scheduled per infusion finance team; I called Colletta and let her know this; She will call to schedule.    Tata Roberts MS RN Care Coordinator

## 2021-06-14 ENCOUNTER — INFUSION THERAPY VISIT (OUTPATIENT)
Dept: INFUSION THERAPY | Facility: CLINIC | Age: 61
End: 2021-06-14
Payer: COMMERCIAL

## 2021-06-14 VITALS
DIASTOLIC BLOOD PRESSURE: 79 MMHG | RESPIRATION RATE: 14 BRPM | HEART RATE: 88 BPM | WEIGHT: 132 LBS | BODY MASS INDEX: 21.97 KG/M2 | OXYGEN SATURATION: 97 % | TEMPERATURE: 98.4 F | SYSTOLIC BLOOD PRESSURE: 132 MMHG

## 2021-06-14 DIAGNOSIS — G35 MULTIPLE SCLEROSIS (H): Primary | ICD-10-CM

## 2021-06-14 PROCEDURE — 96415 CHEMO IV INFUSION ADDL HR: CPT | Performed by: NURSE PRACTITIONER

## 2021-06-14 PROCEDURE — 99207 PR NO CHARGE LOS: CPT

## 2021-06-14 PROCEDURE — 96375 TX/PRO/DX INJ NEW DRUG ADDON: CPT | Performed by: NURSE PRACTITIONER

## 2021-06-14 PROCEDURE — 96413 CHEMO IV INFUSION 1 HR: CPT | Performed by: NURSE PRACTITIONER

## 2021-06-14 RX ORDER — ACETAMINOPHEN 325 MG/1
650 TABLET ORAL ONCE
Status: COMPLETED | OUTPATIENT
Start: 2021-06-14 | End: 2021-06-14

## 2021-06-14 RX ORDER — HEPARIN SODIUM (PORCINE) LOCK FLUSH IV SOLN 100 UNIT/ML 100 UNIT/ML
5 SOLUTION INTRAVENOUS
Status: CANCELLED | OUTPATIENT
Start: 2021-12-11

## 2021-06-14 RX ORDER — HEPARIN SODIUM,PORCINE 10 UNIT/ML
5 VIAL (ML) INTRAVENOUS
Status: CANCELLED | OUTPATIENT
Start: 2021-12-11

## 2021-06-14 RX ORDER — METHYLPREDNISOLONE SODIUM SUCCINATE 125 MG/2ML
125 INJECTION, POWDER, LYOPHILIZED, FOR SOLUTION INTRAMUSCULAR; INTRAVENOUS ONCE
Status: CANCELLED | OUTPATIENT
Start: 2021-12-11

## 2021-06-14 RX ORDER — DIPHENHYDRAMINE HCL 25 MG
50 CAPSULE ORAL ONCE
Status: COMPLETED | OUTPATIENT
Start: 2021-06-14 | End: 2021-06-14

## 2021-06-14 RX ORDER — DIPHENHYDRAMINE HCL 25 MG
50 CAPSULE ORAL ONCE
Status: CANCELLED | OUTPATIENT
Start: 2021-12-11

## 2021-06-14 RX ORDER — METHYLPREDNISOLONE SODIUM SUCCINATE 125 MG/2ML
125 INJECTION, POWDER, LYOPHILIZED, FOR SOLUTION INTRAMUSCULAR; INTRAVENOUS ONCE
Status: COMPLETED | OUTPATIENT
Start: 2021-06-14 | End: 2021-06-14

## 2021-06-14 RX ORDER — ACETAMINOPHEN 325 MG/1
650 TABLET ORAL ONCE
Status: CANCELLED | OUTPATIENT
Start: 2021-12-11

## 2021-06-14 RX ADMIN — Medication 250 ML: at 10:32

## 2021-06-14 RX ADMIN — Medication 50 MG: at 10:40

## 2021-06-14 RX ADMIN — METHYLPREDNISOLONE SODIUM SUCCINATE 125 MG: 125 INJECTION INTRAMUSCULAR; INTRAVENOUS at 10:42

## 2021-06-14 RX ADMIN — ACETAMINOPHEN 650 MG: 325 TABLET ORAL at 10:40

## 2021-06-14 NOTE — PROGRESS NOTES
Infusion Nursing Note:  Colletta Dwen Sorrell presents today for Rapid Ocrevus.    Patient seen by provider today: No   present during visit today: Not Applicable.    Note: Patient requests to try Rapid Ocrevus this infusion.     Patient was provided the call light button and advised to page a nurse for any abnormalities or change of normal.    Intravenous Access:  Peripheral IV placed.    Treatment Conditions:  Biological Infusion Checklist:  ~~~ NOTE: If the patient answers yes to any of the questions below, hold the infusion and contact ordering provider or on-call provider.    1. Have you recently had an elevated temperature, fever, chills, productive cough, coughing for 3 weeks or longer or hemoptysis, abnormal vital signs, night sweats,  chest pain or have you noticed a decrease in your appetite, unexplained weight loss or fatigue? No  2. Do you have any open wounds or new incisions? No  3. Do you have any recent or upcoming hospitalizations, surgeries or dental procedures? No  4. Do you currently have or recently have had any signs of illness or infection or are you on any antibiotics? No  5. Have you had any new, sudden or worsening abdominal pain? No  6. Have you or anyone in your household received a live vaccination in the past 4 weeks? Please note:  No live vaccines while on biologic/chemotherapy until 6 months after the last treatment.  Patient can receive the flu vaccine (shot only) and the pneumovax.  It is optimal for the patient to get these vaccines mid cycle, but they can be given at any time as long as it is not on the day of the infusion. No  7. Have you recently been diagnosed with any new nervous system diseases (ie. Multiple sclerosis, Guillain L[place], seizures, neurological changes) or cancer diagnosis? No  8. Are you on any form of radiation or chemotherapy? No  9. Are you pregnant or breast feeding or do you have plans of pregnancy in the future? No  10. Have you been having any  signs of worsening depression or suicidal ideations?  (benlysta only) No  11. Have there been any other new onset medical symptoms? No        Post Infusion Assessment:  Patient tolerated infusion without incident.  Site patent and intact, free from redness, edema or discomfort.  No evidence of extravasations.  Access discontinued per protocol.       Discharge Plan:   Patient discharged in stable condition accompanied by: self.  Departure Mode: Ambulatory.      Chandrika Douglas RN

## 2021-06-21 ENCOUNTER — OFFICE VISIT (OUTPATIENT)
Dept: NEUROLOGY | Facility: CLINIC | Age: 61
End: 2021-06-21
Attending: PSYCHIATRY & NEUROLOGY
Payer: COMMERCIAL

## 2021-06-21 VITALS
OXYGEN SATURATION: 100 % | BODY MASS INDEX: 23.3 KG/M2 | SYSTOLIC BLOOD PRESSURE: 112 MMHG | WEIGHT: 140 LBS | DIASTOLIC BLOOD PRESSURE: 73 MMHG | HEART RATE: 60 BPM

## 2021-06-21 DIAGNOSIS — G35 MULTIPLE SCLEROSIS (H): Primary | ICD-10-CM

## 2021-06-21 DIAGNOSIS — E55.9 VITAMIN D DEFICIENCY: ICD-10-CM

## 2021-06-21 PROCEDURE — G0463 HOSPITAL OUTPT CLINIC VISIT: HCPCS

## 2021-06-21 PROCEDURE — 99214 OFFICE O/P EST MOD 30 MIN: CPT | Performed by: NURSE PRACTITIONER

## 2021-06-21 ASSESSMENT — PAIN SCALES - GENERAL: PAINLEVEL: NO PAIN (0)

## 2021-06-21 NOTE — PATIENT INSTRUCTIONS
1. Continue vit D daily.     2. MRI as scheduled on 6/23. Will contact you with results.     3. Follow-up with Dr. Shannon in 6 months.

## 2021-06-21 NOTE — LETTER
6/21/2021       RE: Colletta Dwen Sorrell  [address]  [place] [address]     Dear Colleague,    Thank you for referring your patient, Colletta Dwen Sorrell, to the [facility] MULTIPLE S[facility] CLIN[facility] [place] at [facility]. Please see a copy of my visit note below.    [facility] OUTPATIENT MULTIPLE SCLEROSIS CLINIC VISIT NOTE    CHIEF COMPLAINT: Colletta is a 61 year old female who presents to the clinic today for regularly scheduled follow up of Multiple Sclerosis. She was most recently seen by Dr. Shannon on 12/1/2020. She presents to the evaluation with her daughter.      HISTORY OF PRESENT ILLNESS: Copied forward from my previous note. Initial symptoms in 2007 with intermittent limp in her left leg which gradually increased in frequency and severity.  She was diagnosed with MS in 2013 and was treated with Copaxone until 2017.  Established care with Dr. Shannon in 2017. She was given a diagnosis of primary progressive MS and was started on ocrelizumab.    INTERVAL HISTORY SINCE LAST VISIT: Overall doing well since her last visit. No recent hospitalization or illness. Patient denies any new changes in vision, balance, strength or sensation suggestive of new relapse of multiple sclerosis since she was last seen here.    For her diagnosis of primary progressive MS, she is on ocrelizumab every 11 months.  Most recent infusion was on 6/14/2021.  No major side effects from these infusions.    She reports bilateral leg weakness and uses a cane for ambulation.  For longer distances, she uses a wheelchair.  She recently moved to a single level house.  Denies any recent falls.  For neuropathy pain she is currently in a 12 weeks exercise program through her chiropractor's office.    Reports mild to moderate fatigue and usually manageable with naps during the daytime.  She reports mild urinary urgency, frequency and it is currently on  "oxybutynin 15 mg daily.    She remains Covid free.  She is not interested in pursuing Covid vaccine because of \"lack of data\".    Vit D: She uses 5000 international units daily.    PHYSICAL EXAMINATION:   VITAL SIGNS: /73 (BP Location: Left arm, Patient Position: Chair, Cuff Size: Adult Regular)   Pulse 60   Wt 63.5 kg (140 lb)   SpO2 100%   BMI 23.30 kg/m    MENTAL STATUS: Alert,awake and  oriented times four.   CRANIAL NERVES: Visual fields are full to confrontation. The pupils are equal, round and react to light and there is no Yohan Becca pupil.  Extraocular movements are intact with no internuclear ophthalmoplegia. No nystagmus. Facial strength and sensation are normal. Hearing is  normal.   POWER: Strength is as follows in the following muscles/groups (Right/Left,MRC scale): Shoulder abduction 5/5, elbow flexion 5/5, elbow extension 5/5, wrist extension 5/5, digit extension 4+/4+, digit flexion 4+/4+, Hip flexion 5/4+, knee extension 5/4-, knee flexion 5/4-, foot dorsiflexion 5/3.   SENSORY: Overall intact.  REFLEXES: Reflexes are normal, present and symmetric at biceps, triceps, brachioradialis, knees and ankles.   MOTOR/CEREBELLAR: There are no tremors, myoclonus or other abnormal movements. Tone is slightly increased in her left lower extremity.  There is no appendicular ataxia on finger-to-nose testing.  Toe tapping slow and clumsy left > right.  There is no pronator drift.   GAIT: Wide-based gait and she ambulates with a cane.  She has a spastic, ataxic, left hemiparetic gait.       MEDICAL DECISION MAKIN.  Primary progressive multiple sclerosis, overall stable on ocrelizumab: Initial symptoms in  with intermittent limp in her left leg which gradually increased in frequency and severity.  She was diagnosed with MS in  and was treated with Copaxone until 2017.  Established care with Dr. Shannon in 2017. She was given a diagnosis of primary progressive MS and was started on " ocrelizumab.    Most recent infusion on 6/14/2021 and she is on every 11-month schedule based on her previous CD19 counts.  She is scheduled for follow-up imaging on Wednesday.  I will follow-up with the patient with the results.  Patient to follow-up with Dr. Shannon in 6 months.    2.  Vitamin D supplementation: Continue 5000 international units daily.    3.  Covid vaccination: Patient is not interested in pursuing Covid vaccination now.  We briefly discussed the increased risk of complications from Covid if she were to get infected in regards to her MS and being on immunosuppressive medication.  She agrees understanding.    4. Please contact us with questions or concerns.     Harmony Egan CNP  [facility]      32 minutes spent on the date of the encounter on chart review, history and examination, documentation and further activities as noted above        Again, thank you for allowing me to participate in the care of your patient.      Sincerely,    OSITO Wynn CNP

## 2021-06-22 NOTE — PROGRESS NOTES
"[facility] OUTPATIENT MULTIPLE SCLEROSIS CLINIC VISIT NOTE    CHIEF COMPLAINT: Colletta is a 61 year old female who presents to the clinic today for regularly scheduled follow up of Multiple Sclerosis. She was most recently seen by Dr. Shannon on 12/1/2020. She presents to the evaluation with her daughter.      HISTORY OF PRESENT ILLNESS: Copied forward from my previous note. Initial symptoms in 2007 with intermittent limp in her left leg which gradually increased in frequency and severity.  She was diagnosed with MS in 2013 and was treated with Copaxone until 2017.  Established care with Dr. Shannon in 2017. She was given a diagnosis of primary progressive MS and was started on ocrelizumab.    INTERVAL HISTORY SINCE LAST VISIT: Overall doing well since her last visit. No recent hospitalization or illness. Patient denies any new changes in vision, balance, strength or sensation suggestive of new relapse of multiple sclerosis since she was last seen here.    For her diagnosis of primary progressive MS, she is on ocrelizumab every 11 months.  Most recent infusion was on 6/14/2021.  No major side effects from these infusions.    She reports bilateral leg weakness and uses a cane for ambulation.  For longer distances, she uses a wheelchair.  She recently moved to a single level house.  Denies any recent falls.  For neuropathy pain she is currently in a 12 weeks exercise program through her chiropractor's office.    Reports mild to moderate fatigue and usually manageable with naps during the daytime.  She reports mild urinary urgency, frequency and it is currently on oxybutynin 15 mg daily.    She remains Covid free.  She is not interested in pursuing Covid vaccine because of \"lack of data\".    Vit D: She uses 5000 international units daily.    PHYSICAL EXAMINATION:   VITAL SIGNS: /73 (BP Location: Left arm, Patient Position: Chair, Cuff Size: Adult Regular)   Pulse 60   Wt 63.5 kg (140 lb)   " SpO2 100%   BMI 23.30 kg/m    MENTAL STATUS: Alert,awake and  oriented times four.   CRANIAL NERVES: Visual fields are full to confrontation. The pupils are equal, round and react to light and there is no Yohan Becca pupil.  Extraocular movements are intact with no internuclear ophthalmoplegia. No nystagmus. Facial strength and sensation are normal. Hearing is  normal.   POWER: Strength is as follows in the following muscles/groups (Right/Left,MRC scale): Shoulder abduction 5/5, elbow flexion 5/5, elbow extension 5/5, wrist extension 5/5, digit extension 4+/4+, digit flexion 4+/4+, Hip flexion 5/4+, knee extension 5/4-, knee flexion 5/4-, foot dorsiflexion 5/3.   SENSORY: Overall intact.  REFLEXES: Reflexes are normal, present and symmetric at biceps, triceps, brachioradialis, knees and ankles.   MOTOR/CEREBELLAR: There are no tremors, myoclonus or other abnormal movements. Tone is slightly increased in her left lower extremity.  There is no appendicular ataxia on finger-to-nose testing.  Toe tapping slow and clumsy left > right.  There is no pronator drift.   GAIT: Wide-based gait and she ambulates with a cane.  She has a spastic, ataxic, left hemiparetic gait.       MEDICAL DECISION MAKIN.  Primary progressive multiple sclerosis, overall stable on ocrelizumab: Initial symptoms in  with intermittent limp in her left leg which gradually increased in frequency and severity.  She was diagnosed with MS in  and was treated with Copaxone until 2017.  Established care with Dr. Shannon in 2017. She was given a diagnosis of primary progressive MS and was started on ocrelizumab.    Most recent infusion on 2021 and she is on every 11-month schedule based on her previous CD19 counts.  She is scheduled for follow-up imaging on Wednesday.  I will follow-up with the patient with the results.  Patient to follow-up with Dr. Shannon in 6 months.    2.  Vitamin D supplementation: Continue 5000 international  units daily.    3.  Covid vaccination: Patient is not interested in pursuing Covid vaccination now.  We briefly discussed the increased risk of complications from Covid if she were to get infected in regards to her MS and being on immunosuppressive medication.  She agrees understanding.    4. Please contact us with questions or concerns.     Harmony Egan CNP  [facility] Neurology      32 minutes spent on the date of the encounter on chart review, history and examination, documentation and further activities as noted above

## 2021-06-23 ENCOUNTER — ANCILLARY PROCEDURE (OUTPATIENT)
Dept: MRI IMAGING | Facility: CLINIC | Age: 61
End: 2021-06-23
Attending: PSYCHIATRY & NEUROLOGY
Payer: COMMERCIAL

## 2021-06-23 DIAGNOSIS — G35 MULTIPLE SCLEROSIS (H): ICD-10-CM

## 2021-06-23 PROCEDURE — A9585 GADOBUTROL INJECTION: HCPCS | Mod: JW | Performed by: RADIOLOGY

## 2021-06-23 PROCEDURE — 70553 MRI BRAIN STEM W/O & W/DYE: CPT | Mod: GC | Performed by: RADIOLOGY

## 2021-06-23 PROCEDURE — 72156 MRI NECK SPINE W/O & W/DYE: CPT | Mod: GC | Performed by: RADIOLOGY

## 2021-06-23 RX ORDER — GADOBUTROL 604.72 MG/ML
7.5 INJECTION INTRAVENOUS ONCE
Status: COMPLETED | OUTPATIENT
Start: 2021-06-23 | End: 2021-06-23

## 2021-06-23 RX ADMIN — GADOBUTROL 6.5 ML: 604.72 INJECTION INTRAVENOUS at 15:23

## 2021-06-24 ENCOUNTER — TELEPHONE (OUTPATIENT)
Dept: NEUROLOGY | Facility: CLINIC | Age: 61
End: 2021-06-24

## 2021-06-24 DIAGNOSIS — G35 MULTIPLE SCLEROSIS (H): Primary | ICD-10-CM

## 2021-06-24 NOTE — TELEPHONE ENCOUNTER
I called and discussed the results of her recent MRIs. Will also check with Dr. Shannon to see if he has any additional recommendations.       6/23/2021 MRI Brain:  Impression:   There are greater than 20 foci of T2-hyperintensity within the  cerebral white matter consistent with the clinical suspicion of  demyelinating disease. There is likely a new T2 hyperintense lesion in  the right hemicord at the craniocervical junction since 2014. No  abnormal intracranial enhancement to suggest active demyelination.     6/23/2021 MRI C spine:    Impression:   1. Grossly stable T2 hyperintense foci within the cervical spinal cord  compared to prior examination dated 11/1/2017. No abnormal contrast  enhancement within the cervical spinal cord to suggest active  demyelination.  2. Multilevel cervical spondylosis, most pronounced at 5-6 with severe  left neural foraminal stenosis, progressed since 2017..

## 2021-06-29 RX ORDER — METHYLPREDNISOLONE 4 MG
TABLET, DOSE PACK ORAL
Qty: 21 TABLET | Refills: 0 | Status: SHIPPED | OUTPATIENT
Start: 2021-06-29 | End: 2024-05-30

## 2021-06-29 NOTE — TELEPHONE ENCOUNTER
Discussed MRI results with Dr. Shannon. Due to ongoing sensory symptoms in her neck and arms, he recommends Medrol dosepak. If symptoms persist, EMG/ NCS to confirm radiculopathy and pain clinic referral for injections.     I called the patient and updated her of this. Prescription for Medrol dosepak sent to  Mail order pharmacy per patient request.

## 2021-07-11 ENCOUNTER — HEALTH MAINTENANCE LETTER (OUTPATIENT)
Age: 61
End: 2021-07-11

## 2021-07-19 DIAGNOSIS — G35 MULTIPLE SCLEROSIS (H): ICD-10-CM

## 2021-08-04 ENCOUNTER — TELEPHONE (OUTPATIENT)
Dept: NEUROLOGY | Facility: CLINIC | Age: 61
End: 2021-08-04

## 2021-08-04 DIAGNOSIS — G35 MULTIPLE SCLEROSIS (H): ICD-10-CM

## 2021-08-04 DIAGNOSIS — R20.0 NUMBNESS AND TINGLING OF BOTH UPPER EXTREMITIES: Primary | ICD-10-CM

## 2021-08-04 DIAGNOSIS — R20.2 NUMBNESS AND TINGLING OF BOTH UPPER EXTREMITIES: Primary | ICD-10-CM

## 2021-08-04 NOTE — TELEPHONE ENCOUNTER
[facility] Call Center    Phone Message    May a detailed message be left on voicemail: yes     Reason for Call: Order(s): Other:   Reason for requested: EMG  Date needed: ASAP   Provider name: Dr. Shannon    Pt is requesting Dr. Shannon to place order for EMG.      Action Taken: Message routed to:  [facility] ([facility] Neurology    Travel Screening: Not Applicable

## 2021-08-12 ENCOUNTER — OFFICE VISIT (OUTPATIENT)
Dept: NEUROLOGY | Facility: CLINIC | Age: 61
End: 2021-08-12
Payer: COMMERCIAL

## 2021-08-12 DIAGNOSIS — M79.601 PAIN IN BOTH UPPER EXTREMITIES: Primary | ICD-10-CM

## 2021-08-12 DIAGNOSIS — M79.602 PAIN IN BOTH UPPER EXTREMITIES: Primary | ICD-10-CM

## 2021-08-12 PROCEDURE — 95886 MUSC TEST DONE W/N TEST COMP: CPT | Performed by: PSYCHIATRY & NEUROLOGY

## 2021-08-12 PROCEDURE — 95885 MUSC TST DONE W/NERV TST LIM: CPT | Mod: 59 | Performed by: PSYCHIATRY & NEUROLOGY

## 2021-08-12 PROCEDURE — 95913 NRV CNDJ TEST 13/> STUDIES: CPT | Performed by: PSYCHIATRY & NEUROLOGY

## 2021-08-12 NOTE — LETTER
8/12/2021       RE: Colletta Dwen Sorrell  [address]  [place] [address]     Dear Colleague,    Thank you for referring your patient, Colletta Dwen Sorrell, to the [facility] EMG CLINIC [place] at [facility]. Please see a copy of my visit note below.      [facility]  Electrodiagnostic Laboratory    Nerve Conduction & EMG Report          Patient:       Colletta Sorrell  Patient ID:    [id number]  Gender:        Female  YOB: 1960  Age:           61 Years 6 Months        History & Examination: The patient is a 61-year-old female with longstanding multiple sclerosis.  She has had pain in both arms.  We are asked to evaluate for a possible cervical radiculopathy.  The patient is referred by Dr. Barrera Shannon.      Techniques: Motor and sensory conduction studies were done with surface recording electrodes. EMG was done with a concentric needle electrode.        Results: The median antidromic sensory nerve action potentials are normal bilaterally.  The ulnar antidromic sensory nerve action potentials are normal bilaterally.  The left superficial radial sensory nerve action potential is normal.  Median and ulnar sensory distal latencies were compared using the palmar sensory technique.  There was no disproportionate slowing of the median distal latency on the right or left.  Median motor conduction studies are normal bilaterally.  Ulnar motor conduction studies revealed motor conduction slowing localized to the elbows bilaterally..  Median and ulnar distal motor conduction was compared by stimulating the median nerves and recording from the lumbricals and stimulating the ulnar nerves and recording from the 2nd palmar interossei at equal distances.  There was no disproportionate slowing of distal median motor conduction.  Needle exam of both arms revealed only long-duration motor unit potentials with reduced recruitment in  left ulnar innervated muscles.  All other muscles were normal.      Interpretation: The EMG is abnormal.  There is EMG evidence for bilateral ulnar neuropathies at the elbow, worse on the left than the right.  There is no EMG evidence for a cervical radiculopathy on the right or left.        EMG Physician: Remigio Arteaga MD         Sensory NCS      Nerve / Sites Rec. Site Onset Peak Ref. NP Amp Ref. PP Amp Dist Isac Ref. Temp     ms ms ms  V  V  V cm m/s m/s  C   L MEDIAN - Dig II Anti      Wrist Dig II 2.45 3.33  35.6 10.0 63.2 14 57.2 48.0 30.2   R MEDIAN - Dig II Anti      Wrist Dig II 2.45 3.13  30.3 10.0 59.4 14 57.2 48.0 31.6   L ULNAR - Dig V Anti      Wrist Dig V 2.60 3.65  12.6 8.0 14.2 12.5 48.0 48.0 29.9   R ULNAR - Dig V Anti      Wrist Dig V 2.29 3.18  15.2 8.0 34.3 12.5 54.5 48.0 31.7   L RADIAL - Snuff      Forearm Snuff 2.03 2.60  57.6 15.0 66.4 12.5 61.5 48.0 30.2   L MEDIAN - Ulnar - Palmar      Median Wrist 1.56 2.03 2.40 84.9  112.6 8 51.2  30.2      Ulnar Wrist 1.67 2.29 2.40 26.8  16.1 8 48.0  30.3   R MEDIAN - Ulnar - Palmar      Median Wrist 1.67 2.19 2.40 54.7  73.2 8 48.0  31.7      Ulnar Wrist 1.56 2.03 2.40 13.1  13.2 8 51.2  31.7                         Motor NCS      Nerve / Sites Rec. Site Lat Ref. Amp Ref. Rel Amp Dist Isac Ref. Dur. Area Temp.     ms ms mV mV % cm m/s m/s ms %  C   L MEDIAN - APB      Wrist APB 3.44 4.40 10.2 5.0 100 8   8.28 100 30.8      Elbow APB 7.81  10.0  97.9 23 52.6 48.0 9.17 129 30.6   R MEDIAN - APB      Wrist APB 3.44 4.40 11.4 5.0 100 8   6.98 100 31.6      Elbow APB 7.29  10.7  94.1 22 57.1 48.0 7.60 95 31.7   L ULNAR - ADM      Wrist ADM 3.33 3.50 10.3 5.0 100 8   6.93 100 29.3      B.Elbow ADM 6.82  8.1  78.6 18 51.6 48.0 7.55 81.5 29.2      A.Elbow ADM 9.95  7.1  68.9 8 25.6 48.0 6.98 79.3 29.2   R ULNAR - ADM      Wrist ADM 2.76 3.50 11.4 5.0 100 8   6.61 100 31.7      B.Elbow ADM 6.35  8.3  72.8 19 52.9 48.0 7.03 93.1 31.6      A.Elbow ADM 8.75   7.0  60.8 8 33.4 48.0 7.19 77.1 31.6   L MEDIAN - II Lumb      Median II Lumb 3.75  0.7  100 10   7.24 100 33.5      Ulnar Palm Int 3.75  3.7  532 10   7.08 240 33.8   R MEDIAN - II Lumb      Median II Lumb 3.65  1.1  100 10   6.82 100 31.7      Ulnar Palm Int 3.23  4.7  432 10   6.25 281 31.7   L ULNAR - FDI      Wrist FDI 3.75  11.3  100    7.55 100 29.9      B.Elbow FDI 7.50  9.3  82.1 18 48.0  7.81 439 29.3      A.Elbow FDI 9.95  8.9  78.8 8 32.7  8.18 387 29.3   R ULNAR - FDI      Wrist FDI 3.70  16.4  100    5.26 100 31.7      B.Elbow FDI 7.19  16.5  101 20 57.3  5.47 101 31.7      A.Elbow FDI 9.43  15.2  92.8 8 35.7  5.94 92.3 31.7       EMG Summary Table     Spontaneous MUAP Recruitment    IA Fib/PSW Fasc H.F. Amp Dur. PPP Pattern   L. FIRST D INTEROSS N None None None 1+ 1+ N Normal   L. EXT INDICIS N None None None N N N Normal   L. FLEX CARPI ULN N None None None 1+ 1+ N Normal   L. PRON TERES N None None None N N N Normal   L. BICEPS N None None None N N N Normal   L. TRICEPS N None None None N N N Normal   R. FIRST D INTEROSS N None None None N N N Normal   R. BICEPS N None None None N N N Normal   R. TRICEPS N None None None N N N Normal                                            Again, thank you for allowing me to participate in the care of your patient.      Sincerely,    Remigio Arteaga MD

## 2021-08-12 NOTE — PROGRESS NOTES
[facility]  Electrodiagnostic Laboratory    Nerve Conduction & EMG Report          Patient:       Colletta Sorrell  Patient ID:    [id number]  Gender:        Female  YOB: 1960  Age:           61 Years 6 Months        History & Examination: The patient is a 61-year-old female with longstanding multiple sclerosis.  She has had pain in both arms.  We are asked to evaluate for a possible cervical radiculopathy.  The patient is referred by Dr. Barrera Shannon.      Techniques: Motor and sensory conduction studies were done with surface recording electrodes. EMG was done with a concentric needle electrode.        Results: The median antidromic sensory nerve action potentials are normal bilaterally.  The ulnar antidromic sensory nerve action potentials are normal bilaterally.  The left superficial radial sensory nerve action potential is normal.  Median and ulnar sensory distal latencies were compared using the palmar sensory technique.  There was no disproportionate slowing of the median distal latency on the right or left.  Median motor conduction studies are normal bilaterally.  Ulnar motor conduction studies revealed motor conduction slowing localized to the elbows bilaterally..  Median and ulnar distal motor conduction was compared by stimulating the median nerves and recording from the lumbricals and stimulating the ulnar nerves and recording from the 2nd palmar interossei at equal distances.  There was no disproportionate slowing of distal median motor conduction.  Needle exam of both arms revealed only long-duration motor unit potentials with reduced recruitment in left ulnar innervated muscles.  All other muscles were normal.      Interpretation: The EMG is abnormal.  There is EMG evidence for bilateral ulnar neuropathies at the elbow, worse on the left than the right.  There is no EMG evidence for a cervical radiculopathy on the right or left.        EMG Physician: Remigio Arteaga,  MD         Sensory NCS      Nerve / Sites Rec. Site Onset Peak Ref. NP Amp Ref. PP Amp Dist Isac Ref. Temp     ms ms ms  V  V  V cm m/s m/s  C   L MEDIAN - Dig II Anti      Wrist Dig II 2.45 3.33  35.6 10.0 63.2 14 57.2 48.0 30.2   R MEDIAN - Dig II Anti      Wrist Dig II 2.45 3.13  30.3 10.0 59.4 14 57.2 48.0 31.6   L ULNAR - Dig V Anti      Wrist Dig V 2.60 3.65  12.6 8.0 14.2 12.5 48.0 48.0 29.9   R ULNAR - Dig V Anti      Wrist Dig V 2.29 3.18  15.2 8.0 34.3 12.5 54.5 48.0 31.7   L RADIAL - Snuff      Forearm Snuff 2.03 2.60  57.6 15.0 66.4 12.5 61.5 48.0 30.2   L MEDIAN - Ulnar - Palmar      Median Wrist 1.56 2.03 2.40 84.9  112.6 8 51.2  30.2      Ulnar Wrist 1.67 2.29 2.40 26.8  16.1 8 48.0  30.3   R MEDIAN - Ulnar - Palmar      Median Wrist 1.67 2.19 2.40 54.7  73.2 8 48.0  31.7      Ulnar Wrist 1.56 2.03 2.40 13.1  13.2 8 51.2  31.7                         Motor NCS      Nerve / Sites Rec. Site Lat Ref. Amp Ref. Rel Amp Dist Isac Ref. Dur. Area Temp.     ms ms mV mV % cm m/s m/s ms %  C   L MEDIAN - APB      Wrist APB 3.44 4.40 10.2 5.0 100 8   8.28 100 30.8      Elbow APB 7.81  10.0  97.9 23 52.6 48.0 9.17 129 30.6   R MEDIAN - APB      Wrist APB 3.44 4.40 11.4 5.0 100 8   6.98 100 31.6      Elbow APB 7.29  10.7  94.1 22 57.1 48.0 7.60 95 31.7   L ULNAR - ADM      Wrist ADM 3.33 3.50 10.3 5.0 100 8   6.93 100 29.3      B.Elbow ADM 6.82  8.1  78.6 18 51.6 48.0 7.55 81.5 29.2      A.Elbow ADM 9.95  7.1  68.9 8 25.6 48.0 6.98 79.3 29.2   R ULNAR - ADM      Wrist ADM 2.76 3.50 11.4 5.0 100 8   6.61 100 31.7      B.Elbow ADM 6.35  8.3  72.8 19 52.9 48.0 7.03 93.1 31.6      A.Elbow ADM 8.75  7.0  60.8 8 33.4 48.0 7.19 77.1 31.6   L MEDIAN - II Lumb      Median II Lumb 3.75  0.7  100 10   7.24 100 33.5      Ulnar Palm Int 3.75  3.7  532 10   7.08 240 33.8   R MEDIAN - II Lumb      Median II Lumb 3.65  1.1  100 10   6.82 100 31.7      Ulnar Palm Int 3.23  4.7  432 10   6.25 281 31.7   L ULNAR - FDI      Wrist FDI  3.75  11.3  100    7.55 100 29.9      B.Elbow FDI 7.50  9.3  82.1 18 48.0  7.81 439 29.3      A.Elbow FDI 9.95  8.9  78.8 8 32.7  8.18 387 29.3   R ULNAR - FDI      Wrist FDI 3.70  16.4  100    5.26 100 31.7      B.Elbow FDI 7.19  16.5  101 20 57.3  5.47 101 31.7      A.Elbow FDI 9.43  15.2  92.8 8 35.7  5.94 92.3 31.7       EMG Summary Table     Spontaneous MUAP Recruitment    IA Fib/PSW Fasc H.F. Amp Dur. PPP Pattern   L. FIRST D INTEROSS N None None None 1+ 1+ N Normal   L. EXT INDICIS N None None None N N N Normal   L. FLEX CARPI ULN N None None None 1+ 1+ N Normal   L. PRON TERES N None None None N N N Normal   L. BICEPS N None None None N N N Normal   L. TRICEPS N None None None N N N Normal   R. FIRST D INTEROSS N None None None N N N Normal   R. BICEPS N None None None N N N Normal   R. TRICEPS N None None None N N N Normal

## 2021-09-05 ENCOUNTER — HEALTH MAINTENANCE LETTER (OUTPATIENT)
Age: 61
End: 2021-09-05

## 2021-10-15 DIAGNOSIS — G35 MULTIPLE SCLEROSIS (H): ICD-10-CM

## 2021-10-18 RX ORDER — OXYBUTYNIN CHLORIDE 15 MG/1
TABLET, EXTENDED RELEASE ORAL
Qty: 30 TABLET | Refills: 11 | Status: SHIPPED | OUTPATIENT
Start: 2021-10-18 | End: 2022-10-03

## 2021-10-18 NOTE — TELEPHONE ENCOUNTER
Received refill request for Oxybutynin from [facility]; Patient was last seen in June and has no follow up appointment in with Dr. Shannon. Refilled per MS refill protocol.    Jyoti Lowe RN

## 2022-02-22 ENCOUNTER — MEDICAL CORRESPONDENCE (OUTPATIENT)
Dept: NEUROLOGY | Facility: CLINIC | Age: 62
End: 2022-02-22

## 2022-02-22 ENCOUNTER — TELEPHONE (OUTPATIENT)
Dept: NEUROLOGY | Facility: CLINIC | Age: 62
End: 2022-02-22

## 2022-02-22 ENCOUNTER — OFFICE VISIT (OUTPATIENT)
Dept: NEUROLOGY | Facility: CLINIC | Age: 62
End: 2022-02-22
Attending: PSYCHIATRY & NEUROLOGY
Payer: COMMERCIAL

## 2022-02-22 VITALS — OXYGEN SATURATION: 97 % | DIASTOLIC BLOOD PRESSURE: 61 MMHG | HEART RATE: 67 BPM | SYSTOLIC BLOOD PRESSURE: 124 MMHG

## 2022-02-22 DIAGNOSIS — G35 MULTIPLE SCLEROSIS (H): Primary | ICD-10-CM

## 2022-02-22 DIAGNOSIS — G56.23 ULNAR NEUROPATHY OF BOTH UPPER EXTREMITIES: ICD-10-CM

## 2022-02-22 DIAGNOSIS — Z91.81 RISK FOR FALLS: ICD-10-CM

## 2022-02-22 PROCEDURE — G0463 HOSPITAL OUTPT CLINIC VISIT: HCPCS

## 2022-02-22 PROCEDURE — 99215 OFFICE O/P EST HI 40 MIN: CPT | Performed by: PSYCHIATRY & NEUROLOGY

## 2022-02-22 RX ORDER — AMANTADINE HYDROCHLORIDE 100 MG/1
100 CAPSULE, GELATIN COATED ORAL 2 TIMES DAILY
Qty: 60 CAPSULE | Refills: 11 | Status: SHIPPED | OUTPATIENT
Start: 2022-02-22 | End: 2023-08-29

## 2022-02-22 ASSESSMENT — PAIN SCALES - GENERAL: PAINLEVEL: NO PAIN (0)

## 2022-02-22 NOTE — PROGRESS NOTES
Service Date: 02/22/2022    REASON FOR VISIT:  Colletta Sorrell is a 62-year-old woman who I follow for primary progressive multiple sclerosis.  She returns for routine followup.  She was last seen in our clinic by Harmony Egan in 06/2021.    HISTORY OF PRESENT ILLNESS:  Colletta says she has been stable from an MS perspective.  Her main residual symptom is weak and clumsy legs, particularly the left leg.  She walks using a cane or a walker and arrived by wheelchair today.  Last summer, she had been complaining of pain in the neck and arms.  We obtained new MRIs of the brain and cervical spine.  The brain MRI showed a probable new lesion at the cervicomedullary junction on the right and C-spine MRI showed stable cord lesions, as well as multilevel cervical spondylosis, mainly at C5-C6, with severe left foraminal stenosis at that level that had progressed compared to 2017.  We had her undergo EMG and nerve conduction studies to see if cervical radiculopathy might be contributing.  This showed no evidence of that but did show bilateral ulnar neuropathy at the elbow.  She does not complain of any of those symptoms today.  She has a list of items she wants to discuss as usual.  She asked about Zeposia and we discussed that.  I explained why I would not recommend it compared to what she is on (as she has primary progressive MS and there is no evidence of Zeposia being effective at slowing that).  She asks about a patch called Lifewave X39 which she found on the Internet and is supposed to activate stem cells.  I told her I am not familiar with that, but I am skeptical of any such products that have not gone through FDA review.  She asked about getting approval for driving.  Right leg function is good enough and I really do not see any evidence that would be a problem.  We will prepare a letter for her stating such.  She asked if I would give her a COVID vaccine medical exemption and I told her I would not and explained  why, mainly that, in my opinion, the only legitimate medical exemption for that is a severe allergic reaction to prior vaccine such as anaphylaxis.  She asks about if anything can be done for her fatigue and we discussed that thoroughly and settled on a trial of amantadine.    PHYSICAL EXAMINATION:  Blood pressure 124/61, pulse 67, respiratory rate 14.  She is alert and oriented.  Affect is bright and language functions are normal.  Eye movements are full without diplopia or nystagmus.  Facial strength and sensation are normal.  There is no dysphonia or dysarthria.  Tone is somewhat increased in the left quadriceps.  Muscle power in the arms is normal, but hip flexion is weak bilaterally, worse on the left where it is less than antigravity.  Knee flexion is normal on the right but very weak on the left and there was 0/5 power of ankle dorsiflexion on the left today.  Finger tapping is mildly clumsy, toe tapping is clumsy on the right and impossible on the left.  Light touch was intact throughout.  Deep tendon reflexes were 3/4 at the biceps, 2/4 and symmetric at the knees.  She has a spastic, ataxic, left hemiparetic gait.  She actually came close to falling when taking a few steps with her cane when her cane slipped on a wet spot on the floor from her boots.  She was very unsteady with a single point cane and this appeared worse to me than in prior exams, but I have not personally examined her since late 2019.    IMPRESSION:  Primary progressive MS, subjectively stable but with some worsened gait and left leg weakness on exam today.  She attributes the changes today to the fact that she has not been doing her leg exercises.  That is certainly possible.  I spent 45 minutes on her care today including chart review, face-to-face and documentation time.  We discussed the various items she asked about as described above and discussed the amantadine dosing and potential side effects and the schedule for  ocrelizumab.    PLAN:    1.  Amantadine 100 mg twice a day.  2.  Next ocrelizumab infusion in May at [facility] and every 11 months moving forward.  3.  Follow up in 1 year.    Patient needs a custom-made AFO for the left side for long term use to help with decreasing knee hyperextension and accommodate contracture.    Barrera Shannon MD        D: 2022   T: 2022   MT: [email or web address]    Name:     SORRELL, COLLETTA D.  MRN:      [id number]        Account:      [id number]   :      1960           Service Date: 2022       Document: [id number]

## 2022-02-22 NOTE — LETTER
2/22/2022      RE: Colletta Dwen Sorrell  [address]  [place] [address]       Service Date: 02/22/2022    REASON FOR VISIT:  Colletta Sorrell is a 62-year-old woman who I follow for primary progressive multiple sclerosis.  She returns for routine followup.  She was last seen in our clinic by Harmony Egan in 06/2021.    HISTORY OF PRESENT ILLNESS:  Colletta says she has been stable from an MS perspective.  Her main residual symptom is weak and clumsy legs, particularly the left leg.  She walks using a cane or a walker and arrived by wheelchair today.  Last summer, she had been complaining of pain in the neck and arms.  We obtained new MRIs of the brain and cervical spine.  The brain MRI showed a probable new lesion at the cervicomedullary junction on the right and C-spine MRI showed stable cord lesions, as well as multilevel cervical spondylosis, mainly at C5-C6, with severe left foraminal stenosis at that level that had progressed compared to 2017.  We had her undergo EMG and nerve conduction studies to see if cervical radiculopathy might be contributing.  This showed no evidence of that but did show bilateral ulnar neuropathy at the elbow.  She does not complain of any of those symptoms today.  She has a list of items she wants to discuss as usual.  She asked about Zeposia and we discussed that.  I explained why I would not recommend it compared to what she is on (as she has primary progressive MS and there is no evidence of Zeposia being effective at slowing that).  She asks about a patch called Beibamboo X39 which she found on the Internet and is supposed to activate stem cells.  I told her I am not familiar with that, but I am skeptical of any such products that have not gone through FDA review.  She asked about getting approval for driving.  Right leg function is good enough and I really do not see any evidence that would be a problem.  We will prepare a letter for her stating such.  She asked if I  would give her a COVID vaccine medical exemption and I told her I would not and explained why, mainly that, in my opinion, the only legitimate medical exemption for that is a severe allergic reaction to prior vaccine such as anaphylaxis.  She asks about if anything can be done for her fatigue and we discussed that thoroughly and settled on a trial of amantadine.    PHYSICAL EXAMINATION:  Blood pressure 124/61, pulse 67, respiratory rate 14.  She is alert and oriented.  Affect is bright and language functions are normal.  Eye movements are full without diplopia or nystagmus.  Facial strength and sensation are normal.  There is no dysphonia or dysarthria.  Tone is somewhat increased in the left quadriceps.  Muscle power in the arms is normal, but hip flexion is weak bilaterally, worse on the left where it is less than antigravity.  Knee flexion is normal on the right but very weak on the left and there was 0/5 power of ankle dorsiflexion on the left today.  Finger tapping is mildly clumsy, toe tapping is clumsy on the right and impossible on the left.  Light touch was intact throughout.  Deep tendon reflexes were 3/4 at the biceps, 2/4 and symmetric at the knees.  She has a spastic, ataxic, left hemiparetic gait.  She actually came close to falling when taking a few steps with her cane when her cane slipped on a wet spot on the floor from her boots.  She was very unsteady with a single point cane and this appeared worse to me than in prior exams, but I have not personally examined her since late 2019.    IMPRESSION:  Primary progressive MS, subjectively stable but with some worsened gait and left leg weakness on exam today.  She attributes the changes today to the fact that she has not been doing her leg exercises.  That is certainly possible.  I spent 45 minutes on her care today including chart review, face-to-face and documentation time.  We discussed the various items she asked about as described above and  discussed the amantadine dosing and potential side effects and the schedule for ocrelizumab.    PLAN:    1.  Amantadine 100 mg twice a day.  2.  Next ocrelizumab infusion in May at [place] and every 11 months moving forward.  3.  Follow up in 1 year.      Barrera Shannon MD        D: 2022   T: 2022   MT: kaiser    Name:     SORRELL, COLLETTA D.  MRN:      [id number]        Account:      [id number]   :      1960           Service Date: 2022     Document: [id number]

## 2022-02-22 NOTE — TELEPHONE ENCOUNTER
Letter regarding no driving restrictions released in Epic and copy given to patient at her appointment with Dr Shannon on 2/22/2022.    Alma Cramer RN

## 2022-02-22 NOTE — LETTER
2022        Chelilissetbasilia Dicksonmary Ward CAMPA 1960        To Whom It May Concern:      Colletta Sorrell is a patient of mine who I see for multiple sclerosis at the [facility]. There are no restrictions related to Ms. Hopper's multiple sclerosis to safely and independently operate a motorized vehicle.     Please contact our office with questions.          Sincerely,    MD Alma Selby, MS RN Care Coordinator  [facility]  [address] [place] [id number]  Phone [phone]  Fax [phone]

## 2022-02-25 DIAGNOSIS — G35 MULTIPLE SCLEROSIS (H): ICD-10-CM

## 2022-03-01 NOTE — TELEPHONE ENCOUNTER
Received refill request for incontinence supply (Depend Fit Flex Women-M) from [facility]; Patient was last seen in February 2022 and has follow up appointment in February 2023 with Dr Shannon. Refilled per verbal order from Dr Shannon.    Alma Cramer RN

## 2022-03-21 ENCOUNTER — TELEPHONE (OUTPATIENT)
Dept: NEUROLOGY | Facility: CLINIC | Age: 62
End: 2022-03-21
Payer: COMMERCIAL

## 2022-03-21 RX ORDER — NALOXONE HYDROCHLORIDE 0.4 MG/ML
0.2 INJECTION, SOLUTION INTRAMUSCULAR; INTRAVENOUS; SUBCUTANEOUS
Status: CANCELLED | OUTPATIENT
Start: 2022-03-21

## 2022-03-21 RX ORDER — ACETAMINOPHEN 325 MG/1
650 TABLET ORAL ONCE
Status: CANCELLED | OUTPATIENT
Start: 2022-03-21

## 2022-03-21 RX ORDER — HEPARIN SODIUM,PORCINE 10 UNIT/ML
5 VIAL (ML) INTRAVENOUS
Status: CANCELLED | OUTPATIENT
Start: 2022-03-21

## 2022-03-21 RX ORDER — ALBUTEROL SULFATE 0.83 MG/ML
2.5 SOLUTION RESPIRATORY (INHALATION)
Status: CANCELLED | OUTPATIENT
Start: 2022-03-21

## 2022-03-21 RX ORDER — MEPERIDINE HYDROCHLORIDE 25 MG/ML
25 INJECTION INTRAMUSCULAR; INTRAVENOUS; SUBCUTANEOUS EVERY 30 MIN PRN
Status: CANCELLED | OUTPATIENT
Start: 2022-03-21

## 2022-03-21 RX ORDER — ALBUTEROL SULFATE 90 UG/1
1-2 AEROSOL, METERED RESPIRATORY (INHALATION)
Status: CANCELLED
Start: 2022-03-21

## 2022-03-21 RX ORDER — METHYLPREDNISOLONE SODIUM SUCCINATE 125 MG/2ML
125 INJECTION, POWDER, LYOPHILIZED, FOR SOLUTION INTRAMUSCULAR; INTRAVENOUS
Status: CANCELLED
Start: 2022-03-21

## 2022-03-21 RX ORDER — EPINEPHRINE 1 MG/ML
0.3 INJECTION, SOLUTION, CONCENTRATE INTRAVENOUS EVERY 5 MIN PRN
Status: CANCELLED | OUTPATIENT
Start: 2022-03-21

## 2022-03-21 RX ORDER — DIPHENHYDRAMINE HYDROCHLORIDE 50 MG/ML
50 INJECTION INTRAMUSCULAR; INTRAVENOUS
Status: CANCELLED
Start: 2022-03-21

## 2022-03-21 RX ORDER — DIPHENHYDRAMINE HCL 25 MG
50 CAPSULE ORAL ONCE
Status: CANCELLED | OUTPATIENT
Start: 2022-03-21

## 2022-03-21 RX ORDER — HEPARIN SODIUM (PORCINE) LOCK FLUSH IV SOLN 100 UNIT/ML 100 UNIT/ML
5 SOLUTION INTRAVENOUS
Status: CANCELLED | OUTPATIENT
Start: 2022-03-21

## 2022-03-21 RX ORDER — METHYLPREDNISOLONE SODIUM SUCCINATE 125 MG/2ML
125 INJECTION, POWDER, LYOPHILIZED, FOR SOLUTION INTRAMUSCULAR; INTRAVENOUS ONCE
Status: CANCELLED | OUTPATIENT
Start: 2022-03-21

## 2022-03-21 NOTE — TELEPHONE ENCOUNTER
Pt due for next Ocrevus infusion in May, and every 11 months going forward. Infuses at [facility]. Therapy plan expires 5/3. New therapy plan entered and routed to Dr Shannon for signature.    Alma Cramer RN

## 2022-03-22 NOTE — TELEPHONE ENCOUNTER
Called Colletta to let her know Dr Shannon has signed updated Ocrevus orders and that she can call to schedule her infusion for May.    Alma Cramer RN

## 2022-04-27 ENCOUNTER — NURSE TRIAGE (OUTPATIENT)
Dept: NURSING | Facility: CLINIC | Age: 62
End: 2022-04-27
Payer: COMMERCIAL

## 2022-04-27 NOTE — TELEPHONE ENCOUNTER
Patient calling , states she has diagnosed   With cervical stenosis, and treated with a medication.  She states it is not working, and is asking what her next step is, and what she can do for her stenosis.    She was advised to make an appointment to review her ongoing symptoms, and treatments.  Patient expressed understanding.    Judith Phillips RN   [facility] Nurse Advisor        Additional Information    Information only question and nurse able to answer    Protocols used: INFORMATION ONLY CALL - NO TRIAGE-A-OH

## 2022-05-06 ENCOUNTER — INFUSION THERAPY VISIT (OUTPATIENT)
Dept: INFUSION THERAPY | Facility: CLINIC | Age: 62
End: 2022-05-06
Payer: COMMERCIAL

## 2022-05-06 VITALS
OXYGEN SATURATION: 99 % | DIASTOLIC BLOOD PRESSURE: 82 MMHG | SYSTOLIC BLOOD PRESSURE: 126 MMHG | RESPIRATION RATE: 18 BRPM | BODY MASS INDEX: 24.3 KG/M2 | TEMPERATURE: 98 F | WEIGHT: 146 LBS | HEART RATE: 73 BPM

## 2022-05-06 DIAGNOSIS — G35 MULTIPLE SCLEROSIS (H): Primary | ICD-10-CM

## 2022-05-06 PROCEDURE — S0028 INJECTION, FAMOTIDINE, 20 MG: HCPCS | Performed by: NURSE PRACTITIONER

## 2022-05-06 PROCEDURE — 96413 CHEMO IV INFUSION 1 HR: CPT | Performed by: NURSE PRACTITIONER

## 2022-05-06 PROCEDURE — 96375 TX/PRO/DX INJ NEW DRUG ADDON: CPT | Performed by: NURSE PRACTITIONER

## 2022-05-06 PROCEDURE — 99207 PR NO CHARGE LOS: CPT

## 2022-05-06 PROCEDURE — 96415 CHEMO IV INFUSION ADDL HR: CPT | Performed by: NURSE PRACTITIONER

## 2022-05-06 RX ORDER — NALOXONE HYDROCHLORIDE 0.4 MG/ML
0.2 INJECTION, SOLUTION INTRAMUSCULAR; INTRAVENOUS; SUBCUTANEOUS
Status: CANCELLED | OUTPATIENT
Start: 2022-11-02

## 2022-05-06 RX ORDER — ACETAMINOPHEN 325 MG/1
650 TABLET ORAL ONCE
Status: CANCELLED | OUTPATIENT
Start: 2022-11-02

## 2022-05-06 RX ORDER — ACETAMINOPHEN 325 MG/1
650 TABLET ORAL ONCE
Status: COMPLETED | OUTPATIENT
Start: 2022-05-06 | End: 2022-05-06

## 2022-05-06 RX ORDER — EPINEPHRINE 1 MG/ML
0.3 INJECTION, SOLUTION INTRAMUSCULAR; SUBCUTANEOUS EVERY 5 MIN PRN
Status: CANCELLED | OUTPATIENT
Start: 2022-11-02

## 2022-05-06 RX ORDER — HEPARIN SODIUM,PORCINE 10 UNIT/ML
5 VIAL (ML) INTRAVENOUS
Status: CANCELLED | OUTPATIENT
Start: 2022-11-02

## 2022-05-06 RX ORDER — DIPHENHYDRAMINE HCL 25 MG
50 CAPSULE ORAL ONCE
Status: COMPLETED | OUTPATIENT
Start: 2022-05-06 | End: 2022-05-06

## 2022-05-06 RX ORDER — ALBUTEROL SULFATE 90 UG/1
1-2 AEROSOL, METERED RESPIRATORY (INHALATION)
Status: CANCELLED
Start: 2022-11-02

## 2022-05-06 RX ORDER — METHYLPREDNISOLONE SODIUM SUCCINATE 125 MG/2ML
125 INJECTION, POWDER, LYOPHILIZED, FOR SOLUTION INTRAMUSCULAR; INTRAVENOUS
Status: CANCELLED
Start: 2022-11-02

## 2022-05-06 RX ORDER — HEPARIN SODIUM (PORCINE) LOCK FLUSH IV SOLN 100 UNIT/ML 100 UNIT/ML
5 SOLUTION INTRAVENOUS
Status: CANCELLED | OUTPATIENT
Start: 2022-11-02

## 2022-05-06 RX ORDER — DIPHENHYDRAMINE HCL 25 MG
50 CAPSULE ORAL ONCE
Status: CANCELLED | OUTPATIENT
Start: 2022-11-02

## 2022-05-06 RX ORDER — DIPHENHYDRAMINE HYDROCHLORIDE 50 MG/ML
50 INJECTION INTRAMUSCULAR; INTRAVENOUS
Status: COMPLETED | OUTPATIENT
Start: 2022-05-06 | End: 2022-05-06

## 2022-05-06 RX ORDER — ALBUTEROL SULFATE 0.83 MG/ML
2.5 SOLUTION RESPIRATORY (INHALATION)
Status: CANCELLED | OUTPATIENT
Start: 2022-11-02

## 2022-05-06 RX ORDER — METHYLPREDNISOLONE SODIUM SUCCINATE 125 MG/2ML
125 INJECTION, POWDER, LYOPHILIZED, FOR SOLUTION INTRAMUSCULAR; INTRAVENOUS ONCE
Status: COMPLETED | OUTPATIENT
Start: 2022-05-06 | End: 2022-05-06

## 2022-05-06 RX ORDER — MEPERIDINE HYDROCHLORIDE 25 MG/ML
25 INJECTION INTRAMUSCULAR; INTRAVENOUS; SUBCUTANEOUS EVERY 30 MIN PRN
Status: CANCELLED | OUTPATIENT
Start: 2022-11-02

## 2022-05-06 RX ORDER — METHYLPREDNISOLONE SODIUM SUCCINATE 125 MG/2ML
125 INJECTION, POWDER, LYOPHILIZED, FOR SOLUTION INTRAMUSCULAR; INTRAVENOUS ONCE
Status: CANCELLED | OUTPATIENT
Start: 2022-11-02

## 2022-05-06 RX ORDER — DIPHENHYDRAMINE HYDROCHLORIDE 50 MG/ML
50 INJECTION INTRAMUSCULAR; INTRAVENOUS
Status: CANCELLED
Start: 2022-11-02

## 2022-05-06 RX ADMIN — Medication 250 ML: at 11:17

## 2022-05-06 RX ADMIN — ACETAMINOPHEN 650 MG: 325 TABLET ORAL at 10:57

## 2022-05-06 RX ADMIN — Medication 50 MG: at 10:57

## 2022-05-06 RX ADMIN — METHYLPREDNISOLONE SODIUM SUCCINATE 125 MG: 125 INJECTION INTRAMUSCULAR; INTRAVENOUS at 11:17

## 2022-05-06 RX ADMIN — DIPHENHYDRAMINE HYDROCHLORIDE 25 MG: 50 INJECTION INTRAMUSCULAR; INTRAVENOUS at 12:18

## 2022-05-06 NOTE — PROGRESS NOTES
Infusion Nursing Note:  Colletta Dwen Sorrell presents today for Ocrevus.    Patient seen by provider today: No   present during visit today: Not Applicable.    Note: Patient reports no new medical concerns today.    1216: writer entered bay to see how patient was feeling, she reported feeling itching on the roof of her mouth, head and throat. She also reported a weak cough. At this time the infusion was stopped.     1217: Vitals stable at this time-see flowsheets for details.    1218: Benadryl and Pepcid administered-see MAR for details.     1220: patient reports symptoms starting to resolve.     1250: patient reports symptoms resolved.     1300: Ocrevus restarted at 125 mL/hr for 30 minutes.     1330: Per charge RN, pt tolerated and rate was then increased to 250 mL/hr.    1454: Ocrevus infusion completed, patient reported feeling well overall after infusion was complete.     1556: 1 hour observation completed.     Intravenous Access:  Peripheral IV placed.    Treatment Conditions:  Biological Infusion Checklist:  ~~~ NOTE: If the patient answers yes to any of the questions below, hold the infusion and contact ordering provider or on-call provider.    1. Have you recently had an elevated temperature, fever, chills, productive cough, coughing for 3 weeks or longer or hemoptysis, abnormal vital signs, night sweats,  chest pain or have you noticed a decrease in your appetite, unexplained weight loss or fatigue? No  2. Do you have any open wounds or new incisions? No  3. Do you have any recent or upcoming hospitalizations, surgeries or dental procedures? No  4. Do you currently have or recently have had any signs of illness or infection or are you on any antibiotics? No  5. Have you had any new, sudden or worsening abdominal pain? No  6. Have you or anyone in your household received a live vaccination in the past 4 weeks? Please note:  No live vaccines while on biologic/chemotherapy until 6 months after the  last treatment.  Patient can receive the flu vaccine (shot only) and the pneumovax.  It is optimal for the patient to get these vaccines mid cycle, but they can be given at any time as long as it is not on the day of the infusion. No  7. Have you recently been diagnosed with any new nervous system diseases (ie. Multiple sclerosis, Guillain [place], seizures, neurological changes) or cancer diagnosis? No  8. Are you on any form of radiation or chemotherapy? No  9. Are you pregnant or breast feeding or do you have plans of pregnancy in the future? No  10. Have there been any other new onset medical symptoms? No        Post Infusion Assessment:  Patient tolerated infusion poorly due to : Hypersensitivity: Did patient have a hypersensitivity reaction? : Yes  Drug or Product name: Ocrevus  Were pre-meds administered?: Yes  What pre-meds were administered?: Acetaminophen (Tylenol);Diphenhydramine (Benadryl);Methylprednisolone  First or Subsequent treatment: Subsequent infusion  Rate of infusion when patient had hypersensitivity reaction: 250 (135 mls infused)  Time the hypersensitivity reaction was first recognized: 1216  Symptoms observed or reported (select all that apply): Itching;Other: (Comment) (cough)  Interventions/treatment following reaction: Infusion stopped;Hypersensitivity medications administered;Reduced rate of medication administration  What hypersensitivity medications were administered?: DiphendydrAMINE (benadryl);Famotidine(Pepcid);Methylprednisolone  Name of provider notified: Dr. Shannon  Time provider notified: 1610  Type of notification (select all that apply): Other: (Comment) (InBasket message)  Was the patient re-challenged today?: Yes - tolerated well  Patient observed for 1 hour observation post infusion.  Site patent and intact, free from redness, edema or discomfort.  No evidence of extravasations.  Access discontinued per protocol.  Biologic Infusion Post Education: Call the triage nurse at  your clinic or seek medical attention if you have chills and/or temperature greater than or equal to 100.5, uncontrolled nausea/vomiting, diarrhea, constipation, dizziness, shortness of breath, chest pain, heart palpitations, weakness or any other new or concerning symptoms, questions or concerns.  You cannot have any live virus vaccines prior to or during treatment or up to 6 months post infusion.  If you have an upcoming surgery, medical procedure or dental procedure during treatment, this should be discussed with your ordering physician and your surgeon/dentist.  If you are having any concerning symptom, if you are unsure if you should get your next infusion or wish to speak to a provider before your next infusion, please call your care coordinator or triage nurse at your clinic to notify them so we can adequately serve you.       Discharge Plan:   AVS to patient via [facility].  Patient will return in 11 months for next appointment.   Patient discharged in stable condition accompanied by: PCA.  Departure Mode: Wheelchair.      Maria L Gloria RN

## 2022-05-26 NOTE — LETTER
"1/9/2018      RE: Colletta Dwen Sorrell  [address] [address]       REASON FOR VISIT:  Colletta Sorrell is a 57-year-old woman who I have seen once previously in 07/2017 for primary progressive multiple sclerosis.  She returns for routine followup.      HISTORY OF PRESENT ILLNESS:  At our visit in July we opted to start ocrelizumab for primary progressive MS.  She tells me she only had a single infusion, rather than the 2 infusions 2 weeks apart.  Looking back, however, I see that she indeed had 2 infusions at [facility], 1  on 08/15 and the second on 08/29.  That would make her next infusion due in late February.  At her initial visit, she had been interested in participating in the clinical trial of ACTH, but as we were starting both dalfampridine and ocrelizumab she preferred to hold off until those were \"under control.\"  She took the Ampyra only briefly but stopped it because she felt \"wired\" and was sleeping poorly.  Her residual symptoms include leg weakness, worse on the left, fatigue, mild clumsiness and weakness of the left arm, neurogenic bladder and cognitive deficits.  She tells me that recently her right foot has been numb and tingly.  She had called with some unpleasant dysesthesias in the left neck, shoulder and arm.  She ultimately got some injections there and it is feeling better.      PHYSICAL EXAMINATION:   VITAL SIGNS:  Blood pressure 123/71.  Pulse 67.  Weight 147 pounds.     NEUROLOGIC:  She is alert and oriented.  Affect is bright, somewhat spacy.  Language functions are normal.  Cranial nerves are unremarkable.  Strength is normal in both arms but hip flexion is weak bilaterally at 4/5 on the right and 2/5 on the left.  Knee flexion strength is normal on the right but weak at 4/5 on the left.  Reflexes are asymmetric, brisker on the left.  She has a spastic, ataxic, left hemiparetic gait.      IMPRESSION:  Primary progressive MS, stable after initial doses of " ocrelizumab.  I spent 30 minutes with Colletta, greater than 50% of which was spent in counseling and coordination of care.  We discussed the Ampyra and her side effects.  I have had some patients have sleep disturbance on this and I suggested she try it again at 1 dose in the morning.  I reminded her that 2/3 of patients experience no benefit, but if she can tolerate it it would be good to see if it would improve her walking.  We also discussed ongoing use of the ocrelizumab and the potential for her still to continue with the ACTH study if she wants.  She would have to be stable on or off Ampyra for 3 months and enrollment is likely to close this calendar year, so that may be tricky.  She was a bit ambivalent today and we will see what happens with that.      PLAN:   1.  Continue ocrelizumab.   2.  Try Ampyra daily.   3.  Return to clinic in 6 months.         BARRERA HUIZAR MD             D: 01/10/2018 16:40   T: 01/10/2018 17:04   MT: nh      Name:     SORRELL, COLLETTA   MRN:      [id number]        Account:      [id number]   :      1960           Service Date: 2018      Document: [id number]        Barrera Huizar MD       Date Of Previous Biopsy (Optional): 04.15.2022

## 2022-05-31 ENCOUNTER — OFFICE VISIT (OUTPATIENT)
Dept: URGENT CARE | Facility: URGENT CARE | Age: 62
End: 2022-05-31
Payer: COMMERCIAL

## 2022-05-31 VITALS
HEIGHT: 65 IN | OXYGEN SATURATION: 99 % | DIASTOLIC BLOOD PRESSURE: 75 MMHG | HEART RATE: 69 BPM | WEIGHT: 140 LBS | BODY MASS INDEX: 23.32 KG/M2 | TEMPERATURE: 97.5 F | SYSTOLIC BLOOD PRESSURE: 118 MMHG

## 2022-05-31 DIAGNOSIS — J22 LOWER RESPIRATORY INFECTION: Primary | ICD-10-CM

## 2022-05-31 DIAGNOSIS — R05.9 COUGH: ICD-10-CM

## 2022-05-31 PROCEDURE — 99203 OFFICE O/P NEW LOW 30 MIN: CPT | Performed by: PHYSICIAN ASSISTANT

## 2022-05-31 RX ORDER — BENZONATATE 100 MG/1
CAPSULE ORAL
Qty: 30 CAPSULE | Refills: 0 | Status: SHIPPED | OUTPATIENT
Start: 2022-05-31

## 2022-05-31 RX ORDER — PREDNISONE 20 MG/1
20 TABLET ORAL DAILY
Qty: 5 TABLET | Refills: 0 | Status: SHIPPED | OUTPATIENT
Start: 2022-05-31 | End: 2022-06-05

## 2022-05-31 RX ORDER — AZITHROMYCIN 250 MG/1
TABLET, FILM COATED ORAL
Qty: 6 TABLET | Refills: 0 | Status: SHIPPED | OUTPATIENT
Start: 2022-05-31 | End: 2022-06-05

## 2022-05-31 NOTE — PROGRESS NOTES
Assessment & Plan     Lower respiratory infection  - azithromycin (ZITHROMAX) 250 MG tablet; Take 2 tablets (500 mg) by mouth daily for 1 day, THEN 1 tablet (250 mg) daily for 4 days.  - predniSONE (DELTASONE) 20 MG tablet; Take 1 tablet (20 mg) by mouth daily for 5 days  - benzonatate (TESSALON) 100 MG capsule; Take 1-2 capsules by mouth up to 3 times daily as needed for cough.    Cough  - azithromycin (ZITHROMAX) 250 MG tablet; Take 2 tablets (500 mg) by mouth daily for 1 day, THEN 1 tablet (250 mg) daily for 4 days.  - predniSONE (DELTASONE) 20 MG tablet; Take 1 tablet (20 mg) by mouth daily for 5 days  - benzonatate (TESSALON) 100 MG capsule; Take 1-2 capsules by mouth up to 3 times daily as needed for cough.    Because she has had symptoms for 2 weeks, will treat lower respiratory infection with azithromycin and prednisone.  Tessalon Perles as needed for coughing.  If symptoms worsen or do not improve, follow-up with PCP for further evaluation and possible chest x-ray.    20 minutes spent on the date of the encounter doing chart review, patient visit, and documentation     Return in about 1 week (around 6/7/2022) for visit with primary care provider if not improving.     Cintia Dillard PA-C  [facility]    Subjective   Colletta Blanca Hopper is a 62 year old who presents for the following health issues   HPI     URI Adult  Onset of symptoms was 2 week(s) ago. May 14  Course of illness is same.    Severity moderate  Current and Associated symptoms: cough - non-productive, wheezing, shortness of breath and fatigue    Colletta presents to clinic today for evaluation of a cough.  Symptoms first began on the 14th, 2 weeks ago.  Symptoms do not necessarily seem to be getting better or worse but are staying the same.  She has a dry nonproductive cough with wheezing shortness of breath and fatigue.  She states she just cannot seem to kick these symptoms just not getting better.  She has not  "had a fever.  She does have a history of multiple sclerosis.    Review of Systems   ROS negative except as stated above.      Objective    /75 (BP Location: Left arm, Patient Position: Sitting, Cuff Size: Adult Regular)   Pulse 69   Temp 97.5  F (36.4  C) (Tympanic)   Ht 1.651 m (5' 5\")   Wt 63.5 kg (140 lb)   SpO2 99%   BMI 23.30 kg/m    Physical Exam   GENERAL: healthy, alert and no distress  EYES: Eyes grossly normal to inspection, PERRL and conjunctivae and sclerae normal  HENT: ear canals and TM's normal, nose and mouth without ulcers or lesions  NECK: no adenopathy, no asymmetry, masses, or scars and thyroid normal to palpation  RESP: lungs clear to auscultation - no rales, rhonchi or wheezes.  Breathing is comfortable without use of accessory muscles  CV: regular rate and rhythm, normal S1 S2, no S3 or S4, no murmur, click or rub, no peripheral edema and peripheral pulses strong  SKIN: no suspicious lesions or rashes    No results found for any visits on 05/31/22.          "

## 2022-05-31 NOTE — PATIENT INSTRUCTIONS
No indication for antibiotics discussed.   Rest! Your body needs more rest to heal.  Drink plenty of fluids (warm fluids like tea or soup are soothing and reduce cough)  Sit in the bathroom with a hot shower running and breathe in the steam.  Honey may soothe your sore throat and help manage your cough- may take straight or in warm water with lemon juice.  Avoid smoke (cigarettes, bonfires, fireplace, wood burning stoves).  Take Tylenol or an NSAID such as ibuprofen or naproxen as needed for pain.  Delsym (dextromethorphan polistirex) is an over the counter cough medication that lasts 12 hours.   Mucinex or Robitussin (guiafenesin) thin mucus and may help it to loosen more quickly    Good handwashing is the best way to prevent spread of germs  Present to emergency room if you develop trouble breathing, swallowing or cough-up blood.  Follow up with your primary care provider if symptoms worsen or fail to improve as expected.

## 2022-08-03 DIAGNOSIS — G35 MULTIPLE SCLEROSIS (H): ICD-10-CM

## 2022-08-03 NOTE — TELEPHONE ENCOUNTER
Received refill request for incontinence supply (Depend Fit Flex Women M) from [facility]; Patient was last seen in February of this year and has follow up appointment in February of next year with Dr Shannon. Refilled per verbal order from Dr Shannon.    Alma Cramer RN

## 2022-08-07 ENCOUNTER — HEALTH MAINTENANCE LETTER (OUTPATIENT)
Age: 62
End: 2022-08-07

## 2022-09-30 DIAGNOSIS — G35 MULTIPLE SCLEROSIS (H): ICD-10-CM

## 2022-10-03 RX ORDER — OXYBUTYNIN CHLORIDE 15 MG/1
TABLET, EXTENDED RELEASE ORAL
Qty: 30 TABLET | Refills: 11 | Status: SHIPPED | OUTPATIENT
Start: 2022-10-03 | End: 2023-09-25

## 2022-10-03 NOTE — TELEPHONE ENCOUNTER
Received refill request for ditropan from [facility]; Patient was last seen in February and has follow up appointment in February with Dr Shannon. Refilled per [facility] refill protocol.    Alma Cramer RN

## 2022-10-04 DIAGNOSIS — G35 MULTIPLE SCLEROSIS (H): Primary | ICD-10-CM

## 2022-10-05 ENCOUNTER — THERAPY VISIT (OUTPATIENT)
Dept: PHYSICAL THERAPY | Facility: CLINIC | Age: 62
End: 2022-10-05
Payer: COMMERCIAL

## 2022-10-05 DIAGNOSIS — G35 MULTIPLE SCLEROSIS (H): ICD-10-CM

## 2022-10-05 DIAGNOSIS — M54.2 NECK PAIN: Primary | ICD-10-CM

## 2022-10-05 DIAGNOSIS — M54.9 BACK PAIN: ICD-10-CM

## 2022-10-05 PROCEDURE — 97112 NEUROMUSCULAR REEDUCATION: CPT | Mod: GP | Performed by: PHYSICAL THERAPIST

## 2022-10-05 PROCEDURE — 97161 PT EVAL LOW COMPLEX 20 MIN: CPT | Mod: GP | Performed by: PHYSICAL THERAPIST

## 2022-10-05 NOTE — PROGRESS NOTES
[facility]    OUTPATIENT Physical Therapy ORTHOPEDIC EVALUATION  PLAN OF TREATMENT FOR OUTPATIENT REHABILITATION  (COMPLETE FOR INITIAL CLAIMS ONLY)  Patient's Last Name, First Name, M.I.  YOB: 1960  Sorrell,Colletta Dwen    Provider s Name:  [facility]   Medical Record No.  [id number]   Start of Care Date:  10/05/22   Onset Date:   10/01/22   Type:     _X__PT   ___OT Medical Diagnosis:    Encounter Diagnoses   Name Primary?    Multiple sclerosis (H)     Back pain         Treatment Diagnosis:  Neck pain        Goals:     10/05/22 0500   Body Part   Goals listed below are for Neck   Goal #1   Goal #1 posture/body mechanics   Previous Functional Level Patient reports good posture and proper body mechanics   Current Functional Level Poor posture/body mechanics   STG Target Performance Patient able to demonstrate good posture and body mechanics without prompting;Improve patient awareness to maintain optimum posture the following percentage of time   Performance Level >75% of the time   Rationale to prevent neck/back pain and avoid injury when lifting/carrying and performing tasks requiring bending   Due Date 10/26/22   LTG Target Performance Improve patient awareness to maintain optimum posture the following percentage of time;Patient able to demonstrate good posture and body mechanics without prompting   Performance Level >85% of the time   Rationale to prevent neck/back pain and avoid injury when lifting/carrying and performing tasks requiring bending   Due Date 11/16/22       Therapy Frequency:  1x/week  Predicted Duration of Therapy Intervention:  6 weeks    Zuhair Cat, PT                 I CERTIFY THE NEED FOR THESE SERVICES FURNISHED UNDER        THIS PLAN OF TREATMENT AND WHILE UNDER MY CARE     (Physician co-signature of this document indicates review and  certification of the therapy plan).                     Certification Date From:  10/05/22   Certification Date To:  11/16/22    Referring Provider:  Barrera Shannon    Initial Assessment        See Epic Evaluation SOC Date: 10/05/22

## 2022-10-05 NOTE — PROGRESS NOTES
Physical Therapy Initial Evaluation  Subjective:  The history is provided by the patient. No  was used.   Patient Health History  Colletta Dwen Sorrell being seen for Neck pain that has since resolved from 10/1 but patient is concerned about stenosis worsening. Also reports low back pain with abdominal pain that has since resolved as well.     Problem began: 10/1/2022 (5+ years ago initially; acute on chronic).   Problem occurred: Unsure   Pain is reported as 2/10 on pain scale.  General health as reported by patient is good.  Pertinent medical history includes: multiple sclerosis, numbness/tingling and sleep disorder/apnea.   Red flags:  None as reported by patient.     Surgeries include:  Other.    Current medications:  Other. Other medications details: antibiotics for UTI currently.    Current occupation is None.                     Therapist Generated HPI Evaluation         Type of problem:  Cervical spine.    This is a chronic condition.  Condition occurred with:  Insidious onset.  Where condition occurred: for unknown reasons.  Patient reports pain:  Cervical left side.  Pain quality: unable to describe. and is constant.  Pain radiates to:  Upper arm left. Pain is the same all the time.  Since onset symptoms are rapidly improving (no pain currently).  Associated with: unable to describe. Symptoms are exacerbated by rotating head (side bending head -- patient unsure overall what triggers the pain)  and relieved by nothing.      Barriers include:  None as reported by patient.                        Objective:  Standing Alignment:    Cervical/Thoracic:  Forward head  Shoulder/UE:  Rounded shoulders                                  Cervical/Thoracic Evaluation  Arom wnl cervical: no pain with AROM.     AROM:  AROM Cervical:    Flexion:            Normal  Extension:       Normal  Rotation:         Left: min loss     Right: normal  Side Bend:      Left: mod loss     Right:  Min  loss      Headaches cervical eval: come and go sharp and shooting headaches.  Cervical Myotomes:  normal (L hand and arm overall weaker due to MS - no acute changes in strength per patient)      C4 (shrug):  Left: 5    Right: 5  C5 (Deltoid):  Left: 5    Right: 5  C6 (Biceps):  Left: 5    Right: 5  C7 (Triceps):  Left: 5    Right: 5  C8 (Thumb Ext): Left: 4    Right: 5  T1 (Intrinsics): Left: 4    Right: 5  DTR's:  normal          Cervical Dermatomes:  normal (No reproduction of symptoms into L arm.)                    Cervical Palpation:    Tenderness present at Left:    Upper Trap    Functional Tests:  Core strength and proprioception spine wnl: - Spurling's bilaterally.    Cervical Stability/Joint Clearing:  normal      Spinal Segmental Conclusions:  Not assessed          Cord Sign:  not assessed                                            General     ROS    Assessment/Plan:    Patient is a 62 year old female with cervical complaints.    Patient has the following significant findings with corresponding treatment plan.                Diagnosis 1:  Cervical pain with L arm pain  Pain -  hot/cold therapy, manual therapy, self management, education, directional preference exercise and home program  Decreased ROM/flexibility - manual therapy, therapeutic exercise and home program  Decreased joint mobility - manual therapy, therapeutic exercise and home program  Decreased strength - therapeutic exercise, therapeutic activities and home program  Decreased function - therapeutic activities and home program  Impaired posture - neuro re-education and home program    Therapy Evaluation Codes:   Cumulative Therapy Evaluation is: Low complexity.    Previous and current functional limitations:  (See Goal Flow Sheet for this information)    Short term and Long term goals: (See Goal Flow Sheet for this information)     Communication ability:  Patient appears to be able to clearly communicate and understand verbal and written  communication and follow directions correctly.  Treatment Explanation - The following has been discussed with the patient:   RX ordered/plan of care  Anticipated outcomes  Possible risks and side effects  This patient would benefit from PT intervention to resume normal activities.   Rehab potential is good.    Frequency:  1 X week, once daily  Duration:  for 6 weeks  Discharge Plan:  Achieve all LTG.  Independent in home treatment program.  Reach maximal therapeutic benefit.    Please refer to the daily flowsheet for treatment today, total treatment time and time spent performing 1:1 timed codes.

## 2022-10-12 ENCOUNTER — THERAPY VISIT (OUTPATIENT)
Dept: PHYSICAL THERAPY | Facility: CLINIC | Age: 62
End: 2022-10-12
Payer: COMMERCIAL

## 2022-10-12 DIAGNOSIS — M54.2 NECK PAIN: Primary | ICD-10-CM

## 2022-10-12 PROCEDURE — 97140 MANUAL THERAPY 1/> REGIONS: CPT | Mod: GP | Performed by: PHYSICAL THERAPIST

## 2022-10-12 PROCEDURE — 97112 NEUROMUSCULAR REEDUCATION: CPT | Mod: GP | Performed by: PHYSICAL THERAPIST

## 2022-10-12 PROCEDURE — 97110 THERAPEUTIC EXERCISES: CPT | Mod: GP | Performed by: PHYSICAL THERAPIST

## 2022-10-26 ENCOUNTER — THERAPY VISIT (OUTPATIENT)
Dept: PHYSICAL THERAPY | Facility: CLINIC | Age: 62
End: 2022-10-26
Payer: COMMERCIAL

## 2022-10-26 DIAGNOSIS — M54.2 NECK PAIN: Primary | ICD-10-CM

## 2022-10-26 PROCEDURE — 97140 MANUAL THERAPY 1/> REGIONS: CPT | Mod: GP

## 2022-10-26 PROCEDURE — 97110 THERAPEUTIC EXERCISES: CPT | Mod: GP

## 2022-10-29 ENCOUNTER — HEALTH MAINTENANCE LETTER (OUTPATIENT)
Age: 62
End: 2022-10-29

## 2022-11-09 ENCOUNTER — THERAPY VISIT (OUTPATIENT)
Dept: PHYSICAL THERAPY | Facility: CLINIC | Age: 62
End: 2022-11-09
Payer: COMMERCIAL

## 2022-11-09 DIAGNOSIS — M54.2 NECK PAIN: Primary | ICD-10-CM

## 2022-11-09 PROCEDURE — 97110 THERAPEUTIC EXERCISES: CPT | Mod: GP

## 2022-11-09 PROCEDURE — 97140 MANUAL THERAPY 1/> REGIONS: CPT | Mod: GP

## 2022-11-28 ENCOUNTER — TELEPHONE (OUTPATIENT)
Dept: NEUROLOGY | Facility: CLINIC | Age: 62
End: 2022-11-28

## 2022-11-28 DIAGNOSIS — G35 MULTIPLE SCLEROSIS (H): Primary | ICD-10-CM

## 2022-11-28 NOTE — TELEPHONE ENCOUNTER
Spoke with Colletta who confirmed she found a massage therapist who would submit to insurance. Massage therapy referral faxed to [facility] (phone [phone], fax [phone]).    Jyoti Lowe RN

## 2022-11-28 NOTE — TELEPHONE ENCOUNTER
[facility] Call Center    Phone Message    May a detailed message be left on voicemail: yes     Reason for Call: Other:  Patient is calling to follow up with authorization for massage therapy referral. Patient states this needs authorization and medical records  for her insurance to approve this service. Please contact patient at [phone]      Action Taken: Message routed to:  Other: [place]    Travel Screening: Not Applicable

## 2022-11-30 ENCOUNTER — THERAPY VISIT (OUTPATIENT)
Dept: PHYSICAL THERAPY | Facility: CLINIC | Age: 62
End: 2022-11-30
Payer: COMMERCIAL

## 2022-11-30 DIAGNOSIS — M54.2 NECK PAIN: Primary | ICD-10-CM

## 2022-11-30 PROCEDURE — 97110 THERAPEUTIC EXERCISES: CPT | Mod: GP | Performed by: PHYSICAL THERAPIST

## 2022-11-30 PROCEDURE — 97140 MANUAL THERAPY 1/> REGIONS: CPT | Mod: GP | Performed by: PHYSICAL THERAPIST

## 2022-11-30 NOTE — PROGRESS NOTES
Subjective:  HPI  Physical Exam                    Objective:  System    Physical Exam    General     ROS    Assessment/Plan:    PROGRESS  REPORT    Progress reporting period is from 10/5/2022 to 11/30/2022.       SUBJECTIVE  Subjective changes noted by patient:   Patient stated overall the neck and arm are better;  she is doing her HEP daily and feels they are helpful.  Patient stated she really likes the massage and did get an order from her MD and is talking wtih her insurance company about authorization and where she can go  Current pain level is : 3/10.     Previous pain level was  NA  .   Changes in function:  Yes (See Goal flowsheet attached for changes in current functional level)  Adverse reaction to treatment or activity: None    OBJECTIVE  Changes noted in objective findings:    Posture: fair; noted forward head and rounded shoulders  R shoulder AROM: WNL  CROM: WNL, some tightness and pulling reported    ASSESSMENT/PLAN  Updated problem list and treatment plan: Diagnosis 1:  Neck pain  Impaired muscle performance - home program  Decreased function - home program  STG/LTGs have been met or progress has been made towards goals:  Yes , some progression noted but goals not met  Assessment of Progress: The patient's condition has potential to improve.  Self Management Plans:  Patient is independent in a home treatment program.    Colletta continues to require the following intervention to meet STG and LTG's:  PT feels seh can be done with PT    Recommendations:  Patient would like to trial on her own at this time; Will continue with HEP and follow up with massage therapist when she receives approval via insurance.    Please refer to the daily flowsheet for treatment today, total treatment time and time spent performing 1:1 timed codes.

## 2022-12-01 NOTE — PROGRESS NOTES
[facility]    OUTPATIENT Physical Therapy ORTHOPEDIC EVALUATION  PLAN OF TREATMENT FOR OUTPATIENT REHABILITATION  (COMPLETE FOR INITIAL CLAIMS ONLY)  Patient's Last Name, First Name, M.I.  YOB: 1960  Sorrell,Colletta Dwen    Provider s Name:  [facility]   Medical Record No.  [id number]   Start of Care Date:  10/05/22   Onset Date:   10/01/22   Treatment Diagnosis:  Neck pain with R arm sx's Medical Diagnosis:  Neck pain       Goals:     11/30/22 0500   Body Part   Goals listed below are for Neck   Goal #1   Goal #1 posture/body mechanics   Previous Functional Level Patient reports good posture and proper body mechanics   Current Functional Level Fair posture/body mechanics   Performance level pt is able to correct is aware when she is sitting poorly   STG Target Performance Patient able to demonstrate good posture and body mechanics without prompting;Improve patient awareness to maintain optimum posture the following percentage of time   Performance Level >75% of the time   Rationale to prevent neck/back pain and avoid injury when lifting/carrying and performing tasks requiring bending   Due Date 11/30/22   If goal not met, Why? progressing but no met consistently   LTG Target Performance Improve patient awareness to maintain optimum posture the following percentage of time;Patient able to demonstrate good posture and body mechanics without prompting   Performance Level >85% of the time   Rationale to prevent neck/back pain and avoid injury when lifting/carrying and performing tasks requiring bending   Due Date 12/17/22       Therapy Frequency:  1x/week  Predicted Duration of Therapy Intervention:  1 visit    Tata Inman, PT                 I CERTIFY THE NEED FOR THESE SERVICES FURNISHED UNDER        THIS PLAN OF TREATMENT AND WHILE UNDER MY CARE     (Physician  attestation of this document indicates review and certification of the therapy plan).                     Certification Date From:  11/17/22   Certification Date To:  12/17/22    Referring Provider:  Barrera Shannon MD    Initial Assessment        See Epic Evaluation SOC Date: 10/05/22

## 2022-12-02 NOTE — TELEPHONE ENCOUNTER
Called [facility] (phone [phone]) who confirmed that have received Colletta's massage referral and will be contacting her insurance to determine coverage. If Amanda calls back the neurology clinic regarding this request, she should be directed to [facility] (they have already been in contact with her).    Jyoti Lowe RN

## 2022-12-02 NOTE — TELEPHONE ENCOUNTER
[facility] Call Center    Phone Message    May a detailed message be left on voicemail: yes     Reason for Call: Order(s): Other:   Reason for requested: Massage Therapy  Date needed: ASAP  Provider name: Dr. Shannon    Pt called back again stating that her insurance carrier [facility] still needs the referral faxed for her Massage Therapy.    Pt did not have the fax number to wheere it is to be sent.      Action Taken: Message routed to:  [facility] Neurology    Travel Screening: Not Applicable

## 2023-01-05 NOTE — TELEPHONE ENCOUNTER
[facility] Call Center    Phone Message    May a detailed message be left on voicemail: yes     Reason for Call: Other: Pt is calling because Dr. Shannon referred her to [facility] for massage therapy. Pt says [facility] has a requirement. The referral needs to say PT/OT inclusive with massage therapy. So a new referral will need to be sent to [facility]. Also pt has new insurance. The old referral was sent to  but pt now has [employer]. Please fax new referral to Fax: [phone]    Action Taken: Message routed to:  [facility]  MS    Travel Screening: Not Applicable

## 2023-01-05 NOTE — TELEPHONE ENCOUNTER
Colletta still attempting to get massage therapy.  Requesting a PT order be sent to [facility] that specifies massage therapy.  PT ordered and will be faxed accordingly.    Jyoti Lowe RN

## 2023-01-10 NOTE — TELEPHONE ENCOUNTER
Referral for physical therapy orders have been faxed over to [facility] at [phone]  Celestine Valencia EMT 01/10/2023 3:29 PM

## 2023-01-26 ENCOUNTER — DOCUMENTATION ONLY (OUTPATIENT)
Dept: NEUROLOGY | Facility: CLINIC | Age: 63
End: 2023-01-26

## 2023-01-26 NOTE — PROGRESS NOTES
Rehabilitation forms received and placed on Dr. Shannon's folder for signature.  Celestine KERR 01/26/2023 8:19 AM

## 2023-01-31 NOTE — PROGRESS NOTES
[facility] forms signed and faxed back to [facility]  Celestine Valencia EMT 01/31/2023 1:58PM

## 2023-02-22 ENCOUNTER — DOCUMENTATION ONLY (OUTPATIENT)
Dept: NEUROLOGY | Facility: CLINIC | Age: 63
End: 2023-02-22

## 2023-02-22 NOTE — PROGRESS NOTES
[facility] from received and placed in Dr. Shannon's folder for review and signature.   Celestine Valencia EMT 02/22/2023 1:53PM

## 2023-02-27 ENCOUNTER — TELEPHONE (OUTPATIENT)
Dept: NEUROLOGY | Facility: CLINIC | Age: 63
End: 2023-02-27

## 2023-02-27 DIAGNOSIS — G35 MULTIPLE SCLEROSIS (H): Primary | ICD-10-CM

## 2023-02-27 RX ORDER — HEPARIN SODIUM,PORCINE 10 UNIT/ML
5 VIAL (ML) INTRAVENOUS
Status: CANCELLED | OUTPATIENT
Start: 2023-04-06

## 2023-02-27 RX ORDER — HEPARIN SODIUM (PORCINE) LOCK FLUSH IV SOLN 100 UNIT/ML 100 UNIT/ML
5 SOLUTION INTRAVENOUS
Status: CANCELLED | OUTPATIENT
Start: 2023-04-06

## 2023-02-27 RX ORDER — ALBUTEROL SULFATE 90 UG/1
1-2 AEROSOL, METERED RESPIRATORY (INHALATION)
Status: CANCELLED
Start: 2023-04-06

## 2023-02-27 RX ORDER — METHYLPREDNISOLONE SODIUM SUCCINATE 125 MG/2ML
125 INJECTION, POWDER, LYOPHILIZED, FOR SOLUTION INTRAMUSCULAR; INTRAVENOUS ONCE
Status: CANCELLED | OUTPATIENT
Start: 2023-04-06

## 2023-02-27 RX ORDER — MEPERIDINE HYDROCHLORIDE 25 MG/ML
25 INJECTION INTRAMUSCULAR; INTRAVENOUS; SUBCUTANEOUS EVERY 30 MIN PRN
Status: CANCELLED | OUTPATIENT
Start: 2023-04-06

## 2023-02-27 RX ORDER — EPINEPHRINE 1 MG/ML
0.3 INJECTION, SOLUTION, CONCENTRATE INTRAVENOUS EVERY 5 MIN PRN
Status: CANCELLED | OUTPATIENT
Start: 2023-04-06

## 2023-02-27 RX ORDER — ACETAMINOPHEN 325 MG/1
650 TABLET ORAL ONCE
Status: CANCELLED | OUTPATIENT
Start: 2023-04-06

## 2023-02-27 RX ORDER — ALBUTEROL SULFATE 0.83 MG/ML
2.5 SOLUTION RESPIRATORY (INHALATION)
Status: CANCELLED | OUTPATIENT
Start: 2023-04-06

## 2023-02-27 RX ORDER — DIPHENHYDRAMINE HYDROCHLORIDE 50 MG/ML
50 INJECTION INTRAMUSCULAR; INTRAVENOUS
Status: CANCELLED
Start: 2023-04-06

## 2023-02-27 RX ORDER — METHYLPREDNISOLONE SODIUM SUCCINATE 125 MG/2ML
125 INJECTION, POWDER, LYOPHILIZED, FOR SOLUTION INTRAMUSCULAR; INTRAVENOUS
Status: CANCELLED
Start: 2023-04-06

## 2023-02-27 RX ORDER — DIPHENHYDRAMINE HCL 25 MG
50 CAPSULE ORAL ONCE
Status: CANCELLED | OUTPATIENT
Start: 2023-04-06

## 2023-02-27 NOTE — LETTER
2023        RE: Colletta D. Sorrell   : 1960        To Whom It May Concern:      I write to appeal the denial of coverage for Ocrevus (ocrelizumab) as a disease modifying therapy for multiple sclerosis for my patient Colletta Sorrell.  I write as both Ms. Hopper's neurologist and as a specialist in the management of multiple sclerosis.  Ms. Hopper has been under my care in the multiple  sclerosis clinic at the [facility] since .      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  Ocrevus is the only FDA approved treatment for her condition.  Ms. Hopper has been treated with Ocrevus since 2017 (first dose 8/15/2017) and has tolerated the medication well.  It is absolutely medically necessary for Ms. Hopper to continue on this medication to maintain her current level of function.  The denial letter requests baseline testing results.  A 25-foot timed walk was performed in 25 seconds on 2017.  In order to continue to provide safe and effective care for Ms. Hopper, and to provide her with the best chance of maintaining her functional status, I urge you to reconsider your denial of Ocrevus.     Please contact my office with any questions.          Sincerely,    Barrera Shannon MD  [facility]  [address] [place] [id number]  Phone [phone]  Fax [phone]

## 2023-02-27 NOTE — TELEPHONE ENCOUNTER
Pt infuses Ocrevus every 11 months at the [facility]. Last infused 5/6/22, so is due for next infusion with target date 4/6/23. Ocrevus therapy plan routed to Dr Shannon for signature. Pt has follow-up appointment with Dr Shannon scheduled for tomorrow.    Alma Cramer RN

## 2023-03-01 NOTE — PROGRESS NOTES
[facility] forms signed and faxed back at [phone]   Celestine KERR 03/01/2023 4:13PM

## 2023-03-06 NOTE — TELEPHONE ENCOUNTER
Scheduled for infusion at [facility] on 4/6. Pt is also rescheduled for clinic visit with Dr Shannon on 5/30, as pt missed her clinic visit scheduled on 2/28.     Staff message sent to infusion finance, as pt appears to have new insurance since last infusion.    Alma Cramer RN

## 2023-03-06 NOTE — TELEPHONE ENCOUNTER
Staff message sent to [facility] infusion scheduling requesting assistance with scheduling next infusion, due 4/6.     Alma Cramer RN

## 2023-03-15 ENCOUNTER — TRANSFERRED RECORDS (OUTPATIENT)
Dept: HEALTH INFORMATION MANAGEMENT | Facility: CLINIC | Age: 63
End: 2023-03-15
Payer: COMMERCIAL

## 2023-03-15 NOTE — TELEPHONE ENCOUNTER
Spoke with Colletta to let her know that we have appealed the denial of Ocrevus. Informed Colletta that there is a possibility that her infusion on 4/6 will need to be postponed, but ok to keep infusion as scheduled for now.    Alma Cramer RN

## 2023-03-17 NOTE — TELEPHONE ENCOUNTER
Received information from infusion finance that Ocrevus was approved after appeal. Will need new PA prior to next infusion in 11 months.    Alma Cramer RN

## 2023-03-20 ENCOUNTER — DOCUMENTATION ONLY (OUTPATIENT)
Dept: NEUROLOGY | Facility: CLINIC | Age: 63
End: 2023-03-20
Payer: COMMERCIAL

## 2023-03-20 NOTE — PROGRESS NOTES
Therapy plan of care has been received and placed in Dr. Shannon's folder for review and signature.   Celestine Valencia EMT 03/20/2023 9:11AM

## 2023-03-21 NOTE — PROGRESS NOTES
Pro therapy forsm have been signed and faxed back at [phone]  Celestine Valencia EMT 03/21/2023 9:47AM

## 2023-03-22 ENCOUNTER — DOCUMENTATION ONLY (OUTPATIENT)
Dept: NEUROLOGY | Facility: CLINIC | Age: 63
End: 2023-03-22
Payer: COMMERCIAL

## 2023-03-22 NOTE — PROGRESS NOTES
Denial of service for Ocrevus 300mg received and faxed over to Shantel Sampson at [phone]  Celestine Valencia EMT 03/22/2023 12:18PM

## 2023-04-04 ENCOUNTER — TELEPHONE (OUTPATIENT)
Dept: NEUROLOGY | Facility: CLINIC | Age: 63
End: 2023-04-04
Payer: COMMERCIAL

## 2023-04-04 NOTE — TELEPHONE ENCOUNTER
[facility] Call Center    Phone Message    May a detailed message be left on voicemail: yes     Reason for Call: Other: Pt is calling back in regards to this message. Pt states the DMV advised her that they cannot accept a copied signature. They need an updated/new signature from Dr. Shannon. Pt stated she would like this emailed/mailed out to her. Please contact back with any additional questions.     Action Taken: Message routed to:  [facility] Neurology    Travel Screening: Not Applicable

## 2023-04-04 NOTE — TELEPHONE ENCOUNTER
[facility] Call Center    Phone Message    May a detailed message be left on voicemail: yes     Reason for Call: Other: Pt is calling in regards to a form/letter Dr. Shannon completed for her a couple of years back for driving. Pt states she would like this mailed to her home address on file. Pt would like a call back to discuss.      Action Taken: Message routed to:  [facility] Neurology    Travel Screening: Not Applicable

## 2023-04-04 NOTE — TELEPHONE ENCOUNTER
Spoke with Colletta. She requested that we email and mail her the letter regarding driving that Dr Shannon wrote in Feb 2022. Letter placed in outgoing mail, and emailed to [email or web address].    Alma Cramer RN

## 2023-04-04 NOTE — LETTER
2023        Colletta Dicksonmary Ward [place] 1960        To Whom It May Concern:      Colletta Sorrell is a patient of mine who I see for multiple sclerosis at the [facility]. There are no restrictions related to Ms. Hopper's multiple sclerosis to safely and independently operate a motorized vehicle.     Please contact our office with questions.          Sincerely,    MD Alma Selby, MS RN Care Coordinator  [facility]  [address] [place] [id number]  Phone [phone]  Fax [phone]

## 2023-04-07 NOTE — TELEPHONE ENCOUNTER
Per Dr Shannon, ok to update driving letter. Spoke with Colletta. She reports that the DMV needs a physical signature, and not a copy. Letter updated and placed in Dr Shannon's folder for signature. Will mail to Colletta's home address once signed.     Alma Cramer RN

## 2023-04-19 NOTE — TELEPHONE ENCOUNTER
[facility] Call Center    Phone Message    May a detailed message be left on voicemail: no     Reason for Call: Other: Pt states she hasnt yet received the letter for driving. Please re mail the letter with physical the signature of Dr. Shannon      Action Taken: Message routed to:  [facility] neurology     Travel Screening: Not Applicable

## 2023-04-25 NOTE — TELEPHONE ENCOUNTER
Letter has been signed and mailed to patients home address.   Celestine Valencia EMT 04/25/2023 2:34PM

## 2023-04-28 ENCOUNTER — TRANSFERRED RECORDS (OUTPATIENT)
Dept: HEALTH INFORMATION MANAGEMENT | Facility: CLINIC | Age: 63
End: 2023-04-28
Payer: COMMERCIAL

## 2023-05-04 NOTE — TELEPHONE ENCOUNTER
Noted that Colletta did not show up for her infusion on 4/6. Called Colletta - she thought the appointment was in May. Staff message sent to [facility] infusion scheduling requesting that they contact pt to schedule.    Alma Cramer RN

## 2023-05-10 DIAGNOSIS — G35 MULTIPLE SCLEROSIS (H): ICD-10-CM

## 2023-05-10 NOTE — TELEPHONE ENCOUNTER
Received confirmation from infusion finance that PA will cover the new infusion date. PA is site specific and pt needs to infuse at [facility] (where she is scheduled).    Alma Cramer RN

## 2023-05-10 NOTE — TELEPHONE ENCOUNTER
Infusion rescheduled for 6/9. This is 2 months late. Will inform Dr Shannon of the delay. Staff message also sent to infusion finance to confirm insurance approval will cover this rescheduled date.     Alma Cramer RN

## 2023-05-10 NOTE — TELEPHONE ENCOUNTER
Received refill request for incontinence supply from [facility]; Patient was last seen in Feb 2022 and has follow up appointment in May 2023 with Dr Shannon. Pended to Dr Shannon for signature.    Alma Cramer RN

## 2023-05-12 ENCOUNTER — DOCUMENTATION ONLY (OUTPATIENT)
Dept: NEUROLOGY | Facility: CLINIC | Age: 63
End: 2023-05-12
Payer: COMMERCIAL

## 2023-05-12 NOTE — PROGRESS NOTES
Physical therapy forms received from [facility], placed in Dr. Shannon's folder for review and signature.  Celestine Valencia EMT 05/12/2023 11:08AM

## 2023-06-06 ENCOUNTER — DOCUMENTATION ONLY (OUTPATIENT)
Dept: NEUROLOGY | Facility: CLINIC | Age: 63
End: 2023-06-06
Payer: COMMERCIAL

## 2023-06-06 NOTE — PROGRESS NOTES
[employer] Level of Need form has been completed and faxed to [phone].    Alma Cramer RN

## 2023-06-23 ENCOUNTER — DOCUMENTATION ONLY (OUTPATIENT)
Dept: NEUROLOGY | Facility: CLINIC | Age: 63
End: 2023-06-23

## 2023-06-23 NOTE — PROGRESS NOTES
Spoke with patient to reschedule over due follow up with Dr. Shannon, patient is scheduled on August 22nd 8:30AM, also remined patient of her infusion on July 24th.  Celestine Valencia EMT 06/23/2023 12:35PM

## 2023-06-23 NOTE — TELEPHONE ENCOUNTER
Pt canceled the 6/9 infusion and is rescheduled for 7/24. Pt also did not show up for clinic visit with Dr Shannon on 5/30. Will ask MA to contact pt to reschedule follow-up.    Alma Cramer RN

## 2023-07-24 ENCOUNTER — INFUSION THERAPY VISIT (OUTPATIENT)
Dept: INFUSION THERAPY | Facility: CLINIC | Age: 63
End: 2023-07-24
Payer: COMMERCIAL

## 2023-07-24 VITALS
BODY MASS INDEX: 26.61 KG/M2 | DIASTOLIC BLOOD PRESSURE: 73 MMHG | HEART RATE: 58 BPM | WEIGHT: 159.9 LBS | TEMPERATURE: 99 F | OXYGEN SATURATION: 98 % | RESPIRATION RATE: 16 BRPM | SYSTOLIC BLOOD PRESSURE: 122 MMHG

## 2023-07-24 DIAGNOSIS — G35 MULTIPLE SCLEROSIS (H): Primary | ICD-10-CM

## 2023-07-24 PROCEDURE — 96375 TX/PRO/DX INJ NEW DRUG ADDON: CPT | Performed by: NURSE PRACTITIONER

## 2023-07-24 PROCEDURE — 96415 CHEMO IV INFUSION ADDL HR: CPT | Performed by: NURSE PRACTITIONER

## 2023-07-24 PROCEDURE — 96413 CHEMO IV INFUSION 1 HR: CPT | Performed by: NURSE PRACTITIONER

## 2023-07-24 RX ORDER — ACETAMINOPHEN 325 MG/1
650 TABLET ORAL ONCE
Status: CANCELLED | OUTPATIENT
Start: 2024-06-18

## 2023-07-24 RX ORDER — EPINEPHRINE 1 MG/ML
0.3 INJECTION, SOLUTION INTRAMUSCULAR; SUBCUTANEOUS EVERY 5 MIN PRN
Status: CANCELLED | OUTPATIENT
Start: 2024-06-18

## 2023-07-24 RX ORDER — METHYLPREDNISOLONE SODIUM SUCCINATE 125 MG/2ML
125 INJECTION, POWDER, LYOPHILIZED, FOR SOLUTION INTRAMUSCULAR; INTRAVENOUS ONCE
Status: COMPLETED | OUTPATIENT
Start: 2023-07-24 | End: 2023-07-24

## 2023-07-24 RX ORDER — ALBUTEROL SULFATE 90 UG/1
1-2 AEROSOL, METERED RESPIRATORY (INHALATION)
Status: CANCELLED
Start: 2024-06-18

## 2023-07-24 RX ORDER — MEPERIDINE HYDROCHLORIDE 25 MG/ML
25 INJECTION INTRAMUSCULAR; INTRAVENOUS; SUBCUTANEOUS EVERY 30 MIN PRN
Status: CANCELLED | OUTPATIENT
Start: 2024-06-18

## 2023-07-24 RX ORDER — HEPARIN SODIUM (PORCINE) LOCK FLUSH IV SOLN 100 UNIT/ML 100 UNIT/ML
5 SOLUTION INTRAVENOUS
Status: CANCELLED | OUTPATIENT
Start: 2024-06-18

## 2023-07-24 RX ORDER — DIPHENHYDRAMINE HYDROCHLORIDE 50 MG/ML
50 INJECTION INTRAMUSCULAR; INTRAVENOUS
Status: CANCELLED
Start: 2024-06-18

## 2023-07-24 RX ORDER — METHYLPREDNISOLONE SODIUM SUCCINATE 125 MG/2ML
125 INJECTION, POWDER, LYOPHILIZED, FOR SOLUTION INTRAMUSCULAR; INTRAVENOUS ONCE
Status: CANCELLED | OUTPATIENT
Start: 2024-06-18

## 2023-07-24 RX ORDER — ACETAMINOPHEN 325 MG/1
650 TABLET ORAL ONCE
Status: COMPLETED | OUTPATIENT
Start: 2023-07-24 | End: 2023-07-24

## 2023-07-24 RX ORDER — HEPARIN SODIUM,PORCINE 10 UNIT/ML
5 VIAL (ML) INTRAVENOUS
Status: CANCELLED | OUTPATIENT
Start: 2024-06-18

## 2023-07-24 RX ORDER — DIPHENHYDRAMINE HCL 25 MG
50 CAPSULE ORAL ONCE
Status: COMPLETED | OUTPATIENT
Start: 2023-07-24 | End: 2023-07-24

## 2023-07-24 RX ORDER — DIPHENHYDRAMINE HCL 25 MG
50 CAPSULE ORAL ONCE
Status: CANCELLED | OUTPATIENT
Start: 2024-06-18

## 2023-07-24 RX ORDER — METHYLPREDNISOLONE SODIUM SUCCINATE 125 MG/2ML
125 INJECTION, POWDER, LYOPHILIZED, FOR SOLUTION INTRAMUSCULAR; INTRAVENOUS
Status: CANCELLED
Start: 2024-06-18

## 2023-07-24 RX ORDER — ALBUTEROL SULFATE 0.83 MG/ML
2.5 SOLUTION RESPIRATORY (INHALATION)
Status: CANCELLED | OUTPATIENT
Start: 2024-06-18

## 2023-07-24 RX ADMIN — Medication 250 ML: at 11:20

## 2023-07-24 RX ADMIN — METHYLPREDNISOLONE SODIUM SUCCINATE 125 MG: 125 INJECTION INTRAMUSCULAR; INTRAVENOUS at 11:00

## 2023-07-24 RX ADMIN — Medication 50 MG: at 10:48

## 2023-07-24 RX ADMIN — ACETAMINOPHEN 650 MG: 325 TABLET ORAL at 10:48

## 2023-07-24 NOTE — PROGRESS NOTES
Infusion Nursing Note:  Colletta Dwen Ward presents today for Ocrevus.    Patient seen by provider today: No   present during visit today: Not Applicable.    Note: Patient reports feeling well overall today. She states her weight is up more than she expected but notes swelling in her legs and states she hasn't been eating healthy at home.    Intravenous Access:  Peripheral IV placed. Difficulty starting IV today as patient jumps with IV starts, EMLA cream applied prior to second attempt and patient tolerated well. Future nursing staff should plan to use EMLA cream prior to IV access.    Treatment Conditions:  Biological Infusion Checklist:  ~~~ NOTE: If the patient answers yes to any of the questions below, hold the infusion and contact ordering provider or on-call provider.    Have you recently had an elevated temperature, fever, chills, productive cough, coughing for 3 weeks or longer or hemoptysis,  abnormal vital signs, night sweats,  chest pain or have you noticed a decrease in your appetite, unexplained weight loss or fatigue? No  Do you have any open wounds or new incisions? No  Do you have any upcoming hospitalizations or surgeries? Does not include esophagogastroduodenoscopy, colonoscopy, endoscopic retrograde cholangiopancreatography (ERCP), endoscopic ultrasound (EUS), dental procedures or joint aspiration/steroid injections No  Do you currently have any signs of illness or infection or are you on any antibiotics? No  Have you had any new, sudden or worsening abdominal pain? No  Have you or anyone in your household received a live vaccination in the past 4 weeks? Please note: No live vaccines while on biologic/chemotherapy until 6 months after the last treatment. Patient can receive the flu vaccine (shot only), pneumovax and the Covid vaccine. It is optimal for the patient to get these vaccines mid cycle, but they can be given at any time as long as it is not on the day of the infusion.  No  Have you recently been diagnosed with any new nervous system diseases (ie. Multiple sclerosis, Guillain [place], seizures, neurological changes) or cancer diagnosis? Are you on any form of radiation or chemotherapy? No  Are you pregnant or breast feeding or do you have plans of pregnancy in the future? No  Have you been having any signs of worsening depression or suicidal ideations?  (benlysta only) No  Have there been any other new onset medical symptoms? No  Have you had any new blood clots? (IVIG only) No    Post Infusion Assessment:  Patient tolerated infusion without incident.  Site patent and intact, free from redness, edema or discomfort.  No evidence of extravasations.  Access discontinued per protocol.  Biologic Infusion Post Education: Call the triage nurse at your clinic or seek medical attention if you have chills and/or temperature greater than or equal to 100.5, uncontrolled nausea/vomiting, diarrhea, constipation, dizziness, shortness of breath, chest pain, heart palpitations, weakness or any other new or concerning symptoms, questions or concerns.  You cannot have any live virus vaccines prior to or during treatment or up to 6 months post infusion.  If you have an upcoming surgery, medical procedure or dental procedure during treatment, this should be discussed with your ordering physician and your surgeon/dentist.  If you are having any concerning symptom, if you are unsure if you should get your next infusion or wish to speak to a provider before your next infusion, please call your care coordinator or triage nurse at your clinic to notify them so we can adequately serve you.     Discharge Plan:   Future appts have been reviewed and crosschecked with appt note and plan.  AVS to patient via [employer].  Patient will return at MD direction for next appointment.   Patient discharged in stable condition accompanied by: daughter.  Departure Mode: Ambulatory.      Maria L Ramirez RN BSN  OCN

## 2023-07-27 NOTE — TELEPHONE ENCOUNTER
Infused Ocrevus on 7/24/23. Will be due next in June 2024 (on every 11 months schedule).    Alma Cramer RN

## 2023-08-22 ENCOUNTER — DOCUMENTATION ONLY (OUTPATIENT)
Dept: NEUROLOGY | Facility: CLINIC | Age: 63
End: 2023-08-22
Payer: COMMERCIAL

## 2023-08-22 NOTE — PROGRESS NOTES
Called Patient to reschedule in person appointment, no answer left voicemail with clinic number to callback and reschedule at [phone].  Celestine Valencia EMT 08/22/2023 10:16AM    no

## 2023-08-29 ENCOUNTER — OFFICE VISIT (OUTPATIENT)
Dept: NEUROLOGY | Facility: CLINIC | Age: 63
End: 2023-08-29
Attending: PSYCHIATRY & NEUROLOGY
Payer: COMMERCIAL

## 2023-08-29 VITALS — DIASTOLIC BLOOD PRESSURE: 65 MMHG | OXYGEN SATURATION: 100 % | HEART RATE: 72 BPM | SYSTOLIC BLOOD PRESSURE: 97 MMHG

## 2023-08-29 DIAGNOSIS — Z91.81 RISK FOR FALLS: Primary | ICD-10-CM

## 2023-08-29 DIAGNOSIS — G35 MULTIPLE SCLEROSIS (H): ICD-10-CM

## 2023-08-29 PROCEDURE — 99215 OFFICE O/P EST HI 40 MIN: CPT | Performed by: PSYCHIATRY & NEUROLOGY

## 2023-08-29 PROCEDURE — G0463 HOSPITAL OUTPT CLINIC VISIT: HCPCS | Performed by: PSYCHIATRY & NEUROLOGY

## 2023-08-29 RX ORDER — AMANTADINE HYDROCHLORIDE 100 MG/1
100 CAPSULE, GELATIN COATED ORAL 2 TIMES DAILY
Qty: 60 CAPSULE | Refills: 11 | Status: SHIPPED | OUTPATIENT
Start: 2023-08-29

## 2023-08-29 ASSESSMENT — PAIN SCALES - GENERAL: PAINLEVEL: NO PAIN (0)

## 2023-08-29 NOTE — LETTER
8/29/2023       RE: Colletta Dwen Sorrell  [address]  [place] [address]     Dear Colleague,    Thank you for referring your patient, Colletta Dwen Sorrell, to the [facility] MULTIPLE SCLEROSIS CLINIC [place] at [facility]. Please see a copy of my visit note below.    ID: Colletta Sorrell is a 63-year-old woman who I follow for primary progressive multiple sclerosis.  She returns for routine follow-up.  I last saw her in February 2022.    HPI: Colletta tells me her energy level is down since a recent road trip to [place] during which she did not get as much sleep as usual.  She feels she has been generally stable from a neurologic perspective.  Her main residual symptom is gait impairment and leg weakness.  She uses a walker at all times.  She has not had any falls.  She has been doing PT, OT, pool therapy and massage therapy at [place] [facility], and feels that is helpful.  She uses a left AFO.  She is getting bars put into her shower and that is a good idea.  She is on ocrelizumab when she gets every 11 months, and had her most recent infusion in July of this year.  Next to be in June 2024.  She asked about clemastine for remyelination in MS and we discussed that in detail.  I told her that it has been shown in laboratory studies to stimulate differentiation of oligodendrocyte precursor cells.  There is one published study showing a very small but statistically significant improvement in visually potential latency with it.  There is no evidence that I am aware of, of it providing a clinically meaningful benefit in MS patients, and it tends to worsen fatigue.  I told her that in the absence of evidence of clinical benefit is not something I recommend at this time.  At last visit I prescribed amantadine for fatigue, but she does not think she ever took that and would like to try it.    Past Medical History:   Diagnosis Date    Cubital tunnel syndrome      Meralgia paraesthetica     MS (multiple sclerosis) (H)     Multinodular thyroid     Neuropathy        Current Outpatient Medications:     Alpha-Lipoic Acid 300 MG CAPS, TAKE ONE CAPSULE BY MOUTH EVERY DAY, Disp: 30 capsule, Rfl: 11    amantadine (SYMMETREL) 100 MG capsule, Take 1 capsule (100 mg) by mouth 2 times daily, Disp: 60 capsule, Rfl: 11    Ascorbic Acid (VITAMIN C PO), Take 1.5 teaspoonful by mouth daily , Disp: , Rfl:     B Complex Vitamins (VITAMIN B COMPLEX PO), Take 1 tablet by mouth as needed , Disp: , Rfl:     CALCIUM-VITAMIN D PO, Take 1 tablet by mouth daily Vit d 7,0000 unit, Disp: , Rfl:     cholecalciferol (VITAMIN D3) 5000 units (125 mcg) capsule, TAKE ONE CAPSULE BY MOUTH EVERY DAY, Disp: 30 capsule, Rfl: 11    Cyanocobalamin (VITAMIN B 12 PO), Take 2,500 mcg by mouth daily, Disp: , Rfl:     Green Tea, Radha sinensis, (GREEN TEA PO), Liquid form, Disp: , Rfl:     Incontinence Supply Disposable (DEPEND FIT-FLEX-WOMEN-M) MISC, USE 1-2 PER DAY, Disp: 36 each, Rfl: 3    MAGNESIUM OXIDE PO, Take 500 mg by mouth daily , Disp: , Rfl:     Multiple Vitamins-Minerals (MULTIVITAMIN ADULT) TABS, Take 1 tablet by mouth daily, Disp: 30 tablet, Rfl: 11    Ocrelizumab (OCREVUS IV), , Disp: , Rfl:     Omega-3 Fatty Acids (FISH OIL) 1200 MG capsule, Take 1,200 mg by mouth daily, Disp: , Rfl:     oxybutynin ER (DITROPAN XL) 15 MG 24 hr tablet, TAKE ONE TABLET BY MOUTH ONCE DAILY, Disp: 30 tablet, Rfl: 11    TURMERIC PO, , Disp: , Rfl:     Vitamin A 57741 units CAPS, , Disp: , Rfl:     benzonatate (TESSALON) 100 MG capsule, Take 1-2 capsules by mouth up to 3 times daily as needed for cough. (Patient not taking: Reported on 7/24/2023), Disp: 30 capsule, Rfl: 0    HEMP OIL OR EXTRACT OR OTHER CBD CANNABINOID, NOT MEDICAL CANNABIS,, , Disp: , Rfl:     Lactobacillus (ACIDOPHILUS) CAPS, TAKE ONE CAPSULE BY MOUTH ONCE DAILY (Patient not taking: Reported on 5/6/2022), Disp: 30 capsule, Rfl: 10    melatonin 3 MG  tablet, Take 3 mg by mouth nightly as needed  (Patient not taking: Reported on 5/6/2022), Disp: , Rfl:     methylPREDNISolone (MEDROL DOSEPAK) 4 MG tablet therapy pack, Follow Package Directions (Patient not taking: Reported on 5/6/2022), Disp: 21 tablet, Rfl: 0    Naltrexone POWD, Take 4.5 mg by mouth At Bedtime (Patient not taking: Reported on 5/6/2022), Disp: 1 Bottle, Rfl: 3    Probiotic Product (ACIDOPHILUS PROBIOTIC BLEND) CAPS, TAKE ONE CAPSULE BY MOUTH EVERY DAY (Patient not taking: Reported on 7/24/2023), Disp: 30 capsule, Rfl: 11    ranitidine (ZANTAC) 150 MG tablet, Take 1 tablet (150 mg) by mouth 2 times daily (Patient not taking: Reported on 5/6/2022), Disp: 60 tablet, Rfl: 3    Exam: She is alert and oriented.  Affect is bright and language functions are normal.  Cranial nerves are unremarkable.  Muscle tone is increased in the left ankle.  Shoulder adduction strength is normal but finger adduction is weak on the left at 4/5.  Flexion is 3/5 bilaterally.  Knee flexion is 5/5 on the right, 3+/5 on the left.  Ankle dorsiflexion is 4/5 on the right, 2/5 on the left.  Finger-nose-finger is normal, finger tapping slow and clumsy on the left.  Light touch is intact in all 4 limbs.  Reflexes are 2/4 and symmetric at the biceps, 3/4 and symmetric at the knees.  She has a spastic, ataxic, left hemiparetic gait, very impaired.  Candidate is a possible.    Impression: Primary progressive MS, stable on ocrelizumab.  I spent 41 minutes on her care on the date of service including chart review and face-to-face time.  We discussed the clemastine as well as safety monitoring with ocrelizumab.    Plan: Hep B panel and immunoglobulin levels today.  Next Ocrevus in June 2024.  Follow-up in 1 year.        Again, thank you for allowing me to participate in the care of your patient.      Sincerely,    Barrera Shannon MD

## 2023-08-29 NOTE — NURSING NOTE
Chief Complaint   Patient presents with    MS    RECHECK     Ms follow up      Vitals were taken and medications were reconciled.   Celestine Valencia, EMT  2:10 PM

## 2023-09-01 NOTE — PROGRESS NOTES
ID: Colletta Sorrell is a 63-year-old woman who I follow for primary progressive multiple sclerosis.  She returns for routine follow-up.  I last saw her in February 2022.    HPI: Colletta tells me her energy level is down since a recent road trip to [place] during which she did not get as much sleep as usual.  She feels she has been generally stable from a neurologic perspective.  Her main residual symptom is gait impairment and leg weakness.  She uses a walker at all times.  She has not had any falls.  She has been doing PT, OT, pool therapy and massage therapy at [place], and feels that is helpful.  She uses a left AFO.  She is getting bars put into her shower and that is a good idea.  She is on ocrelizumab when she gets every 11 months, and had her most recent infusion in July of this year.  Next to be in June 2024.  She asked about clemastine for remyelination in MS and we discussed that in detail.  I told her that it has been shown in laboratory studies to stimulate differentiation of oligodendrocyte precursor cells.  There is one published study showing a very small but statistically significant improvement in visually potential latency with it.  There is no evidence that I am aware of, of it providing a clinically meaningful benefit in MS patients, and it tends to worsen fatigue.  I told her that in the absence of evidence of clinical benefit is not something I recommend at this time.  At last visit I prescribed amantadine for fatigue, but she does not think she ever took that and would like to try it.    She requires a walker to walk, and it is in need of repair.    Past Medical History:   Diagnosis Date    Cubital tunnel syndrome     Meralgia paraesthetica     MS (multiple sclerosis) (H)     Multinodular thyroid     Neuropathy        Current Outpatient Medications:     Alpha-Lipoic Acid 300 MG CAPS, TAKE ONE CAPSULE BY MOUTH EVERY DAY, Disp: 30 capsule, Rfl: 11    amantadine (SYMMETREL) 100 MG capsule,  Take 1 capsule (100 mg) by mouth 2 times daily, Disp: 60 capsule, Rfl: 11    Ascorbic Acid (VITAMIN C PO), Take 1.5 teaspoonful by mouth daily , Disp: , Rfl:     B Complex Vitamins (VITAMIN B COMPLEX PO), Take 1 tablet by mouth as needed , Disp: , Rfl:     CALCIUM-VITAMIN D PO, Take 1 tablet by mouth daily Vit d 7,0000 unit, Disp: , Rfl:     cholecalciferol (VITAMIN D3) 5000 units (125 mcg) capsule, TAKE ONE CAPSULE BY MOUTH EVERY DAY, Disp: 30 capsule, Rfl: 11    Cyanocobalamin (VITAMIN B 12 PO), Take 2,500 mcg by mouth daily, Disp: , Rfl:     Green Tea, Radha sinensis, (GREEN TEA PO), Liquid form, Disp: , Rfl:     Incontinence Supply Disposable (DEPEND FIT-FLEX-WOMEN-M) MISC, USE 1-2 PER DAY, Disp: 36 each, Rfl: 3    MAGNESIUM OXIDE PO, Take 500 mg by mouth daily , Disp: , Rfl:     Multiple Vitamins-Minerals (MULTIVITAMIN ADULT) TABS, Take 1 tablet by mouth daily, Disp: 30 tablet, Rfl: 11    Ocrelizumab (OCREVUS IV), , Disp: , Rfl:     Omega-3 Fatty Acids (FISH OIL) 1200 MG capsule, Take 1,200 mg by mouth daily, Disp: , Rfl:     oxybutynin ER (DITROPAN XL) 15 MG 24 hr tablet, TAKE ONE TABLET BY MOUTH ONCE DAILY, Disp: 30 tablet, Rfl: 11    TURMERIC PO, , Disp: , Rfl:     Vitamin A 04192 units CAPS, , Disp: , Rfl:     benzonatate (TESSALON) 100 MG capsule, Take 1-2 capsules by mouth up to 3 times daily as needed for cough. (Patient not taking: Reported on 7/24/2023), Disp: 30 capsule, Rfl: 0    HEMP OIL OR EXTRACT OR OTHER CBD CANNABINOID, NOT MEDICAL CANNABIS,, , Disp: , Rfl:     Lactobacillus (ACIDOPHILUS) CAPS, TAKE ONE CAPSULE BY MOUTH ONCE DAILY (Patient not taking: Reported on 5/6/2022), Disp: 30 capsule, Rfl: 10    melatonin 3 MG tablet, Take 3 mg by mouth nightly as needed  (Patient not taking: Reported on 5/6/2022), Disp: , Rfl:     methylPREDNISolone (MEDROL DOSEPAK) 4 MG tablet therapy pack, Follow Package Directions (Patient not taking: Reported on 5/6/2022), Disp: 21 tablet, Rfl: 0    Naltrexone  POWD, Take 4.5 mg by mouth At Bedtime (Patient not taking: Reported on 5/6/2022), Disp: 1 Bottle, Rfl: 3    Probiotic Product (ACIDOPHILUS PROBIOTIC BLEND) CAPS, TAKE ONE CAPSULE BY MOUTH EVERY DAY (Patient not taking: Reported on 7/24/2023), Disp: 30 capsule, Rfl: 11    ranitidine (ZANTAC) 150 MG tablet, Take 1 tablet (150 mg) by mouth 2 times daily (Patient not taking: Reported on 5/6/2022), Disp: 60 tablet, Rfl: 3    Exam: She is alert and oriented.  Affect is bright and language functions are normal.  Cranial nerves are unremarkable.  Muscle tone is increased in the left ankle.  Shoulder adduction strength is normal but finger adduction is weak on the left at 4/5.  Flexion is 3/5 bilaterally.  Knee flexion is 5/5 on the right, 3+/5 on the left.  Ankle dorsiflexion is 4/5 on the right, 2/5 on the left.  Finger-nose-finger is normal, finger tapping slow and clumsy on the left.  Light touch is intact in all 4 limbs.  Reflexes are 2/4 and symmetric at the biceps, 3/4 and symmetric at the knees.  She has a spastic, ataxic, left hemiparetic gait, very impaired. Tandem gait is impossible.    Impression: Primary progressive MS, stable on ocrelizumab.  I spent 41 minutes on her care on the date of service including chart review and face-to-face time.  We discussed the clemastine as well as safety monitoring with ocrelizumab.    Plan: Hep B panel and immunoglobulin levels today.  Next Ocrevus in June 2024.  Follow-up in 1 year.    Addendum:  Alicia has mobility limitations and gait impairment secondary to her MS.  These limitations significantly impair her ability to participate in one or more mobility-related activities of daily living in her home. Alicia is able to safely use a walker and her functional mobility deficits could be sufficiently resolved with the use of a walker.     Addendum:  She has neurogenic bladder with incontinence (from MS), needs pads e.g. Depends, 2 per day.

## 2023-09-02 ENCOUNTER — HEALTH MAINTENANCE LETTER (OUTPATIENT)
Age: 63
End: 2023-09-02

## 2023-09-25 DIAGNOSIS — G35 MULTIPLE SCLEROSIS (H): ICD-10-CM

## 2023-09-25 RX ORDER — OXYBUTYNIN CHLORIDE 15 MG/1
TABLET, EXTENDED RELEASE ORAL
Qty: 30 TABLET | Refills: 11 | Status: SHIPPED | OUTPATIENT
Start: 2023-09-25 | End: 2024-08-27

## 2023-09-25 NOTE — TELEPHONE ENCOUNTER
Received refill request for oxybutynin from [facility]; Patient was last seen in Aug 2023 and has follow up appointment in Aug 2024 with Dr Shannon. Refilled per MS refill protocol.    Alma Cramer RN

## 2023-09-27 ENCOUNTER — DOCUMENTATION ONLY (OUTPATIENT)
Dept: NEUROLOGY | Facility: CLINIC | Age: 63
End: 2023-09-27
Payer: COMMERCIAL

## 2023-09-27 NOTE — PROGRESS NOTES
Harry certification forms received from [facility], forms placed in Dr. Shannon's folder for review and signature.  Celestine Valencia EMT 09/27/2023 1:41PM

## 2023-10-03 ENCOUNTER — MEDICAL CORRESPONDENCE (OUTPATIENT)
Dept: HEALTH INFORMATION MANAGEMENT | Facility: CLINIC | Age: 63
End: 2023-10-03
Payer: COMMERCIAL

## 2023-10-03 NOTE — PROGRESS NOTES
Walker clarification forms have been signed and faxed back at [phone].  Celestine Valencia EMT 10/03/2023 11:12AM

## 2023-10-04 ENCOUNTER — LAB (OUTPATIENT)
Dept: LAB | Facility: CLINIC | Age: 63
End: 2023-10-04
Payer: COMMERCIAL

## 2023-10-04 DIAGNOSIS — G35 MULTIPLE SCLEROSIS (H): ICD-10-CM

## 2023-10-04 LAB
HBV CORE AB SERPL QL IA: NONREACTIVE
HBV SURFACE AG SERPL QL IA: NONREACTIVE

## 2023-10-04 PROCEDURE — 86704 HEP B CORE ANTIBODY TOTAL: CPT

## 2023-10-04 PROCEDURE — 82784 ASSAY IGA/IGD/IGG/IGM EACH: CPT | Mod: 59

## 2023-10-04 PROCEDURE — 82784 ASSAY IGA/IGD/IGG/IGM EACH: CPT

## 2023-10-04 PROCEDURE — 87340 HEPATITIS B SURFACE AG IA: CPT

## 2023-10-04 PROCEDURE — 36415 COLL VENOUS BLD VENIPUNCTURE: CPT

## 2023-10-05 LAB
IGA SERPL-MCNC: 183 MG/DL (ref 84–499)
IGG SERPL-MCNC: 1212 MG/DL (ref 610–1616)
IGM SERPL-MCNC: 27 MG/DL (ref 35–242)

## 2023-11-13 DIAGNOSIS — G35 MULTIPLE SCLEROSIS (H): ICD-10-CM

## 2023-11-13 NOTE — TELEPHONE ENCOUNTER
Received refill request for incontinence supply from [facility]; Patient was last seen in Aug 2023 and has follow up appointment in Aug 2024 with Dr Shannon. Rx routed to Dr Shannon.    Alma Cramer RN

## 2023-11-22 ENCOUNTER — TRANSFERRED RECORDS (OUTPATIENT)
Dept: HEALTH INFORMATION MANAGEMENT | Facility: CLINIC | Age: 63
End: 2023-11-22
Payer: COMMERCIAL

## 2023-11-29 ENCOUNTER — DOCUMENTATION ONLY (OUTPATIENT)
Dept: NEUROLOGY | Facility: CLINIC | Age: 63
End: 2023-11-29
Payer: COMMERCIAL

## 2023-11-29 NOTE — PROGRESS NOTES
Initial evaluation for physical therapy has been received from [facility] forms placed in Dr. Shannon's folder for review and signature.   Celestine Valencia EMT 11/29/2023 11:22AM

## 2023-12-05 NOTE — PROGRESS NOTES
Evaluation has been signed and faxed back at [phone].  Celestine Valencia EMT 12/05/2023 11:41AM

## 2024-01-08 ENCOUNTER — DOCUMENTATION ONLY (OUTPATIENT)
Dept: NEUROLOGY | Facility: CLINIC | Age: 64
End: 2024-01-08
Payer: COMMERCIAL

## 2024-01-08 NOTE — PROGRESS NOTES
[employer] application received, forms are going to be placed in Dr. Shannon's folder for review and signature.   Celestine KERR 01/08/2024 2:15PM

## 2024-01-09 NOTE — CONFIDENTIAL NOTE
NEUROSURGERY- NEW PREVISIT PLANNING       Record Status/Location     Referring Provider Appt Note Self   Diagnosis Appt Note Back pain   MRI (HEAD, NECK, SPINE) Pacs Cervical 6/23/21 [facility]   CT Pacs Pelvis 10/2/22 [facility]   X-ray Pacs Cervical 10/27/17 [facility]   INJECTION Img 11/21/17- Cervical epidural steroid injection    PHYSICAL THERAPY Encounters- Neck [phone]   SURGERY Na

## 2024-01-18 ENCOUNTER — OFFICE VISIT (OUTPATIENT)
Dept: NEUROSURGERY | Facility: CLINIC | Age: 64
End: 2024-01-18
Payer: COMMERCIAL

## 2024-01-18 ENCOUNTER — PRE VISIT (OUTPATIENT)
Dept: NEUROSURGERY | Facility: CLINIC | Age: 64
End: 2024-01-18

## 2024-01-18 VITALS — HEART RATE: 71 BPM | OXYGEN SATURATION: 99 % | DIASTOLIC BLOOD PRESSURE: 71 MMHG | SYSTOLIC BLOOD PRESSURE: 147 MMHG

## 2024-01-18 DIAGNOSIS — G35 MULTIPLE SCLEROSIS (H): ICD-10-CM

## 2024-01-18 DIAGNOSIS — R20.2 PARESTHESIA OF LEFT UPPER EXTREMITY: ICD-10-CM

## 2024-01-18 DIAGNOSIS — M54.12 CERVICAL RADICULOPATHY: Primary | ICD-10-CM

## 2024-01-18 PROCEDURE — 99203 OFFICE O/P NEW LOW 30 MIN: CPT | Performed by: NURSE PRACTITIONER

## 2024-01-18 ASSESSMENT — PAIN SCALES - GENERAL: PAINLEVEL: NO PAIN (1)

## 2024-01-18 NOTE — PATIENT INSTRUCTIONS
Order for an updated cervical spine MRI. They will call you to schedule. I will call with the results and next steps.     Please call our clinic if symptoms persist, change, or worsen at any time.    [facility]

## 2024-01-18 NOTE — LETTER
1/18/2024         RE: Colletta Dwen Sorrell  [address]  [place] [address]        Dear Colleague,    Thank you for referring your patient, Colletta Dwen Sorrell, to the [facility] NEUROLOGICAL CLINIC [place]. Please see a copy of my visit note below.    [facility] Neurosurgery Clinic Visit      CC: tingling in neck, left arm and hand     Primary Care Provider: [facility]    Reason For Visit:   Self referral       HPI: Colletta Dwen Sorrell is a 63 year old female with history of MS, follows with Neurology, who presents for evaluation of tingling in neck, left arm, and hand. Patient reports symptoms started about 7 years ago and worsened over the past 2 year. Today, patient reports paresthesias in left sided neck that radiates to left shoulder, arm, and entire hand. She reports feeling some weakness in left arm and hand with lifting or overhead reaching; she states this may be related to MS. Denies any falls, foot drop, saddle anesthesia, or bladder/bowel incontinence. Patient has tried home exercises for symptom management. She follows with PT and chiropractic for MS. She would like to try an injection if possible.     Current pain: 1/10     Past Medical History:   Diagnosis Date     Cubital tunnel syndrome      Meralgia paraesthetica      MS (multiple sclerosis) (H)      Multinodular thyroid      Neuropathy       No past surgical history on file.    Current Outpatient Medications   Medication     Alpha-Lipoic Acid 300 MG CAPS     amantadine (SYMMETREL) 100 MG capsule     Ascorbic Acid (VITAMIN C PO)     B Complex Vitamins (VITAMIN B COMPLEX PO)     CALCIUM-VITAMIN D PO     cholecalciferol (VITAMIN D3) 5000 units (125 mcg) capsule     Cyanocobalamin (VITAMIN B 12 PO)     Green Tea, Radha sinensis, (GREEN TEA PO)     Incontinence Supply Disposable (DEPEND FIT-FLEX-WOMEN-M) MISC     melatonin 3 MG tablet     Multiple Vitamins-Minerals  (MULTIVITAMIN ADULT) TABS     Omega-3 Fatty Acids (FISH OIL) 1200 MG capsule     TURMERIC PO     Vitamin A 77483 units CAPS     benzonatate (TESSALON) 100 MG capsule     HEMP OIL OR EXTRACT OR OTHER CBD CANNABINOID, NOT MEDICAL CANNABIS,     Lactobacillus (ACIDOPHILUS) CAPS     MAGNESIUM OXIDE PO     methylPREDNISolone (MEDROL DOSEPAK) 4 MG tablet therapy pack     Naltrexone POWD     Ocrelizumab (OCREVUS IV)     oxyBUTYnin ER (DITROPAN XL) 15 MG 24 hr tablet     Probiotic Product (ACIDOPHILUS PROBIOTIC BLEND) CAPS     ranitidine (ZANTAC) 150 MG tablet     No current facility-administered medications for this visit.       Allergies   Allergen Reactions     Compazine [Prochlorperazine] Swelling     Swelling of tongue about 25 years ago       Social History     Socioeconomic History     Marital status: Single   Tobacco Use     Smoking status: Former     Packs/day: .1     Types: Cigarettes     Quit date: 2017     Years since quittin.0     Smokeless tobacco: Never   Substance and Sexual Activity     Alcohol use: Yes     Alcohol/week: 1.0 standard drink of alcohol     Types: 1 Glasses of wine per week     Comment: EVERY 2 WEEKS     Drug use: No     Sexual activity: Not Currently       Family History   Problem Relation Age of Onset     Multiple Sclerosis Father        ROS: 10 point ROS neg other than the symptoms noted above in the HPI.    Vital Signs: BP (!) 147/71   Pulse 71   SpO2 99%     Neurological Examination:  Awake  Alert  Oriented x 3  Speech clear    Motor exam: Baseline left sided weakness due to MS  Shoulder Abduction  Right:  5/5   Left:  4+/5  Biceps                      Right:  5/5   Left:  4+/5  Triceps                     Right:  5/5   Left:  4+/5  Wrist Extensors        Right:  5/5   Left:  5/5  Wrist Flexors            Right:  5/5   Left:  5/5  Intrinsics                   Right:  5/5   Left:  5-/5    Iliopsoas  (hip flexion)               Right: 5/5  Left:  3/5  Quadriceps  (knee extension)        Right:  5/5  Left:  3+/5  Hamstrings  (knee flexion)            Right:  5/5  Left:  3+/5  Gastroc Soleus  (PF)                          Right:  5/5  Left:  3/5  Tibialis Ant  (DF)                          Right:  5/5  Left:  2/5  EHL                          Right:  5/5  Left:  3/5         Sensation normal to BUE and BLE  Negative Maci sign bilaterally.  Negative clonus bilaterally.     Musculoskeletal:  Gait: Able to stand from a seated position. Impaired gait due to MS, ambulates with walker.   Cervical examination reveals good range of motion.    Imaging:   MR CERVICAL SPINE W/O & W CONTRAST 6/23/2021 3:51 PM                                                    Impression:   1. Grossly stable T2 hyperintense foci within the cervical spinal cord  compared to prior examination dated 11/1/2017. No abnormal contrast  enhancement within the cervical spinal cord to suggest active  demyelination.  2. Multilevel cervical spondylosis, most pronounced at 5-6 with severe  left neural foraminal stenosis, progressed since 2017.    Assessment/Plan:   63 year old female with history of MS who presents for evaluation of chronic paresthesias in left sided neck that radiates to left shoulder, arm, and entire hand. Cervical spine MRI from 2021 reveals C5-6 left foraminal stenosis. We discussed symptoms, imaging, and next steps.      - Updated cervical spine MRI ordered for further evaluation  - Will call patient with results and next steps  - Patient would like to try ALEJO pending MRI results     Advised patient to call our clinic with any questions or concerns.   Patient voiced understanding and agreement.      Karen Hunter CNP  [facility] [place] [address]  Tel [phone]  Pager [phone]        Again, thank you for allowing me to participate in the care of your patient.        Sincerely,        Karen Hunter, NICOLE

## 2024-01-18 NOTE — NURSING NOTE
"Colletta Dwen Sorrell is a 63 year old female who presents for:  Chief Complaint   Patient presents with    Referral     Discomfort in neck and down left arm. Not interested in surgical options        Initial Vitals:  BP (!) 147/71   Pulse 71   SpO2 99%  Estimated body mass index is 26.61 kg/m  as calculated from the following:    Height as of 5/31/22: 5' 5\" (1.651 m).    Weight as of 7/24/23: 159 lb 14.4 oz (72.5 kg).. There is no height or weight on file to calculate BSA. BP completed using cuff size: regular  No Pain (1)    Nursing Comments:     Yusuf Lopez    "

## 2024-01-18 NOTE — PROGRESS NOTES
[facility]surgery Clinic Visit      CC: tingling in neck, left arm and hand     Primary Care Provider: CHANCE Florentino [facility]    Reason For Visit:   Self referral       HPI: Colletta Dwen Sorrell is a 63 year old female with history of MS, follows with Neurology, who presents for evaluation of tingling in neck, left arm, and hand. Patient reports symptoms started about 7 years ago and worsened over the past 2 year. Today, patient reports paresthesias in left sided neck that radiates to left shoulder, arm, and entire hand. She reports feeling some weakness in left arm and hand with lifting or overhead reaching; she states this may be related to MS. Denies any falls, foot drop, saddle anesthesia, or bladder/bowel incontinence. Patient has tried home exercises for symptom management. She follows with PT and chiropractic for MS. She would like to try an injection if possible.     Current pain: 1/10     Past Medical History:   Diagnosis Date    Cubital tunnel syndrome     Meralgia paraesthetica     MS (multiple sclerosis) (H)     Multinodular thyroid     Neuropathy       No past surgical history on file.    Current Outpatient Medications   Medication    Alpha-Lipoic Acid 300 MG CAPS    amantadine (SYMMETREL) 100 MG capsule    Ascorbic Acid (VITAMIN C PO)    B Complex Vitamins (VITAMIN B COMPLEX PO)    CALCIUM-VITAMIN D PO    cholecalciferol (VITAMIN D3) 5000 units (125 mcg) capsule    Cyanocobalamin (VITAMIN B 12 PO)    Green Tea, Radha sinensis, (GREEN TEA PO)    Incontinence Supply Disposable (DEPEND FIT-FLEX-WOMEN-M) MISC    melatonin 3 MG tablet    Multiple Vitamins-Minerals (MULTIVITAMIN ADULT) TABS    Omega-3 Fatty Acids (FISH OIL) 1200 MG capsule    TURMERIC PO    Vitamin A 28762 units CAPS    benzonatate (TESSALON) 100 MG capsule    HEMP OIL OR EXTRACT OR OTHER CBD CANNABINOID, NOT MEDICAL CANNABIS,    Lactobacillus (ACIDOPHILUS) CAPS    MAGNESIUM OXIDE PO     methylPREDNISolone (MEDROL DOSEPAK) 4 MG tablet therapy pack    Naltrexone POWD    Ocrelizumab (OCREVUS IV)    oxyBUTYnin ER (DITROPAN XL) 15 MG 24 hr tablet    Probiotic Product (ACIDOPHILUS PROBIOTIC BLEND) CAPS    ranitidine (ZANTAC) 150 MG tablet     No current facility-administered medications for this visit.       Allergies   Allergen Reactions    Compazine [Prochlorperazine] Swelling     Swelling of tongue about 25 years ago       Social History     Socioeconomic History    Marital status: Single   Tobacco Use    Smoking status: Former     Packs/day: .1     Types: Cigarettes     Quit date: 2017     Years since quittin.0    Smokeless tobacco: Never   Substance and Sexual Activity    Alcohol use: Yes     Alcohol/week: 1.0 standard drink of alcohol     Types: 1 Glasses of wine per week     Comment: EVERY 2 WEEKS    Drug use: No    Sexual activity: Not Currently       Family History   Problem Relation Age of Onset    Multiple Sclerosis Father        ROS: 10 point ROS neg other than the symptoms noted above in the HPI.    Vital Signs: BP (!) 147/71   Pulse 71   SpO2 99%     Neurological Examination:  Awake  Alert  Oriented x 3  Speech clear    Motor exam: Baseline left sided weakness due to MS  Shoulder Abduction  Right:  5/5   Left:  4+/5  Biceps                      Right:  5/5   Left:  4+/5  Triceps                     Right:  5/5   Left:  4+/5  Wrist Extensors        Right:  5/5   Left:  5/5  Wrist Flexors            Right:  5/5   Left:  5/5  Intrinsics                   Right:  5/5   Left:  5-/5    Iliopsoas  (hip flexion)               Right: 5/5  Left:  3/5  Quadriceps  (knee extension)       Right:  5/5  Left:  3+/5  Hamstrings  (knee flexion)            Right:  5/5  Left:  3+/5  Gastroc Soleus  (PF)                          Right:  5/5  Left:  3/5  Tibialis Ant  (DF)                          Right:  5/5  Left:  2/5  EHL                          Right:  5/5  Left:  3/5         Sensation normal to  BUE and BLE  Negative Maci sign bilaterally.  Negative clonus bilaterally.     Musculoskeletal:  Gait: Able to stand from a seated position. Impaired gait due to MS, ambulates with walker.   Cervical examination reveals good range of motion.    Imaging:   MR CERVICAL SPINE W/O & W CONTRAST 6/23/2021 3:51 PM                                                    Impression:   1. Grossly stable T2 hyperintense foci within the cervical spinal cord  compared to prior examination dated 11/1/2017. No abnormal contrast  enhancement within the cervical spinal cord to suggest active  demyelination.  2. Multilevel cervical spondylosis, most pronounced at 5-6 with severe  left neural foraminal stenosis, progressed since 2017.    Assessment/Plan:   63 year old female with history of MS who presents for evaluation of chronic paresthesias in left sided neck that radiates to left shoulder, arm, and entire hand. Cervical spine MRI from 2021 reveals C5-6 left foraminal stenosis. We discussed symptoms, imaging, and next steps.      - Updated cervical spine MRI ordered for further evaluation  - Will call patient with results and next steps  - Patient would like to try ALEJO pending MRI results     Advised patient to call our clinic with any questions or concerns.   Patient voiced understanding and agreement.      Karen Hunter [facility]  [address] [address]  Tel [phone]  Pager [phone]

## 2024-01-30 ENCOUNTER — ANCILLARY PROCEDURE (OUTPATIENT)
Dept: MRI IMAGING | Facility: CLINIC | Age: 64
End: 2024-01-30
Attending: NURSE PRACTITIONER
Payer: COMMERCIAL

## 2024-01-30 DIAGNOSIS — G35 MULTIPLE SCLEROSIS (H): ICD-10-CM

## 2024-01-30 DIAGNOSIS — M54.12 CERVICAL RADICULOPATHY: ICD-10-CM

## 2024-01-30 PROCEDURE — A9585 GADOBUTROL INJECTION: HCPCS | Performed by: RADIOLOGY

## 2024-01-30 PROCEDURE — 72156 MRI NECK SPINE W/O & W/DYE: CPT | Mod: TC | Performed by: RADIOLOGY

## 2024-01-30 RX ORDER — GADOBUTROL 604.72 MG/ML
7.5 INJECTION INTRAVENOUS ONCE
Status: COMPLETED | OUTPATIENT
Start: 2024-01-30 | End: 2024-01-30

## 2024-01-30 RX ADMIN — GADOBUTROL 7 ML: 604.72 INJECTION INTRAVENOUS at 09:02

## 2024-02-02 ENCOUNTER — TELEPHONE (OUTPATIENT)
Dept: PHYSICAL MEDICINE AND REHAB | Facility: CLINIC | Age: 64
End: 2024-02-02
Payer: COMMERCIAL

## 2024-02-02 DIAGNOSIS — M54.12 CERVICAL RADICULAR PAIN: Primary | ICD-10-CM

## 2024-02-02 RX ORDER — LIDOCAINE 40 MG/G
CREAM TOPICAL
Status: CANCELLED | OUTPATIENT
Start: 2024-02-02

## 2024-02-02 RX ORDER — DIAZEPAM 5 MG
5 TABLET ORAL ONCE
Status: CANCELLED | OUTPATIENT
Start: 2024-02-02 | End: 2024-02-02

## 2024-02-02 NOTE — TELEPHONE ENCOUNTER
Patient is scheduled for surgery with Dr. Frank    Spoke with: patient    Date of Surgery: 2/26/24    [place] [facility]    Informed patient they will need an adult  yes    Additional comments: Patient is aware of date and time of the procedure.        Yelena Llanos MA on 2/2/2024 at 3:49 PM

## 2024-02-12 ENCOUNTER — TELEPHONE (OUTPATIENT)
Dept: NEUROLOGY | Facility: CLINIC | Age: 64
End: 2024-02-12
Payer: COMMERCIAL

## 2024-02-12 DIAGNOSIS — G35 MULTIPLE SCLEROSIS (H): Primary | ICD-10-CM

## 2024-02-12 DIAGNOSIS — R32 URINARY INCONTINENCE: ICD-10-CM

## 2024-02-12 NOTE — TELEPHONE ENCOUNTER
[facility] Call Center    Phone Message    May a detailed message be left on voicemail: yes     Reason for Call: Other: Patient is returning call and states needs a prescription in chart notes in epic for Depends. Patient is requesting this to be done as soon as possible. Please contact patient with questions [phone]     Action Taken: Message routed to:  [facility] Neurology    Travel Screening: Not Applicable

## 2024-02-12 NOTE — TELEPHONE ENCOUNTER
Spoke with [facility] and with [facility]. The pharmacy can no longer provide the Depends (cannot bill for this) and pt will need to go through home medical. Needs DME order and will need documentation showing need.    DME order pended to Dr Shannon.     Spoke with Colletta. She reports she uses approximately 2 Depends per day. Uses a pull-on type. She uses Depends Flex size medium. She would like high absorbency.     Alma Cramer RN

## 2024-02-12 NOTE — TELEPHONE ENCOUNTER
[facility] Call Center    Phone Message    May a detailed message be left on voicemail: yes     Reason for Call: Patient has been ordering Depends from pharmacy with no problem for years.  Now pharmacy said they need a prescription .  Please put a prescription in her my chart per patient or call patient for more information      Action Taken: [facility]    Travel Screening: Not Applicable

## 2024-02-15 NOTE — TELEPHONE ENCOUNTER
Dr Shannon signed DME order on 2/13 and added addendum to his 8/29/23 office visit note about the need for urinary incontinence supply. Spoke with [facility]. They will call pt to discuss options, may not have Depends Flex available but have other options.     Alma Cramer RN

## 2024-02-15 NOTE — TELEPHONE ENCOUNTER
[facility] Call Center    Phone Message    May a detailed message be left on voicemail: yes     Reason for Call: Other: Pt is requesting a call back regarding the prescription for depends to be sent in ASAP. Pt is getting upset that this hasn't been approved.      Please call to advise at # [phone]    Action Taken: Message routed to:  [facility] Neurology     Travel Screening: Not Applicable

## 2024-02-21 ENCOUNTER — TRANSFERRED RECORDS (OUTPATIENT)
Dept: HEALTH INFORMATION MANAGEMENT | Facility: CLINIC | Age: 64
End: 2024-02-21
Payer: COMMERCIAL

## 2024-02-26 ENCOUNTER — ANCILLARY PROCEDURE (OUTPATIENT)
Dept: RADIOLOGY | Facility: AMBULATORY SURGERY CENTER | Age: 64
End: 2024-02-26
Attending: PHYSICAL MEDICINE & REHABILITATION
Payer: COMMERCIAL

## 2024-02-26 ENCOUNTER — HOSPITAL ENCOUNTER (OUTPATIENT)
Facility: AMBULATORY SURGERY CENTER | Age: 64
Discharge: HOME | End: 2024-02-26
Attending: PHYSICAL MEDICINE & REHABILITATION | Admitting: PHYSICAL MEDICINE & REHABILITATION
Payer: COMMERCIAL

## 2024-02-26 VITALS
OXYGEN SATURATION: 99 % | DIASTOLIC BLOOD PRESSURE: 61 MMHG | RESPIRATION RATE: 18 BRPM | WEIGHT: 159 LBS | HEART RATE: 74 BPM | SYSTOLIC BLOOD PRESSURE: 117 MMHG | HEIGHT: 65 IN | BODY MASS INDEX: 26.49 KG/M2 | TEMPERATURE: 98 F

## 2024-02-26 DIAGNOSIS — M54.12 CERVICAL RADICULAR PAIN: ICD-10-CM

## 2024-02-26 PROCEDURE — 62321 NJX INTERLAMINAR CRV/THRC: CPT

## 2024-02-26 RX ORDER — DIAZEPAM 5 MG
5 TABLET ORAL ONCE
Status: COMPLETED | OUTPATIENT
Start: 2024-02-26 | End: 2024-02-26

## 2024-02-26 RX ORDER — LIDOCAINE 40 MG/G
CREAM TOPICAL
Status: DISCONTINUED | OUTPATIENT
Start: 2024-02-26 | End: 2024-02-27 | Stop reason: HOSPADM

## 2024-02-26 RX ORDER — IOPAMIDOL 408 MG/ML
INJECTION, SOLUTION INTRATHECAL PRN
Status: DISCONTINUED | OUTPATIENT
Start: 2024-02-26 | End: 2024-02-26 | Stop reason: HOSPADM

## 2024-02-26 RX ORDER — LIDOCAINE HYDROCHLORIDE 10 MG/ML
INJECTION, SOLUTION EPIDURAL; INFILTRATION; INTRACAUDAL; PERINEURAL PRN
Status: DISCONTINUED | OUTPATIENT
Start: 2024-02-26 | End: 2024-02-26 | Stop reason: HOSPADM

## 2024-02-26 RX ORDER — DEXAMETHASONE SODIUM PHOSPHATE 10 MG/ML
INJECTION INTRAMUSCULAR; INTRAVENOUS PRN
Status: DISCONTINUED | OUTPATIENT
Start: 2024-02-26 | End: 2024-02-26 | Stop reason: HOSPADM

## 2024-02-26 RX ADMIN — DIAZEPAM 5 MG: 5 TABLET ORAL at 10:26

## 2024-02-26 NOTE — DISCHARGE INSTRUCTIONS
Home Care Instructions after an Epidural Steroid Pain Injection    A lumbar epidural steroid injection delivers steroid medication directly into the area that may be causing your lower back pain and/or leg pain. A cervical or thoracic epidural steroid injection delivers steroids into the epidural space surrounding spinal nerve roots to help relieve pain in the upper spine/neck.    Activity  -Rest today  -Do not work today  -Resume normal activity tomorrow  -DO NOT shower for 24 hours  -DO NOT remove bandaid for 24 hours    Pain  -You may experience soreness at the injection site for one or two days  -You may use an ice pack for 20 minutes every 2 hours for the first 24 hours  -You may use a heating pad after the first 24 hours  -You may use Tylenol (acetaminophen) every 4 hours or other pain medicines as     directed by your physician    You may experience numbness radiating into your legs or arms (depending on the procedure location). This numbness may last several hours. Until sensation returns to normal; please use caution in walking, climbing stairs, and stepping out of your vehicle, etc.    DID YOU RECEIVE SEDATION TODAY?  Yes    Safety  Sedation medicine, if given, may remain active for many hours. It is important for the next 24 hours that you do not:  -Drive a car  -Operate machines or power tools  -Consume alcohol, including beer  -Sign any important papers or legal documents      Common side effects of steroids:  Not everyone will experience corticosteroid side effects. If side effects are experienced, they will gradually subside in the 7-10 day period following an injection. Most common side effects include:  -Flushed face and/or chest  -Feeling of warmth, particularly in the face but could be an overall feeling of warmth  -Increased blood sugar in diabetic patients  -Menstrual irregularities my occur. If taking hormone-based birth control an alternate method of birth control is recommended  -Sleep  disturbances and/or mood swings are possible  -Leg cramps    Please contact us if you have:  -Severe pain  -Fever more than 101.5 degrees Fahrenheit  -Signs of infection at the injection site (redness, swelling, or drainage)    FOR PAIN CENTER PATIENTS:  If you have questions, please contact the Pain Clinic at [phone] Option #1 between the hours of 7:00 am and 3:00 pm Monday through Friday. After office hours you can contact the on call provider by dialing [phone]. If you need immediate attention, we recommend that you go to a hospital emergency room or dial [phone].      FOR PM&R PATIENTS:  For patients seen by the PM and R service, please call [phone]. (Monday through Friday 8a-5p.  After business hours and weekends call [phone] and ask for the PM and R doctor on call). If you need to fax a pain diary to PM&R the fax number is [phone]. If you are unable to fax, uploading to [facility] is encouraged, then send to provider. If you have procedure scheduling questions please call [phone] Option #2.

## 2024-02-26 NOTE — OP NOTE
PROCEDURE NOTE: Cervical Interlaminar Epidural Steroid Injection    PROCEDURE DATE: 2024    PATIENT NAME: Colletta Dwen Sorrell  YOB: 1960    ATTENDING PHYSICIAN: Dorothy Frank MD    PREOPERATIVE DIAGNOSIS: Cervical radicular pain  POSTOPERATIVE DIAGNOSIS: Same    PROCEDURE PERFORMED: Interlaminar Epidural Steroid Injection at the C7-T1 level, with a Left paramedian approach under fluoroscopic guidance    ESTIMATED BLOOD LOSS:  None    FLUOROSCOPY WAS USED.    INDICATIONS FOR PROCEDURE:   Colletta Dwen Sorrell is a 64 year old female with a clinical picture consistent with the above-mentioned diagnosis, resulting in radicular pain to the left upper extremity.    PROCEDURE AND FINDINGS:   Colletta Dwen Sorrell was greeted in the pre-procedure holding area. The risk, benefits and alternatives to the procedure were again reviewed with the patient and written informed consent was placed in the chart. An IV line was placed.  A 500 mL bag of NS was not connected to the patient. She was taken to the procedure room and positioned prone on the fluoroscopy table.  Routine monitors were applied including blood pressure cuff, and pulse oximetry. Prior to the procedure a time out was completed, verifying correct patient, procedure, site, positioning, and implants and/or special equipment.     An AP fluoroscpic  film was taken to identify the C7 and T1 vertebral bodies, as well as the C7-T1 interspace. The skin was prepped with chlorhexidine and draped in the usual sterile fashion. The skin and subcutaneous tissue overlying the C7-T1 interspace was anesthetized using a 27-guage 1-1/4 inch needle with 1% preservative-free lidocaine for a total volume of 4 mls. Then an 20 cm Tuohy needle was advanced utilizing AP, contralateral oblique (45*), and lateral fluoroscopic views into the C7-T1 interlaminar space. Once the needle tip was engaged in the ligamentum flavum, a loss of resistance syringe was  attached and loss of resistance to saline.     After negative aspiration, 1mls of Isovue-M contrast was injected under AP view with live fluoroscopy and confirmed adequate spread along the epidural space. There was no evidence of intravascular uptake or intrathecal spread on imaging. Contralateral oblique/lateral view(s) were also taken confirming adequate epidural spread.     At this point, after negative aspiration, a total 4mL volume of treatment injectate, consisting of 1mL of 10mg/mL dexamethasone, 3mL of PFNS was injected easily.  The needle was then flushed with 0.3 ml of PFNS, restyletted  and removed. The needle insertion site was dressed appropriately.     Colletta was taken to the recovery room where she was monitored for a brief period of time. She tolerated the procedure well and was discharged home in stable condition with post procedural instructions.     Follow-up will be in neurosurgery clinic with referring provider: BIN Hunter CNP.    COMPLICATIONS: None    COMMENTS: None

## 2024-03-04 ENCOUNTER — THERAPY VISIT (OUTPATIENT)
Dept: OCCUPATIONAL THERAPY | Facility: CLINIC | Age: 64
End: 2024-03-04
Attending: PSYCHIATRY & NEUROLOGY
Payer: MEDICAID

## 2024-03-04 DIAGNOSIS — I89.0 LYMPHEDEMA: Chronic | ICD-10-CM

## 2024-03-04 PROCEDURE — 97165 OT EVAL LOW COMPLEX 30 MIN: CPT | Mod: GO

## 2024-03-04 PROCEDURE — 97140 MANUAL THERAPY 1/> REGIONS: CPT | Mod: GO

## 2024-03-04 NOTE — PROGRESS NOTES
OCCUPATIONAL THERAPY EVALUATION  Type of Visit: Evaluation    See electronic medical record for Abuse and Falls Screening details.    Subjective      Presenting condition or subjective complaint:  worsening BLE lymphedema, L moreso than R  Date of onset:      Relevant medical history:   MS, reports 7 year history of LE swelling with worsening over the past 2 years.   Dates & types of surgery:      Prior diagnostic imaging/testing results:       Prior therapy history for the same diagnosis, illness or injury:    yes, pt reports seeing a lymphedema therapy at [facility]. She was wrapped with GCB once, got knee high stockings and Biotab pump.    Prior Level of Function  Transfers: Assistive equipment  Ambulation: Assistive equipment, Assistive person  ADL: Assistive equipment, Assistive person  IADL:  does not drive    Living Environment  Social support:   family  Type of home:   apartment  Stairs to enter the home:       none  Stairs inside the home:       none  Help at home:  antonio Vidal, present at visit today  Equipment owned:   cane, 4ww    Employment:      Hobbies/Interests:      Patient goals for therapy:  reduce and manage swelling    Pain assessment: Pain denied     Objective       EDEMA EVALUATION  Additional history:  Body part affected by edema:  thighs, legs and feet  If cancer related, treatment:    If not cancer related, problems with veins or cause of swelling:  yes, side effect of MS  Distance able to walk:  not far  Time able to stand:  not long  Sensation problems in hands/feet:    yes, tingles  Edema etiology:  contributing factors include MS, decreased mobility    Cognitive Status Examination  Orientation: Oriented to person, place and time   Level of Consciousness: Alert  Follows Commands and Answers Questions: 100% of the time  Personal Safety and Judgement: Intact  Memory:  not formally tested, not the best historian of lymphedema treatment    EDEMA  Skin Condition: Intact,  Pitting  Scar: No  Ulceration: No    GIRTH MEASUREMENTS: Refer to separate girth measurement flowsheet.     VOLUME LE  Right LE (mL) 7185   Left LE (mL) 8926   LE Volume Comparison LLE volume greater than RLE volume   % Difference 21.6   Head/Neck Volume     Trunk Volume    Genital Volume       RANGE OF MOTION:  LE ROM decreased due to swelling  STRENGTH:  decreased due to fatigue  PALPATION: 2-3 plus pitting edema from MTP joints to groin  ACTIVITIES OF DAILY LIVING: mod I  BED MOBILITY: WFL  TRANSFERS: WFL  GAIT/LOCOMOTION: varies depending on fatigue, was being pushed in her 4ww today  BALANCE:  impaired, has ongoing PT  SENSATION:  decreased sensation in LEs    Assessment & Plan   CLINICAL IMPRESSIONS  Medical Diagnosis:      Treatment Diagnosis:      Impression/Assessment: Pt is a 64 year old female presenting to Occupational Therapy due to BLE lymphedema with progression of symptoms in the LLE.  The following significant findings have been identified: Impaired activity tolerance, Impaired balance, Impaired mobility, Impaired ROM, and Impaired strength.  These identified deficits interfere with their ability to perform self care tasks, household chores, driving , household mobility, and community mobility as compared to previous level of function.     Clinical Decision Making (Complexity):  Assessment of Occupational Performance: 3-5 Performance Deficits  Occupational Performance Limitations: dressing, functional mobility, driving and community mobility, and home establishment and management  Clinical Decision Making (Complexity): Low complexity    PLAN OF CARE  Treatment Interventions:  Interventions: Self-Care/Home Management, Therapeutic Activity, Therapeutic Exercise, Gradient Compression Bandaging, Manual Therapy    Long Term Goals    Volume   Pt will demonstrate a reduction of at least 300 mL in LLE to improve skin integrity, safety with mobility and fit of clothing/shoes.   In order to maximize safety  and independence with performance of self-care activities;   Target date: 5/27/24    Garments   Pt will be fit for and demonstrate independence using new compression garments for long term edema management as evidenced by a no more than 25% increase in volume after transitioning into garments.   In order to maximize safety and independence with performance of self-care activities;   Target date: 5/27/24    Home management   Pt will verbalize understanding of long term management/prevention of lymphedema, including wear schedule for recommended compression garments, manual techniques, skin care, elevation and exercise.   In order to maximize safety and independence with performance of self-care activities;   Target date: 5/27/24      Frequency of Treatment:  3x a week for 2 weeks, 2x a week for 4 weeks, 1x a week for 6 weeks   Duration of Treatment:   12 weeks     Recommended Referrals to Other Professionals: none  Education Assessment:  Patient;Family;Listening;Reading;Demonstration;Pictures/Video;      Risks and benefits of evaluation/treatment have been explained.   Patient/Family/caregiver agrees with Plan of Care.     Evaluation Time: 10      Signing Clinician: Edith Wilocx OT      [facility]                                                                                   OUTPATIENT OCCUPATIONAL THERAPY      PLAN OF TREATMENT FOR OUTPATIENT REHABILITATION   Patient's Last Name, First Name, M.I. Sorrell,Colletta Dwen YOB: 1960   Provider's Name   [facility]   Medical Record No.  [id number]     Onset Date:  2/29/2024 Start of Care Date:  3/4/2024     Medical Diagnosis:   I89.0 (ICD-10-CM) - Lymphedema     OT Treatment Diagnosis:   BLE lymphedema  Plan of Treatment  Frequency/Duration: 3x a week for 2 weeks, 2x a week for 4 weeks, 1x a week for 6 weeks  /12 weeks      Certification date from  3/4/2024   To     5/27/2024     See  note for plan of treatment details and functional goals     Edith Wilcox OT                         I CERTIFY THE NEED FOR THESE SERVICES FURNISHED UNDER        THIS PLAN OF TREATMENT AND WHILE UNDER MY CARE     (Physician attestation of this document indicates review and certification of the therapy plan).              Referring Provider:  Barrera Shannon    Initial Assessment  See Epic Evaluation-  3/4/2024

## 2024-03-08 ENCOUNTER — DOCUMENTATION ONLY (OUTPATIENT)
Dept: NEUROLOGY | Facility: CLINIC | Age: 64
End: 2024-03-08
Payer: MEDICAID

## 2024-03-08 NOTE — PROGRESS NOTES
Plan of Care has been received from [facility], orders placed in Dr. Shannon's folder for review and signature.   Celestine Valencia EMT 03/08/2024 11:59AM

## 2024-03-12 ENCOUNTER — TELEPHONE (OUTPATIENT)
Dept: PHYSICAL MEDICINE AND REHAB | Facility: CLINIC | Age: 64
End: 2024-03-12
Payer: MEDICAID

## 2024-03-12 NOTE — PROGRESS NOTES
Plan of care has been signed and faxed back at [phone].  Celestine Valencia EMT 03/12/2024 9:56AM

## 2024-03-12 NOTE — TELEPHONE ENCOUNTER
Called patient back to advise that typically with a steroid epidural injection the time frame is anywhere from 2 days to 14 days that you would start to see improvement from the steroid starting to help, if it is going to be successful. Now that she is out of that 14 day window Benjy Frank advised that she would follow up again with the referring provider for the injection, Karen Hunter, NP with Neurosurgery, to see if they want to see her again to discuss treatment options or what the next step would be for patient.     No answer, left a detailed message for patient as okayed by her previous message when she called in, relayed this information on the message and provided the Neurosurgery Clinic phone number for her to reach out to them.     YUN McduffieN RN  RN Care Coordinator for Physical Medicine and Rehabilitation  [facility] [address] [address]  Office: [phone]  Fax: [phone]

## 2024-03-12 NOTE — TELEPHONE ENCOUNTER
"[facility] Call Center    Phone Message    May a detailed message be left on voicemail: yes     Reason for Call: Pt called to let us know that her injection hasn't \"kicked in yet\".  She wants to know what is recommended.  Please call her back to discuss.  Thanks.  "

## 2024-03-13 ENCOUNTER — DOCUMENTATION ONLY (OUTPATIENT)
Dept: NEUROLOGY | Facility: CLINIC | Age: 64
End: 2024-03-13
Payer: MEDICAID

## 2024-03-13 NOTE — PROGRESS NOTES
Medical Necessary attendant form has been received from [facility], orders are going to be filled out then placed in Dr. Shannon's folder for review and signature.   Celestine Valencia EMT 03/13/2024 9:11AM

## 2024-03-13 NOTE — TELEPHONE ENCOUNTER
Called patient back, she was calling back because of the missed call but has now listened to the voicemail that was left for her, she has the info to contact neurosurgery again, so no further questions or concerns for this clinic.    YUN McduffieN RN  RN Care Coordinator for Physical Medicine and Rehabilitation  [facility] [address] [address]  Office: [phone]  Fax: [phone]

## 2024-03-15 NOTE — TELEPHONE ENCOUNTER
"Writer called patient and explained that Dr. Frank only performed the injection and would not be able to provide a prescription for oxycodone.  I reiterated that the appropriate next step would be for her to reach back out to the Neurosurgery Department, as Karen Hunter CNP's last message on 1/31/24 noted:    \"Plan:   -Placed referral to Dr. Frank for cervical ALEJO  -Continue physical therapy as able  -Advised patient to call our clinic if symptoms persist 2 weeks after ALEJO\"    Patient verbalized understanding of this, and stated that she would contact Neurosurgery.  She stated that she has the contact information for them and had no further questions at this time.    Marce Lechuga RN on 3/15/2024 at 1:06 PM    "

## 2024-03-15 NOTE — TELEPHONE ENCOUNTER
[facility] Call Center    Phone Message    May a detailed message be left on voicemail: yes     Reason for Call: Other: Pt called stating that the injection she got didn't work but that she doesn't want to do surgery and is stating that Bette Calderón gave her some oxycodone and that seemed to work and is wondering if she can get a prescription for oxycodone. Please call back to advise      Action Taken: Message routed to:  [facility] PMR    Travel Screening: Not Applicable

## 2024-03-19 ENCOUNTER — DOCUMENTATION ONLY (OUTPATIENT)
Dept: NEUROLOGY | Facility: CLINIC | Age: 64
End: 2024-03-19
Payer: MEDICAID

## 2024-03-19 DIAGNOSIS — G35 MULTIPLE SCLEROSIS (H): Primary | ICD-10-CM

## 2024-05-01 ENCOUNTER — TRANSFERRED RECORDS (OUTPATIENT)
Dept: HEALTH INFORMATION MANAGEMENT | Facility: CLINIC | Age: 64
End: 2024-05-01

## 2024-05-03 ENCOUNTER — TELEPHONE (OUTPATIENT)
Dept: FAMILY MEDICINE | Facility: CLINIC | Age: 64
End: 2024-05-03
Payer: COMMERCIAL

## 2024-05-03 NOTE — TELEPHONE ENCOUNTER
Patient Returning Call    Reason for call:  est care, patient has MS and needs a referral but is wondering if there are any providers at the [place] location that she could establish care with and have experience, familiarity or knowledge of MS. Please call back with advice on most appropriate provider to establish with.    Information relayed to patient:  message placed, await call back    Patient has additional questions:  No      Could we send this information to you in [employer] or would you prefer to receive a phone call?:   Patient would prefer a phone call   Okay to leave a detailed message?: Yes at Cell number on file:    Telephone Information:   Mobile [phone]

## 2024-05-06 ENCOUNTER — TELEPHONE (OUTPATIENT)
Dept: PHYSICAL MEDICINE AND REHAB | Facility: CLINIC | Age: 64
End: 2024-05-06
Payer: COMMERCIAL

## 2024-05-06 NOTE — TELEPHONE ENCOUNTER
Per chart review, patient was directed on 3/15/24 to follow up with the [facility]surgery Clinic who referred patient for the injection, as they are managing her care. Patient was going to contact them to schedule a visit with them at that time. No follow up has been scheduled yet.     Called patient back to relay this message again to patient. Provided the Neurosurgery phone # [phone] for her to call and get scheduled for the follow up visit with them. Patient write the number down and will call to get a follow up appt scheduled with them.    YUN McduffieN RN  RN Care Coordinator for Physical Medicine and Rehabilitation  [facility] [address] [address]  Office: [phone]  Fax: [phone]

## 2024-05-06 NOTE — TELEPHONE ENCOUNTER
[facility] Call Center    Phone Message    May a detailed message be left on voicemail: yes     Reason for Call: Other: Patient calling stating she had an injection 2/26/2024 and it did not work.  She is wanting to see if she can have some pain meds to help her.  Also, what are her next steps.  Please call back to advise.      Action Taken: Message routed to:  [facility] PMR    Travel Screening: Not Applicable

## 2024-05-07 ENCOUNTER — TELEPHONE (OUTPATIENT)
Dept: NEUROLOGY | Facility: CLINIC | Age: 64
End: 2024-05-07

## 2024-05-07 DIAGNOSIS — G35 MULTIPLE SCLEROSIS (H): Primary | ICD-10-CM

## 2024-05-07 RX ORDER — ACETAMINOPHEN 325 MG/1
650 TABLET ORAL ONCE
Status: CANCELLED | OUTPATIENT
Start: 2024-06-24

## 2024-05-07 RX ORDER — ALBUTEROL SULFATE 0.83 MG/ML
2.5 SOLUTION RESPIRATORY (INHALATION)
Status: CANCELLED | OUTPATIENT
Start: 2024-06-24

## 2024-05-07 RX ORDER — ALBUTEROL SULFATE 90 UG/1
1-2 AEROSOL, METERED RESPIRATORY (INHALATION)
Status: CANCELLED
Start: 2024-06-24

## 2024-05-07 RX ORDER — DIPHENHYDRAMINE HYDROCHLORIDE 50 MG/ML
50 INJECTION INTRAMUSCULAR; INTRAVENOUS
Status: CANCELLED
Start: 2024-06-24

## 2024-05-07 RX ORDER — METHYLPREDNISOLONE SODIUM SUCCINATE 125 MG/2ML
125 INJECTION, POWDER, LYOPHILIZED, FOR SOLUTION INTRAMUSCULAR; INTRAVENOUS
Status: CANCELLED
Start: 2024-06-24

## 2024-05-07 RX ORDER — EPINEPHRINE 1 MG/ML
0.3 INJECTION, SOLUTION, CONCENTRATE INTRAVENOUS EVERY 5 MIN PRN
Status: CANCELLED | OUTPATIENT
Start: 2024-06-24

## 2024-05-07 RX ORDER — HEPARIN SODIUM,PORCINE 10 UNIT/ML
5-20 VIAL (ML) INTRAVENOUS DAILY PRN
Status: CANCELLED | OUTPATIENT
Start: 2024-06-24

## 2024-05-07 RX ORDER — MEPERIDINE HYDROCHLORIDE 25 MG/ML
25 INJECTION INTRAMUSCULAR; INTRAVENOUS; SUBCUTANEOUS EVERY 30 MIN PRN
Status: CANCELLED | OUTPATIENT
Start: 2024-06-24

## 2024-05-07 RX ORDER — METHYLPREDNISOLONE SODIUM SUCCINATE 125 MG/2ML
125 INJECTION, POWDER, LYOPHILIZED, FOR SOLUTION INTRAMUSCULAR; INTRAVENOUS ONCE
Status: CANCELLED | OUTPATIENT
Start: 2024-06-24

## 2024-05-07 RX ORDER — HEPARIN SODIUM (PORCINE) LOCK FLUSH IV SOLN 100 UNIT/ML 100 UNIT/ML
5 SOLUTION INTRAVENOUS
Status: CANCELLED | OUTPATIENT
Start: 2024-06-24

## 2024-05-07 RX ORDER — DIPHENHYDRAMINE HCL 25 MG
50 CAPSULE ORAL ONCE
Status: CANCELLED | OUTPATIENT
Start: 2024-06-24

## 2024-05-07 NOTE — TELEPHONE ENCOUNTER
Colletta infuses Ocrevus 600 mg every 11 months. Target date for next infusion is June 24, 2024. Infuses at [facility]. Orders pended to Dr Shannon for signature.    Alma Cramer RN

## 2024-05-30 ENCOUNTER — OFFICE VISIT (OUTPATIENT)
Dept: FAMILY MEDICINE | Facility: CLINIC | Age: 64
End: 2024-05-30
Payer: COMMERCIAL

## 2024-05-30 VITALS
RESPIRATION RATE: 16 BRPM | OXYGEN SATURATION: 100 % | HEART RATE: 57 BPM | WEIGHT: 163.2 LBS | SYSTOLIC BLOOD PRESSURE: 139 MMHG | HEIGHT: 65 IN | DIASTOLIC BLOOD PRESSURE: 85 MMHG | BODY MASS INDEX: 27.19 KG/M2 | TEMPERATURE: 97.4 F

## 2024-05-30 DIAGNOSIS — G35 MULTIPLE SCLEROSIS (H): ICD-10-CM

## 2024-05-30 DIAGNOSIS — I87.1 ILIAC VEIN STENOSIS, LEFT: ICD-10-CM

## 2024-05-30 DIAGNOSIS — I87.1 MAY-THURNER SYNDROME: Primary | ICD-10-CM

## 2024-05-30 DIAGNOSIS — I89.0 LYMPHEDEMA OF BOTH LOWER EXTREMITIES: ICD-10-CM

## 2024-05-30 DIAGNOSIS — M54.12 CERVICAL RADICULAR PAIN: ICD-10-CM

## 2024-05-30 PROCEDURE — 99203 OFFICE O/P NEW LOW 30 MIN: CPT | Performed by: INTERNAL MEDICINE

## 2024-05-30 SDOH — HEALTH STABILITY: PHYSICAL HEALTH: ON AVERAGE, HOW MANY DAYS PER WEEK DO YOU ENGAGE IN MODERATE TO STRENUOUS EXERCISE (LIKE A BRISK WALK)?: 0 DAYS

## 2024-05-30 SDOH — HEALTH STABILITY: PHYSICAL HEALTH: ON AVERAGE, HOW MANY MINUTES DO YOU ENGAGE IN EXERCISE AT THIS LEVEL?: 0 MIN

## 2024-05-30 ASSESSMENT — SOCIAL DETERMINANTS OF HEALTH (SDOH): HOW OFTEN DO YOU GET TOGETHER WITH FRIENDS OR RELATIVES?: TWICE A WEEK

## 2024-05-30 NOTE — PROGRESS NOTES
"  30 minutes spent by me on the date of the encounter doing chart review, history and exam, documentation and further activities per the note  Assessment & Plan     May-Thurner syndrome  She was evaluated by Vein clinics recently and was found to have iliac vein stenosis on the left side  They diagnosed her with May-Thurner syndrome  She was advised to get vascular surgery evaluation for stenting  She is here today seeking referral  - Vascular Surgery Referral; Future    Iliac vein stenosis, left  As above  - Vascular Surgery Referral; Future    Lymphedema of both lower extremities  She has chronic lymphedema of both lower extremities she was following with vein clinics and they were doing lymphedema pumps    Multiple sclerosis (H)  She follows with neurology and is on Ocrevus infusions yearly  She is also on amantadine 2 times a day but she is not clear if she is taking this    Cervical radicular pain  She has multilevel cervical spondylosis  She has received a C7-T1 epidural steroid injection February but she tells me this has not improved her symptoms  She wants to get a second opinion  - Pain Management  Referral; Future    Patient has been advised of split billing requirements and indicates understanding: No        BMI  Estimated body mass index is 27.16 kg/m  as calculated from the following:    Height as of this encounter: 1.651 m (5' 5\").    Weight as of this encounter: 74 kg (163 lb 3.2 oz).       Counseling  Appropriate preventive services were discussed with this patient, including applicable screening as appropriate for fall prevention, nutrition, physical activity, Tobacco-use cessation, weight loss and cognition.  Checklist reviewing preventive services available has been given to the patient.  Reviewed patient's diet, addressing concerns and/or questions.   She is at risk for psychosocial distress and has been provided with information to reduce risk.           Subjective   Colletta is a 64 " "year old, presenting for the following health issues:  Physical and established care      5/30/2024     7:32 AM   Additional Questions   Roomed by Annia   Accompanied by daughter         5/30/2024     7:32 AM   Patient Reported Additional Medications   Patient reports taking the following new medications no     HPI               Review of Systems  Constitutional, HEENT, cardiovascular, pulmonary, gi and gu systems are negative, except as otherwise noted.      Objective    /85 (BP Location: Left arm, Patient Position: Sitting, Cuff Size: Adult Regular)   Pulse 57   Temp 97.4  F (36.3  C) (Oral)   Resp 16   Ht 1.651 m (5' 5\")   Wt 74 kg (163 lb 3.2 oz)   SpO2 100%   BMI 27.16 kg/m    Body mass index is 27.16 kg/m .  Physical Exam   GENERAL: alert and no distress  EYES: Eyes grossly normal to inspection, PERRL and conjunctivae and sclerae normal  NECK: no adenopathy, no asymmetry, masses, or scars  SKIN: Bilateral swollen lower extremities consistent with lymphedema  Nonpitting edema  PSYCH: mentation appears normal, affect normal/bright            Signed Electronically by: Lionel Owen MD    "

## 2024-05-30 NOTE — PROGRESS NOTES
Preventive Care Visit  [facility] ASHLEE Owen MD, Internal Medicine  May 30, 2024  {Provider  Link to SmartSet :165126}    {PROVIDER CHARTING PREFERENCE:339644}    Subjective Colletta is a 64 year old, presenting for the following:  Physical and established care        5/30/2024     7:32 AM   Additional Questions   Roomed by Milner   Accompanied by daughter         5/30/2024     7:32 AM   Patient Reported Additional Medications   Patient reports taking the following new medications no   {ROOMER positive Fall Risk- Gait Speed Test is required click here to document the Gait Speed Test and then refresh the note to pull in results  :485177}    Health Care Directive  Patient does not have a Health Care Directive or Living Will: {ADVANCE_DIRECTIVE_STATUS:519965}    HPI  ***  {MA/LPN/RN Pre-Provider Visit Orders- hCG/UA/Strep (Optional):096638}  {SUPERLIST (Optional):833038}  {additonal problems for provider to add (Optional):099301}      5/30/2024   General Health   How would you rate your overall physical health? (!) FAIR   Feel stress (tense, anxious, or unable to sleep) Only a little   (!) STRESS CONCERN      5/30/2024   Nutrition   Three or more servings of calcium each day? Yes   Diet: Regular (no restrictions)   How many servings of fruit and vegetables per day? (!) 2-3   How many sweetened beverages each day? 0-1         5/30/2024   Exercise   Days per week of moderate/strenous exercise 0 days   Average minutes spent exercising at this level 0 min   (!) EXERCISE CONCERN      5/30/2024   Social Factors   Frequency of gathering with friends or relatives Twice a week   Worry food won't last until get money to buy more No   Food not last or not have enough money for food? No   Do you have housing?  Yes   Are you worried about losing your housing? No   Lack of transportation? No   Unable to get utilities (heat,electricity)? No         5/30/2024   Fall Risk   Fallen 2 or more times in  the past year? No   Trouble with walking or balance? Yes     {Positive Fall Risk- Gait Speed Test is required and was not documented before note was started.  If results do not appear, ask staff to complete.  Once completed, refresh note to pull in results Click here to link Gait Speed Test  :547461}      2024   Dental   Dentist two times every year? Yes         2024   TB Screening   Were you born outside of the [place]? No         Today's PHQ-2 Score:       2024     7:41 AM   PHQ-2 (  [employer])   Q1: Little interest or pleasure in doing things 0   Q2: Feeling down, depressed or hopeless 0   PHQ-2 Score 0   Q1: Little interest or pleasure in doing things Not at all   Q2: Feeling down, depressed or hopeless Not at all   PHQ-2 Score 0           2024   Substance Use   Alcohol more than 3/day or more than 7/wk No   Do you use any other substances recreationally? No     Social History     Tobacco Use    Smoking status: Former     Current packs/day: 0.00     Types: Cigarettes     Quit date: 2017     Years since quittin.3    Smokeless tobacco: Never   Substance Use Topics    Alcohol use: Yes     Alcohol/week: 1.0 standard drink of alcohol     Types: 1 Glasses of wine per week     Comment: EVERY 2 WEEKS    Drug use: No     {Provider  If there are gaps in the social history shown above, please follow the link to update and then refresh the note Link to Social and Substance History :169807}     {Mammogram Decision Support (Optional):697352}        2024   STI Screening   New sexual partner(s) since last STI/HIV test? No     History of abnormal Pap smear: { :264896}       ASCVD Risk   The 10-year ASCVD risk score (Kelsey HAYES, et al., 2019) is: 5.3%    Values used to calculate the score:      Age: 64 years      Sex: Female      Is Non- : No      Diabetic: No      Tobacco smoker: No      Systolic Blood Pressure: 139 mmHg      Is BP treated: No      HDL Cholesterol: 73  "mg/dL      Total Cholesterol: 210 mg/dL    {Link to Fracture Risk Assessment Tool (Optional):853279}    {Provider  Use the storyboard to review patient history, after sections have been marked as reviewed, refresh note to capture documentation:805419}   Reviewed and updated as needed this visit by Provider                    {HISTORY OPTIONS (Optional):468862}    {ROS Picklists (Optional):144627}     Objective    Exam  /85   Pulse 57   Temp 97.4  F (36.3  C) (Oral)   Resp 16   Ht 1.651 m (5' 5\")   Wt 74 kg (163 lb 3.2 oz)   SpO2 100%   BMI 27.16 kg/m     Estimated body mass index is 27.16 kg/m  as calculated from the following:    Height as of this encounter: 1.651 m (5' 5\").    Weight as of this encounter: 74 kg (163 lb 3.2 oz).    Physical Exam  {Exam Choices (Optional):065308}        Signed Electronically by: Lionel Owen MD  {Email feedback regarding this note to [email or web address]   :367188}  "

## 2024-06-04 ENCOUNTER — TELEPHONE (OUTPATIENT)
Dept: VASCULAR SURGERY | Facility: CLINIC | Age: 64
End: 2024-06-04
Payer: COMMERCIAL

## 2024-06-04 NOTE — TELEPHONE ENCOUNTER
Called pt to do a previsit call.   Pt states that he had an injection and wasn't successful.     He states that he trusted the doctor who did the injection however wanted to see another doctor .    Pt states that she has multiple sclerosis.    She did see a lymphedema specialist and they had a conversation about swelling.     She did have her both legs wrapped and swelling did decrease. The third time her swelling did not go down.     She states that her left >right.   Swelling has been ongoing x 5 years but intermittent swelling.    Recently ongoing swelling x 4 months now.    She did go to a vein clinic in [place]-she tells me that its called   [facility]  [facility]  Ph. [phone]  Fax: [phone]    -they did complete an US on both legs.     I do see the medical records from when she attended the Vein clinic notes in her chart.     Denies numbness/tingling/pain    Compression stockings use for 6 months    Informed her that I will send a msg to my doctor for review anad then call her.     Would recommend that we see her sooner rather than later.     She agrees to plan.     Jazmyn VANCE RN, BSN  Interventional Radiology/Vascular  Nurse Coordinator   Phone: [phone]

## 2024-06-06 ENCOUNTER — TELEPHONE (OUTPATIENT)
Dept: FAMILY MEDICINE | Facility: CLINIC | Age: 64
End: 2024-06-06
Payer: COMMERCIAL

## 2024-06-06 NOTE — TELEPHONE ENCOUNTER
Reason for Call:  Other FYI    Detailed comments: Patient calling to let Dr Owen know that the US Results were faxed From [facility] to the [facility] at [phone]. Patient stated that Dr Owen asked for these results and just wanted to let him know that they were faxed to him. She said that these results were needed to schedule for her procedure.     Phone Number Patient can be reached at: Cell number on file:    Telephone Information:   Mobile [phone]       Best Time: Any, No need for call back    Can we leave a detailed message on this number? YES    Call taken on 6/6/2024 at 2:52 PM by Salima Ramos

## 2024-06-07 NOTE — TELEPHONE ENCOUNTER
Looked in the chart under Media and it looks like this was abstracted yesterday. Routing back to Dr Owen.  Charmaine Morrison [facility]   Primary Care

## 2024-06-14 DIAGNOSIS — M79.89 LEFT LEG SWELLING: ICD-10-CM

## 2024-06-14 DIAGNOSIS — I87.1 MAY-THURNER SYNDROME: Primary | ICD-10-CM

## 2024-06-17 ENCOUNTER — TELEPHONE (OUTPATIENT)
Dept: VASCULAR SURGERY | Facility: CLINIC | Age: 64
End: 2024-06-17
Payer: COMMERCIAL

## 2024-06-17 ENCOUNTER — TELEPHONE (OUTPATIENT)
Dept: FAMILY MEDICINE | Facility: CLINIC | Age: 64
End: 2024-06-17
Payer: COMMERCIAL

## 2024-06-17 NOTE — TELEPHONE ENCOUNTER
Colletta is looking for the phone numbers for vascular surgery and the pain clinic to get the appointments scheduled as they have not called her.    Nursing support:  Colletta was given the numbers below to call and schedule those appointments.  Patient verbalized good understanding, agrees with plan and needs no further support.  Thank you. Cadence Lema R.N.

## 2024-06-17 NOTE — TELEPHONE ENCOUNTER
Called pt to fup on last conversation re: scheduling an appt for her to come in and see provider.     Explained that we have tried to retrieve images from [facility], however myself along with our medical records person have been unsuccessful.     I explained to her that since we cannot get images then we will plan for her to come in for the US and then to see Dr. Rivera on the same day.     We do have the medical records paper and explained that we will need the actual images which sometimes other facilities will need to send via by mail.     Explained that we will plan to have her repeat an US at our location and then plan for an appt to follow after.     We did loop in our imaging scheduler     US comp study scheduled for   6/24 at 630am     Will plan for consult on 7/11 with Dr Rivera at 10am in person    All appt details provided for pt.     She will call with any other questions.     Jazmyn VANCE RN, BSN  Interventional Radiology/Vascular  Nurse Coordinator   Phone: [phone]

## 2024-06-24 ENCOUNTER — TELEPHONE (OUTPATIENT)
Dept: VASCULAR SURGERY | Facility: CLINIC | Age: 64
End: 2024-06-24

## 2024-06-24 ENCOUNTER — DOCUMENTATION ONLY (OUTPATIENT)
Dept: VASCULAR SURGERY | Facility: CLINIC | Age: 64
End: 2024-06-24

## 2024-06-24 ENCOUNTER — ANCILLARY PROCEDURE (OUTPATIENT)
Dept: ULTRASOUND IMAGING | Facility: CLINIC | Age: 64
End: 2024-06-24
Attending: STUDENT IN AN ORGANIZED HEALTH CARE EDUCATION/TRAINING PROGRAM
Payer: COMMERCIAL

## 2024-06-24 ENCOUNTER — INFUSION THERAPY VISIT (OUTPATIENT)
Dept: INFUSION THERAPY | Facility: CLINIC | Age: 64
End: 2024-06-24
Attending: PSYCHIATRY & NEUROLOGY
Payer: COMMERCIAL

## 2024-06-24 VITALS
WEIGHT: 161.4 LBS | RESPIRATION RATE: 16 BRPM | TEMPERATURE: 97.9 F | SYSTOLIC BLOOD PRESSURE: 122 MMHG | OXYGEN SATURATION: 99 % | HEART RATE: 55 BPM | DIASTOLIC BLOOD PRESSURE: 79 MMHG | BODY MASS INDEX: 26.86 KG/M2

## 2024-06-24 DIAGNOSIS — I87.1 MAY-THURNER SYNDROME: ICD-10-CM

## 2024-06-24 DIAGNOSIS — M79.89 LEFT LEG SWELLING: ICD-10-CM

## 2024-06-24 DIAGNOSIS — G35 MULTIPLE SCLEROSIS (H): ICD-10-CM

## 2024-06-24 DIAGNOSIS — I87.1 MAY-THURNER SYNDROME: Primary | ICD-10-CM

## 2024-06-24 PROCEDURE — 93971 EXTREMITY STUDY: CPT | Mod: LT | Performed by: RADIOLOGY

## 2024-06-24 PROCEDURE — 99207 PR NO CHARGE LOS: CPT

## 2024-06-24 PROCEDURE — 93978 VASCULAR STUDY: CPT | Mod: GC | Performed by: RADIOLOGY

## 2024-06-24 NOTE — PROGRESS NOTES
Infusion Nursing Note:  Chelilissetbasilia Formanmary Ward presents today for Ocrevus-NOT given.    Patient seen by provider today: No   present during visit today: Not Applicable.    Note: Patient expressed she is not hydrated today. Reports not drinking fluids yesterday due to a graduation party or this morning due to early appts. Infusion staff unable to get PIV access on patient, multiple attempts (using emla cream per patient request). Patient asked to rescheduled appt. Patient in understanding she needs to hydrated leading up to the next infusion appt.     Discharge Plan:   Discharge instructions reviewed with: Patient.  Patient and/or family verbalized understanding of discharge instructions and all questions answered.  Patient discharged in stable condition accompanied by: daughter.  Departure Mode: Ambulatory.  Patient stopping at scheduling desk to reschedule Ocrevus appt.    Karlene Saavedra RN

## 2024-06-24 NOTE — PROGRESS NOTES
Received msg from [facility] noting that pt was present for US appt.     After conversation and review of medical records, US comp bilateral study was ordered, however pt has hx of possible may thurner.     Will update order to note left lower extremity venous comp study and then left iliac ultrasound to further assess iliac vein compression due to pt's current history of left leg swelling.    Also supporting external documents from previous provider notes possible may thurner syndrome     Jazmyn VANCE RN, BSN  Interventional Radiology/Vascular  Nurse Coordinator   Phone: [phone]

## 2024-06-26 NOTE — TELEPHONE ENCOUNTER
Pt did not infuse Ocrevus on 6/24. Per chart review, unable to obtain IV access. Pt now scheduled for Ocrevus on 8/5.    Alma Cramer RN

## 2024-06-27 ENCOUNTER — APPOINTMENT (OUTPATIENT)
Dept: CT IMAGING | Facility: CLINIC | Age: 64
End: 2024-06-27
Attending: PHYSICIAN ASSISTANT
Payer: COMMERCIAL

## 2024-06-27 ENCOUNTER — HOSPITAL ENCOUNTER (EMERGENCY)
Facility: CLINIC | Age: 64
Discharge: HOME | End: 2024-06-27
Attending: STUDENT IN AN ORGANIZED HEALTH CARE EDUCATION/TRAINING PROGRAM | Admitting: STUDENT IN AN ORGANIZED HEALTH CARE EDUCATION/TRAINING PROGRAM
Payer: COMMERCIAL

## 2024-06-27 ENCOUNTER — TELEPHONE (OUTPATIENT)
Dept: FAMILY MEDICINE | Facility: CLINIC | Age: 64
End: 2024-06-27
Payer: COMMERCIAL

## 2024-06-27 VITALS
OXYGEN SATURATION: 100 % | HEART RATE: 58 BPM | DIASTOLIC BLOOD PRESSURE: 68 MMHG | TEMPERATURE: 98.3 F | RESPIRATION RATE: 16 BRPM | SYSTOLIC BLOOD PRESSURE: 122 MMHG

## 2024-06-27 DIAGNOSIS — I73.9 PERIPHERAL VASCULAR DISEASE (H): ICD-10-CM

## 2024-06-27 DIAGNOSIS — I82.412 DEEP VEIN THROMBOSIS (DVT) OF FEMORAL VEIN OF LEFT LOWER EXTREMITY, UNSPECIFIED CHRONICITY (H): ICD-10-CM

## 2024-06-27 LAB
ALBUMIN SERPL BCG-MCNC: 4.6 G/DL (ref 3.5–5.2)
ALP SERPL-CCNC: 74 U/L (ref 40–150)
ALT SERPL W P-5'-P-CCNC: 22 U/L (ref 0–50)
ANION GAP SERPL CALCULATED.3IONS-SCNC: 10 MMOL/L (ref 7–15)
AST SERPL W P-5'-P-CCNC: 27 U/L (ref 0–45)
BASOPHILS # BLD AUTO: ABNORMAL 10*3/UL
BASOPHILS # BLD MANUAL: 0 10E3/UL (ref 0–0.2)
BASOPHILS NFR BLD AUTO: ABNORMAL %
BASOPHILS NFR BLD MANUAL: 1 %
BILIRUB SERPL-MCNC: 0.4 MG/DL
BUN SERPL-MCNC: 9.8 MG/DL (ref 8–23)
CALCIUM SERPL-MCNC: 9.5 MG/DL (ref 8.8–10.2)
CHLORIDE SERPL-SCNC: 103 MMOL/L (ref 98–107)
CREAT SERPL-MCNC: 0.66 MG/DL (ref 0.51–0.95)
DEPRECATED HCO3 PLAS-SCNC: 26 MMOL/L (ref 22–29)
EGFRCR SERPLBLD CKD-EPI 2021: >90 ML/MIN/1.73M2
EOSINOPHIL # BLD AUTO: ABNORMAL 10*3/UL
EOSINOPHIL # BLD MANUAL: 0 10E3/UL (ref 0–0.7)
EOSINOPHIL NFR BLD AUTO: ABNORMAL %
EOSINOPHIL NFR BLD MANUAL: 1 %
ERYTHROCYTE [DISTWIDTH] IN BLOOD BY AUTOMATED COUNT: 15.2 % (ref 10–15)
GLUCOSE SERPL-MCNC: 85 MG/DL (ref 70–99)
HCT VFR BLD AUTO: 40.3 % (ref 35–47)
HGB BLD-MCNC: 13.2 G/DL (ref 11.7–15.7)
IMM GRANULOCYTES # BLD: ABNORMAL 10*3/UL
IMM GRANULOCYTES NFR BLD: ABNORMAL %
LYMPHOCYTES # BLD AUTO: ABNORMAL 10*3/UL
LYMPHOCYTES # BLD MANUAL: 2.3 10E3/UL (ref 0.8–5.3)
LYMPHOCYTES NFR BLD AUTO: ABNORMAL %
LYMPHOCYTES NFR BLD MANUAL: 47 %
MCH RBC QN AUTO: 27.6 PG (ref 26.5–33)
MCHC RBC AUTO-ENTMCNC: 32.8 G/DL (ref 31.5–36.5)
MCV RBC AUTO: 84 FL (ref 78–100)
MONOCYTES # BLD AUTO: ABNORMAL 10*3/UL
MONOCYTES # BLD MANUAL: 0.3 10E3/UL (ref 0–1.3)
MONOCYTES NFR BLD AUTO: ABNORMAL %
MONOCYTES NFR BLD MANUAL: 6 %
NEUTROPHILS # BLD AUTO: ABNORMAL 10*3/UL
NEUTROPHILS # BLD MANUAL: 2.2 10E3/UL (ref 1.6–8.3)
NEUTROPHILS NFR BLD AUTO: ABNORMAL %
NEUTROPHILS NFR BLD MANUAL: 45 %
NRBC # BLD AUTO: 0 10E3/UL
NRBC BLD AUTO-RTO: 0 /100
PLAT MORPH BLD: NORMAL
PLATELET # BLD AUTO: 268 10E3/UL (ref 150–450)
POTASSIUM SERPL-SCNC: 4 MMOL/L (ref 3.4–5.3)
PROT SERPL-MCNC: 7.6 G/DL (ref 6.4–8.3)
RBC # BLD AUTO: 4.78 10E6/UL (ref 3.8–5.2)
RBC MORPH BLD: NORMAL
SODIUM SERPL-SCNC: 139 MMOL/L (ref 135–145)
WBC # BLD AUTO: 4.9 10E3/UL (ref 4–11)

## 2024-06-27 PROCEDURE — 250N000011 HC RX IP 250 OP 636: Performed by: STUDENT IN AN ORGANIZED HEALTH CARE EDUCATION/TRAINING PROGRAM

## 2024-06-27 PROCEDURE — 250N000009 HC RX 250: Performed by: STUDENT IN AN ORGANIZED HEALTH CARE EDUCATION/TRAINING PROGRAM

## 2024-06-27 PROCEDURE — 36415 COLL VENOUS BLD VENIPUNCTURE: CPT | Performed by: PHYSICIAN ASSISTANT

## 2024-06-27 PROCEDURE — 85007 BL SMEAR W/DIFF WBC COUNT: CPT | Performed by: PHYSICIAN ASSISTANT

## 2024-06-27 PROCEDURE — 73701 CT LOWER EXTREMITY W/DYE: CPT | Mod: LT

## 2024-06-27 PROCEDURE — 85027 COMPLETE CBC AUTOMATED: CPT | Performed by: PHYSICIAN ASSISTANT

## 2024-06-27 PROCEDURE — 80053 COMPREHEN METABOLIC PANEL: CPT | Performed by: PHYSICIAN ASSISTANT

## 2024-06-27 PROCEDURE — 250N000013 HC RX MED GY IP 250 OP 250 PS 637: Performed by: PHYSICIAN ASSISTANT

## 2024-06-27 PROCEDURE — 73701 CT LOWER EXTREMITY W/DYE: CPT | Mod: LT,XS

## 2024-06-27 PROCEDURE — 99285 EMERGENCY DEPT VISIT HI MDM: CPT | Mod: 25 | Performed by: STUDENT IN AN ORGANIZED HEALTH CARE EDUCATION/TRAINING PROGRAM

## 2024-06-27 PROCEDURE — 99284 EMERGENCY DEPT VISIT MOD MDM: CPT | Performed by: STUDENT IN AN ORGANIZED HEALTH CARE EDUCATION/TRAINING PROGRAM

## 2024-06-27 RX ORDER — OXYCODONE HYDROCHLORIDE 5 MG/1
5 TABLET ORAL ONCE
Status: COMPLETED | OUTPATIENT
Start: 2024-06-27 | End: 2024-06-27

## 2024-06-27 RX ORDER — IOPAMIDOL 755 MG/ML
100 INJECTION, SOLUTION INTRAVASCULAR ONCE
Status: COMPLETED | OUTPATIENT
Start: 2024-06-27 | End: 2024-06-27

## 2024-06-27 RX ADMIN — APIXABAN 10 MG: 5 TABLET, FILM COATED ORAL at 21:46

## 2024-06-27 RX ADMIN — IOPAMIDOL 90 ML: 755 INJECTION, SOLUTION INTRAVENOUS at 19:50

## 2024-06-27 RX ADMIN — SODIUM CHLORIDE 60 ML: 9 INJECTION, SOLUTION INTRAVENOUS at 19:50

## 2024-06-27 RX ADMIN — OXYCODONE HYDROCHLORIDE 5 MG: 5 TABLET ORAL at 21:09

## 2024-06-27 ASSESSMENT — ACTIVITIES OF DAILY LIVING (ADL)
ADLS_ACUITY_SCORE: 33
ADLS_ACUITY_SCORE: 35
ADLS_ACUITY_SCORE: 33

## 2024-06-27 ASSESSMENT — COLUMBIA-SUICIDE SEVERITY RATING SCALE - C-SSRS
1. IN THE PAST MONTH, HAVE YOU WISHED YOU WERE DEAD OR WISHED YOU COULD GO TO SLEEP AND NOT WAKE UP?: NO
2. HAVE YOU ACTUALLY HAD ANY THOUGHTS OF KILLING YOURSELF IN THE PAST MONTH?: NO
6. HAVE YOU EVER DONE ANYTHING, STARTED TO DO ANYTHING, OR PREPARED TO DO ANYTHING TO END YOUR LIFE?: NO

## 2024-06-27 NOTE — TELEPHONE ENCOUNTER
Assist with scheduling in slot with Sabrina Starkey at 1130 AM today in person for DVT - I held the spot - patient needs to be informed

## 2024-06-27 NOTE — ED TRIAGE NOTES
Triage Assessment (Adult)       Row Name 06/27/24 1441          Triage Assessment    Airway WDL WDL        Respiratory WDL    Respiratory WDL WDL        Skin Circulation/Temperature WDL    Skin Circulation/Temperature WDL WDL        Cardiac WDL    Cardiac WDL WDL        Peripheral/Neurovascular WDL    Peripheral Neurovascular WDL WDL        Cognitive/Neuro/Behavioral WDL    Cognitive/Neuro/Behavioral WDL WDL

## 2024-06-27 NOTE — ED PROVIDER NOTES
[facility] EMERGENCY DEPARTMENT ([facility])    6/27/24      ED PROVIDER NOTE   ED 20  History     Chief Complaint   Patient presents with    Deep Vein Thrombosis     In left leg according to PCP     The history is provided by the patient and medical records.     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 Pt reports chronic history of lymphedema.  This was managed with compressive wraps, until 3 months ago when wraps did not seem to improve swelling.  She reports going to an outside vascular surgeon at the end of May 2024.  Scanned H&P in [facility], shows the patient was diagnosed with left iliac vein stenosis and May Thurner syndrome, it was recommended she follow-up for evaluation for potential angioplasty and stent placement.  Patient states that she then came to [facility] for further care, and had an outpatient duplex here as a part of her ongoing workup which showed extensive nonocclusive DVT versus chronic venous changes of multiple components of the left femoral vein, popliteal veins, and peroneal veins.  Patient denies pain or paresthesias of the extremity.  No VTE history, recent surgery, exogenous hormone use, CP, SOB.    Past Medical History  Past Medical History:   Diagnosis Date    Cubital tunnel syndrome     Meralgia paraesthetica     MS (multiple sclerosis) (H)     Multinodular thyroid     Neuropathy      Past Surgical History:   Procedure Laterality Date    INJECT EPIDURAL CERVICAL Left 2/26/2024    Procedure: C7-T1 interlaminar epidural steroid injection;  Surgeon: Dorothy Frank MD;  [place] [facility] [place]     apixaban ANTICOAGULANT (ELIQUIS) 5 MG tablet  Alpha-Lipoic Acid 300 MG CAPS  amantadine (SYMMETREL) 100 MG capsule  Ascorbic Acid (VITAMIN C PO)  B Complex Vitamins (VITAMIN B COMPLEX PO)  benzonatate (TESSALON) 100 MG capsule  CALCIUM-VITAMIN D PO  cholecalciferol (VITAMIN D3) 5000 units (125 mcg) capsule  Cyanocobalamin (VITAMIN B 12  PO)  Green Tea, Radha sinensis, (GREEN TEA PO)  HEMP OIL OR EXTRACT OR OTHER CBD CANNABINOID, NOT MEDICAL CANNABIS,  Incontinence Supply Disposable (DEPEND FIT-FLEX-WOMEN-M) MISC  Lactobacillus (ACIDOPHILUS) CAPS  MAGNESIUM OXIDE PO  melatonin 3 MG tablet  Multiple Vitamins-Minerals (MULTIVITAMIN ADULT) TABS  Naltrexone POWD  Ocrelizumab (OCREVUS IV)  Omega-3 Fatty Acids (FISH OIL) 1200 MG capsule  oxyBUTYnin ER (DITROPAN XL) 15 MG 24 hr tablet  Probiotic Product (ACIDOPHILUS PROBIOTIC BLEND) CAPS  TURMERIC PO  Vitamin A 45618 units CAPS      Allergies   Allergen Reactions    Compazine [Prochlorperazine] Swelling     Swelling of tongue about 25 years ago     Family History  Family History   Problem Relation Age of Onset    Multiple Sclerosis Father      Social History   Social History     Tobacco Use    Smoking status: Former     Current packs/day: 0.00     Types: Cigarettes     Quit date: 2017     Years since quittin.4    Smokeless tobacco: Never   Substance Use Topics    Alcohol use: Yes     Alcohol/week: 1.0 standard drink of alcohol     Types: 1 Glasses of wine per week     Comment: EVERY 2 WEEKS    Drug use: No      A medically appropriate review of systems was performed with pertinent positives and negatives noted in the HPI, and all other systems negative.    Physical Exam   BP: 122/68  Pulse: 58  Temp: 98.3  F (36.8  C)  Resp: 16  SpO2: 100 %  Physical Exam  Constitutional:       General: She is not in acute distress.     Appearance: Normal appearance. She is not diaphoretic.   HENT:      Head: Atraumatic.      Mouth/Throat:      Mouth: Mucous membranes are moist.   Eyes:      Conjunctiva/sclera: Conjunctivae normal.   Cardiovascular:      Rate and Rhythm: Normal rate.   Pulmonary:      Effort: Pulmonary effort is normal.   Abdominal:      General: Abdomen is flat.   Musculoskeletal:      Cervical back: Neck supple.      Comments: LLE without overlying skin changes  No TTP  Distal pulses and  sensation intact   Compartments soft   Skin:     General: Skin is warm.   Neurological:      Mental Status: She is alert.           ED Course, Procedures, & Data      Procedures                Results for orders placed or performed during the hospital encounter of 06/27/24   CT Femur Thigh Left with Contrast     Status: None    Narrative    EXAM: CT TIBIA FIBULA LOWER LEG LEFT W CONTRAST, CT FEMUR THIGH LEFT WITH CONTRAST  LOCATION: [facility]  DATE: 6/27/2024    INDICATION: Left lower extremity swelling. DVT.  COMPARISON: Ultrasound in 6/24/2024  TECHNIQUE: IV contrast. Axial, sagittal and coronal thin-section reconstruction. Dose reduction techniques were used.   CONTRAST: 90mL Isovue 370    FINDINGS:   Diffuse subcutaneous soft tissue stranding involving the left lower leg most marked distally, and extending into the distal thigh. No fluid collection. No significant deeper soft tissue abnormality. There is atrophy of the distal medial gastrocnemius   muscle.    Most of the deep veins of the left lower extremity and in the left pelvis. The left external iliac vein is markedly attenuated. There is diffuse diminished enhancement of the left lower extremity deep veins, and likely filling defect within the small   caliber left femoral vein. There are venous varicosities are collateral within the left pelvis.    No bony abnormality. 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      Impression    IMPRESSION:  1.  Diffusely small caliber deep veins left lower extremity and left pelvis, with diffusely diminished enhancement and likely thrombus within the left femoral vein, as demonstrated on the recent ultrasound.  2.  Subcutaneous soft tissue stranding in the left lower leg and distal thigh may represent edema or cellulitis.     CT Tibia Fibula Lower Leg Left w Contrast     Status: None    Narrative    EXAM: CT TIBIA FIBULA LOWER  LEG LEFT W CONTRAST, CT FEMUR THIGH LEFT WITH CONTRAST  LOCATION: [facility]  DATE: 6/27/2024    INDICATION: Left lower extremity swelling. DVT.  COMPARISON: Ultrasound in 6/24/2024  TECHNIQUE: IV contrast. Axial, sagittal and coronal thin-section reconstruction. Dose reduction techniques were used.   CONTRAST: 90mL Isovue 370    FINDINGS:   Diffuse subcutaneous soft tissue stranding involving the left lower leg most marked distally, and extending into the distal thigh. No fluid collection. No significant deeper soft tissue abnormality. There is atrophy of the distal medial gastrocnemius   muscle.    Most of the deep veins of the left lower extremity and in the left pelvis. The left external iliac vein is markedly attenuated. There is diffuse diminished enhancement of the left lower extremity deep veins, and likely filling defect within the small   caliber left femoral vein. There are venous varicosities are collateral within the left pelvis.    No bony abnormality. 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      Impression    IMPRESSION:  1.  Diffusely small caliber deep veins left lower extremity and left pelvis, with diffusely diminished enhancement and likely thrombus within the left femoral vein, as demonstrated on the recent ultrasound.  2.  Subcutaneous soft tissue stranding in the left lower leg and distal thigh may represent edema or cellulitis.     Comprehensive metabolic panel     Status: Normal   Result Value Ref Range    Sodium 139 135 - 145 mmol/L    Potassium 4.0 3.4 - 5.3 mmol/L    Carbon Dioxide (CO2) 26 22 - 29 mmol/L    Anion Gap 10 7 - 15 mmol/L    Urea Nitrogen 9.8 8.0 - 23.0 mg/dL    Creatinine 0.66 0.51 - 0.95 mg/dL    GFR Estimate >90 >60 mL/min/1.73m2    Calcium 9.5 8.8 - 10.2 mg/dL    Chloride 103 98 - 107 mmol/L    Glucose 85 70 - 99 mg/dL    Alkaline Phosphatase 74 40 - 150 U/L    AST 27 0 - 45  U/L    ALT 22 0 - 50 U/L    Protein Total 7.6 6.4 - 8.3 g/dL    Albumin 4.6 3.5 - 5.2 g/dL    Bilirubin Total 0.4 <=1.2 mg/dL   CBC with platelets and differential     Status: Abnormal   Result Value Ref Range    WBC Count 4.9 4.0 - 11.0 10e3/uL    RBC Count 4.78 3.80 - 5.20 10e6/uL    Hemoglobin 13.2 11.7 - 15.7 g/dL    Hematocrit 40.3 35.0 - 47.0 %    MCV 84 78 - 100 fL    MCH 27.6 26.5 - 33.0 pg    MCHC 32.8 31.5 - 36.5 g/dL    RDW 15.2 (H) 10.0 - 15.0 %    Platelet Count 268 150 - 450 10e3/uL    % Neutrophils      % Lymphocytes      % Monocytes      % Eosinophils      % Basophils      % Immature Granulocytes      NRBCs per 100 WBC 0 <1 /100    Absolute Neutrophils      Absolute Lymphocytes      Absolute Monocytes      Absolute Eosinophils      Absolute Basophils      Absolute Immature Granulocytes      Absolute NRBCs 0.0 10e3/uL   Manual Differential     Status: None   Result Value Ref Range    % Neutrophils 45 %    % Lymphocytes 47 %    % Monocytes 6 %    % Eosinophils 1 %    % Basophils 1 %    Absolute Neutrophils 2.2 1.6 - 8.3 10e3/uL    Absolute Lymphocytes 2.3 0.8 - 5.3 10e3/uL    Absolute Monocytes 0.3 0.0 - 1.3 10e3/uL    Absolute Eosinophils 0.0 0.0 - 0.7 10e3/uL    Absolute Basophils 0.0 0.0 - 0.2 10e3/uL    RBC Morphology Confirmed RBC Indices     Platelet Assessment  Automated Count Confirmed. Platelet morphology is normal.     Automated Count Confirmed. Platelet morphology is normal.   CBC with platelets differential     Status: Abnormal    Narrative    The following orders were created for panel order CBC with platelets differential.  Procedure                               Abnormality         Status                     ---------                               -----------         ------                     CBC with platelets and d...[[id number]]  Abnormal            Final result               Manual Differential[[id number]]                              Final result                 Please view results  for these tests on the individual orders.     Medications   apixaban ANTICOAGULANT (ELIQUIS) tablet 10 mg (has no administration in time range)   sodium chloride 0.9 % bag 100 mL for CT (60 mLs Intravenous $Given 6/27/24 1950)   iopamidol (ISOVUE-370) solution 100 mL (90 mLs Intravenous $Given 6/27/24 1950)   oxyCODONE (ROXICODONE) tablet 5 mg (5 mg Oral $Given 6/27/24 2109)     Labs Ordered and Resulted from Time of ED Arrival to Time of ED Departure   CBC WITH PLATELETS AND DIFFERENTIAL - Abnormal       Result Value    WBC Count 4.9      RBC Count 4.78      Hemoglobin 13.2      Hematocrit 40.3      MCV 84      MCH 27.6      MCHC 32.8      RDW 15.2 (*)     Platelet Count 268      % Neutrophils        % Lymphocytes        % Monocytes        % Eosinophils        % Basophils        % Immature Granulocytes        NRBCs per 100 WBC 0      Absolute Neutrophils        Absolute Lymphocytes        Absolute Monocytes        Absolute Eosinophils        Absolute Basophils        Absolute Immature Granulocytes        Absolute NRBCs 0.0     COMPREHENSIVE METABOLIC PANEL - Normal    Sodium 139      Potassium 4.0      Carbon Dioxide (CO2) 26      Anion Gap 10      Urea Nitrogen 9.8      Creatinine 0.66      GFR Estimate >90      Calcium 9.5      Chloride 103      Glucose 85      Alkaline Phosphatase 74      AST 27      ALT 22      Protein Total 7.6      Albumin 4.6      Bilirubin Total 0.4     DIFFERENTIAL    % Neutrophils 45      % Lymphocytes 47      % Monocytes 6      % Eosinophils 1      % Basophils 1      Absolute Neutrophils 2.2      Absolute Lymphocytes 2.3      Absolute Monocytes 0.3      Absolute Eosinophils 0.0      Absolute Basophils 0.0      RBC Morphology Confirmed RBC Indices      Platelet Assessment        Value: Automated Count Confirmed. Platelet morphology is normal.     CT Tibia Fibula Lower Leg Left w Contrast   Final Result   IMPRESSION:   1.  Diffusely small caliber deep veins left lower extremity and left  pelvis, with diffusely diminished enhancement and likely thrombus within the left femoral vein, as demonstrated on the recent ultrasound.   2.  Subcutaneous soft tissue stranding in the left lower leg and distal thigh may represent edema or cellulitis.         CT Femur Thigh Left with Contrast   Final Result   IMPRESSION:   1.  Diffusely small caliber deep veins left lower extremity and left pelvis, with diffusely diminished enhancement and likely thrombus within the left femoral vein, as demonstrated on the recent ultrasound.   2.  Subcutaneous soft tissue stranding in the left lower leg and distal thigh may represent edema or cellulitis.                Critical care was not performed.     Medical Decision Making  The patient's presentation was of high complexity (an acute health issue posing potential threat to life or bodily function).    The patient's evaluation involved:  review of external note(s) from 3+ sources (clinical)  review of 3+ test result(s) ordered prior to this encounter (imaging)  ordering and/or review of 3+ test(s) in this encounter (see separate area of note for details)    The patient's management necessitated high risk (a decision regarding hospitalization).    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 Outpatient  duplex showed  DVT versus chronic venous changes of multiple components of the left femoral vein, popliteal veins, and peroneal veins. However, pt without pain and reports that her chronic swelling is actually better today than her baseline. No CP, SOB, DVT risk factors. Moreover, seen by outside vascular surgeon who reportedly did a duplex in early May consistent with peripheral vascular disease/venous stenosis and recommended angioplasty and stenting.     In ED patient is HDS/AF.  LLE is without overlying skin changes, she has no TTP, she has soft compartments.  She is neurovascularly  intact.    Given duplex showing DVT versus chronic venous changes, elected to obtain a CT venogram of LLE tonight which shows  diffusely diminished enhancement and likely thrombus within the left femoral vein.  Given patient has no pain, her swelling is better than her baseline, and she is neurovascularly intact, feel she is safe for discharge with p.o. apixaban and close vascular surgery follow-up for further evaluation of her stenosis and DVT treatment.  No evidence for cyanosis on exam.  CBC and CMP were unremarkable.  Discharged with strict ER return precautions and PCP follow-up as well.    I have reviewed the nursing notes. I have reviewed the findings, diagnosis, plan and need for follow up with the patient.    New Prescriptions    APIXABAN ANTICOAGULANT (ELIQUIS) 5 MG TABLET    Take 2 tablets (10 mg) by mouth 2 times daily for 7 days, THEN 1 tablet (5 mg) 2 times daily for 30 days.       Final diagnoses:   Deep vein thrombosis (DVT) of femoral vein of left lower extremity, unspecified chronicity (H)   Peripheral vascular disease (H24)         [facility] EMERGENCY DEPARTMENT  6/27/2024     Jay Lerma PA-C  06/27/24 2626

## 2024-06-27 NOTE — TELEPHONE ENCOUNTER
Writer relayed provider message below as written. She states that [employer] cannot schedule same day and she does not have a ride to get there today She asks if she can possibly schedule today to come in tomorrow as she has to schedule a day in advance.    Routing to providers to review and advise.    Jay Clark RN  [facility]

## 2024-06-27 NOTE — TELEPHONE ENCOUNTER
Called and spoke with patient.    Relayed provider's message below, verbatim.    Discussed possible implications of not being seen today, including respiratory arrest if the DVT dislodged.     Pt verbalized understanding and agreed to call 911 to have an ambulance take her down to [facility] for evaluation.    Renee Mcclain, RN, BSN  [facility]

## 2024-06-27 NOTE — TELEPHONE ENCOUNTER
I have no openings tomorrow  Can't wait- has a blood clot to the leg.   This can travel to the lungs and cause death- recommend emergency department evaluation if no ride- ambulance

## 2024-06-28 ENCOUNTER — VIRTUAL VISIT (OUTPATIENT)
Dept: FAMILY MEDICINE | Facility: CLINIC | Age: 64
End: 2024-06-28
Payer: COMMERCIAL

## 2024-06-28 ENCOUNTER — TELEPHONE (OUTPATIENT)
Dept: FAMILY MEDICINE | Facility: CLINIC | Age: 64
End: 2024-06-28

## 2024-06-28 DIAGNOSIS — G35 MULTIPLE SCLEROSIS (H): ICD-10-CM

## 2024-06-28 DIAGNOSIS — M54.12 CERVICAL RADICULAR PAIN: Primary | ICD-10-CM

## 2024-06-28 DIAGNOSIS — I82.4Z2 LOWER LEG DVT (DEEP VENOUS THROMBOEMBOLISM), ACUTE, LEFT (H): ICD-10-CM

## 2024-06-28 PROCEDURE — 99214 OFFICE O/P EST MOD 30 MIN: CPT | Mod: 95 | Performed by: NURSE PRACTITIONER

## 2024-06-28 RX ORDER — METHYLPREDNISOLONE 4 MG
TABLET, DOSE PACK ORAL
Qty: 21 TABLET | Refills: 0 | Status: SHIPPED | OUTPATIENT
Start: 2024-06-28

## 2024-06-28 NOTE — TELEPHONE ENCOUNTER
"Patient is calling after yesterday's ED visit for DVT.    Patient is requesting a prescription for oxycodone.  \"The 5 mg didn't do anything.  I need a higher dose.\"    A virtual visit was scheduled today with Debbie Starkey.    Gissell Givens RN, BSN  [facility]    "

## 2024-06-28 NOTE — PATIENT INSTRUCTIONS
PLAN:   1.   Symptomatic therapy suggested: will try short course of steroids to see if we can get any relief .  2.  Orders Placed This Encounter   Medications    methylPREDNISolone (MEDROL DOSEPAK) 4 MG tablet therapy pack     Sig: Follow package instructions     Dispense:  21 tablet     Refill:  0     3. FOLLOW UP WITH SPECIALIST :I spine and vascular specialist    Patient needs to follow up in if no improvement,or sooner if worsening of symptoms or other symptoms develop.

## 2024-06-28 NOTE — PROGRESS NOTES
"Instructions Relayed to Patient by Virtual Roomer:     Patient is active on [employer]:   Relayed following to patient: \"It looks like you are active on [employer], are you able to join the visit this way? If not, do you need us to send you a link now or would you like your provider to send a link via text or email when they are ready to initiate the visit?\"      Patient Confirmed they will join visit via: Email   Reminded patient to ensure they were logged on to virtual visit by arrival time listed.   Asked if patient has flexibility to initiate visit sooner than arrival time: patient is unable to initiate visit earlier than arrival time     If pediatric virtual visit, ensured pediatric patient along with parent/guardian will be present for video visit.     Patient offered the website [email or web address] and/or phone number to [employer] Help line: [phone]      Colletta is a 64 year old who is being evaluated via a billable video visit.    How would you like to obtain your AVS? [employer]  If the video visit is dropped, the invitation should be resent by: Send to e-mail at: [email or web address]   Will anyone else be joining your video visit? No          Subjective   Colletta is a 64 year old, presenting for the following health issues:  ER F/U      6/28/2024    10:53 AM   Additional Questions   Roomed by Bettye   Accompanied by self     Video Start Time: 2:25 PM    [facility]     ED/UC Followup:    Facility:  [facility] ED  Date of visit: 6/27/2024  Reason for visit: DVT  Current Status: Still having pain in neck, shoulder, upper arm and back would like higher dosage of Oxycodone. Pt was given oxycodone 5mg in ED.    Alicia says she has suffered with neck, upper back and left upper arm pain now for the last 7 years. Initially thought it was related to her MS, but MRI confirmed she has \"multilevel cervical spondylosis\" and \"severe bilateal neural foraminal stenosis at C5-C6.\" She has seen " several specialists and received multiple steroid injections, but nothing has helped the pain and numbness and tingling so far. Dr. Owen, her assigned PCP, referred her to see someone at [facility] [place], with whom she has an appointment next week on 24.     States her pain has been escalating recently in the last month   Was given option to do surgery,   Did take option for injection.    Did not tolerate the Gabapentin was itching that was years ago   Feels like needs something for the pain     Was just released from ER due new onset DVT   Was recommended needs appointment with hematology re evaluate further and time needing to be anticoagulated       Labs reviewed in EPIC  BP Readings from Last 3 Encounters:   24 122/68   24 122/79   24 139/85    Wt Readings from Last 3 Encounters:   24 73.2 kg (161 lb 6.4 oz)   24 74 kg (163 lb 3.2 oz)   24 72.1 kg (159 lb)                  Patient Active Problem List   Diagnosis    Multiple sclerosis (H)    Ankle swelling, left    Anxiety    Cigarette smoker    Fatigue    Meralgia paresthetica    Multinodular thyroid    Neuropathy    Cubital tunnel syndrome, left    Cervical radicular pain    Iliac vein stenosis, left    Lymphedema of both lower extremities     Past Surgical History:   Procedure Laterality Date    INJECT EPIDURAL CERVICAL Left 2024    Procedure: C7-T1 interlaminar epidural steroid injection;  Surgeon: Dorothy Frank MD;  Location: [facility] OR       Social History     Tobacco Use    Smoking status: Former     Current packs/day: 0.00     Types: Cigarettes     Quit date: 2017     Years since quittin.4    Smokeless tobacco: Never   Substance Use Topics    Alcohol use: Yes     Alcohol/week: 1.0 standard drink of alcohol     Types: 1 Glasses of wine per week     Comment: EVERY 2 WEEKS     Family History   Problem Relation Age of Onset    Multiple Sclerosis Father          Current Outpatient Medications   Medication Sig  Dispense Refill    Alpha-Lipoic Acid 300 MG CAPS TAKE ONE CAPSULE BY MOUTH EVERY DAY 30 capsule 11    apixaban ANTICOAGULANT (ELIQUIS) 5 MG tablet Take 2 tablets (10 mg) by mouth 2 times daily for 7 days, THEN 1 tablet (5 mg) 2 times daily for 30 days. 88 tablet 0    Ascorbic Acid (VITAMIN C PO) Take 1.5 teaspoonful by mouth daily       B Complex Vitamins (VITAMIN B COMPLEX PO) Take 1 tablet by mouth as needed       CALCIUM-VITAMIN D PO Take 1 tablet by mouth daily Vit d 7,0000 unit      cholecalciferol (VITAMIN D3) 5000 units (125 mcg) capsule TAKE ONE CAPSULE BY MOUTH EVERY DAY 30 capsule 11    Cyanocobalamin (VITAMIN B 12 PO) Take 2,500 mcg by mouth daily      Green Tea, Radha sinensis, (GREEN TEA PO) Liquid form      HEMP OIL OR EXTRACT OR OTHER CBD CANNABINOID, NOT MEDICAL CANNABIS,       Incontinence Supply Disposable (DEPEND FIT-FLEX-WOMEN-M) MISC USE 1-2 PER DAY 36 each 3    MAGNESIUM OXIDE PO Take 500 mg by mouth daily      melatonin 3 MG tablet Take 3 mg by mouth nightly as needed      methylPREDNISolone (MEDROL DOSEPAK) 4 MG tablet therapy pack Follow package instructions 21 tablet 0    Multiple Vitamins-Minerals (MULTIVITAMIN ADULT) TABS Take 1 tablet by mouth daily 30 tablet 11    Naltrexone POWD Take 4.5 mg by mouth At Bedtime 1 Bottle 3    Ocrelizumab (OCREVUS IV)       Omega-3 Fatty Acids (FISH OIL) 1200 MG capsule Take 1,200 mg by mouth daily      oxyBUTYnin ER (DITROPAN XL) 15 MG 24 hr tablet TAKE ONE TABLET BY MOUTH ONCE DAILY 30 tablet 11    Probiotic Product (ACIDOPHILUS PROBIOTIC BLEND) CAPS TAKE ONE CAPSULE BY MOUTH EVERY DAY 30 capsule 11    TURMERIC PO       amantadine (SYMMETREL) 100 MG capsule Take 1 capsule (100 mg) by mouth 2 times daily (Patient not taking: Reported on 6/24/2024) 60 capsule 11    benzonatate (TESSALON) 100 MG capsule Take 1-2 capsules by mouth up to 3 times daily as needed for cough. (Patient not taking: Reported on 6/24/2024) 30 capsule 0    Lactobacillus  "(ACIDOPHILUS) CAPS TAKE ONE CAPSULE BY MOUTH ONCE DAILY (Patient not taking: Reported on 6/24/2024) 30 capsule 10    Vitamin A 78047 units CAPS  (Patient not taking: Reported on 6/24/2024)       Allergies   Allergen Reactions    Compazine [Prochlorperazine] Swelling     Swelling of tongue about 25 years ago             Review of Systems  Constitutional, HEENT, cardiovascular, pulmonary, gi and gu systems are negative, except as otherwise noted.      Objective           Vitals:  No vitals were obtained today due to virtual visit.    Physical Exam   GENERAL: alert and no distress  EYES: Eyes grossly normal to inspection and conjunctivae and sclerae normal  HENT: normal cephalic/atraumatic and oral mucous membranes moist  RESP: No audible wheeze, cough, or visible cyanosis.    MS: No gross musculoskeletal defects noted.  Normal range of motion.  No visible edema.  SKIN: Visible skin clear. No significant rash, abnormal pigmentation or lesions.  NEURO: mentation intact  PSYCH: Appropriate affect, tone, and pace of words      Assessment & Plan     Cervical radicular pain  Will Start:  Symptomatic therapy suggested: rest and call prn if symptoms persist or worsen.  prescription for NSAID given and consider referral to Physical Therapy  Will Start:  - methylPREDNISolone (MEDROL DOSEPAK) 4 MG tablet therapy pack  Dispense: 21 tablet; Refill: 0    Multiple sclerosis (H)  FOLLOW UP WITH SPECIALIST :Neurology      Lower leg DVT (deep venous thromboembolism), acute, left (H)  Referral ordered follow up as indicated with specialist    - Adult Oncology/Hematology  [facility]    MED REC REQUIRED  Post Medication Reconciliation Status: discharge medications reconciled, continue medications without change  BMI  Estimated body mass index is 26.86 kg/m  as calculated from the following:    Height as of 5/30/24: 1.651 m (5' 5\").    Weight as of 6/24/24: 73.2 kg (161 lb 6.4 oz).         See Patient Instructions  Patient " Instructions   PLAN:   1.   Symptomatic therapy suggested: will try short course of steroids to see if we can get any relief .  2.  Orders Placed This Encounter   Medications    methylPREDNISolone (MEDROL DOSEPAK) 4 MG tablet therapy pack     Sig: Follow package instructions     Dispense:  21 tablet     Refill:  0     3. FOLLOW UP WITH SPECIALIST :I spine and vascular specialist    Patient needs to follow up in if no improvement,or sooner if worsening of symptoms or other symptoms develop.            Video-Visit Details    Type of service:  Video Visit   Video End Time:2:47 PM  Originating Location (pt. Location): Home    Distant Location (provider location):  On-site  Platform used for Video Visit: Jim  Signed Electronically by: OSITO Perez CNP

## 2024-06-28 NOTE — CONSULTS
"Consult received for Vascular access care.  ED staff able to obtain access.  For additional needs place \"Nursing to Consult for Vascular Access\" [id number] order in EPIC.    "

## 2024-06-28 NOTE — TELEPHONE ENCOUNTER
"Called patient to triage symptoms prior to her appointment with OSITO Silvestre, CNP today.     Alicia says she has suffered with neck, upper back and left upper arm pain now for the last 7 years. Initially thought it was related to her MS, but MRI confirmed she has \"multilevel cervical spondylosis\" and \"severe bilateal neural foraminal stenosis at C5-C6.\" She has seen several specialists and received multiple steroid injections, but nothing has helped the pain and numbness and tingling so far. Dr. Owen, her assigned PCP, referred her to see someone at [facility], with whom she has an appointment next week on 7/03/24.    Pt reports intermittent numbness and tingling down the left arm and hand. Pain is constant, but gets intermittently worse at times. Reports that the pain has been waking her up in the middle of the night and is affecting her daily life because she is \"always tired.\" Was given 5mg of oxycodone in the ED last night for her neck pain (no mention as to why this was given in the ED note) , but pt says \"it didn't touch the pain.\" Says she has also tried gabapentin in the past, but had an allergic reaction to it (not listed on allergy list).     Pt would like to discuss her neck pain today and if there is something that she can be prescribed for pain in the interim before her [facility] appointment next week, since Dr. Owen is on vacation. Routing to provider to review.     Renee Mcclain RN, BSN  [facility]       "

## 2024-06-28 NOTE — DISCHARGE INSTRUCTIONS
You have a blood clot in your leg that we will treat with a blood thinner.  It also appears that you have some narrowing of the blood vessels in your leg as well.  You will need to follow-up with the vascular surgeon for further evaluation and treatment of this.  Return to the ER if you develop chest pain, shortness of breath, fever or worsening swelling or pain in your leg.

## 2024-07-03 ENCOUNTER — TRANSFERRED RECORDS (OUTPATIENT)
Dept: HEALTH INFORMATION MANAGEMENT | Facility: CLINIC | Age: 64
End: 2024-07-03
Payer: COMMERCIAL

## 2024-07-03 ENCOUNTER — TELEPHONE (OUTPATIENT)
Dept: OTHER | Facility: CLINIC | Age: 64
End: 2024-07-03
Payer: COMMERCIAL

## 2024-07-03 NOTE — TELEPHONE ENCOUNTER
Patient calling in regards to her blood thinner that was prescribed on 6/27/24. She is asking how long she has to be on the medication. Writer was explaining to patient that I was going over the prescription to read the order. Patient interrupted writer and asked to speak to a provider who prescribed the medication.    Writer continued to explain I could tell her how long to be on the medication as follows:      Patient continued to interrupt and ask to be to transferred to the doctor who prescribed the medication. Writer explained that he cannot guarantee that the provider themself would be able to call her back but the message can be routed to the ordering provider for review. She states her PCP is Dr. Owen in [place]. Writer told patient this message would be routed to the ordering provider for review.    Routing to ordering provider to review and advise patient how long she needs to be on medication and if provider is going to order more after these doses as she did not want to hear from the nurse.    Jay Clark RN  [facility]

## 2024-07-03 NOTE — TELEPHONE ENCOUNTER
Writer reviewed chart, noted eliquis currently prescribed for 6/27-8/3 by ED provider for DVT. ED provider recommended outpatient follow up with specialist after discharge. Dr. Owen out of office.    Since patient did not want to speak with RN staff, routing to provider who completed ed/hospital follow up visit 6/28 to advise on how long patient should be on eliquis and what kind of follow up she is to do re: DVT, thank you!      Mami Rutledge, RN, BSN  [facility]

## 2024-07-04 NOTE — TELEPHONE ENCOUNTER
I have make referral for hematology to help evaluate   Please be aware that coverage of these services is subject to the terms and limitations of your health insurance plan.  Call member services at your health plan with any benefit or coverage questions.   [facility] will call you to coordinate your care as prescribed by the provider.  If you don t hear from a representative within 2 business days, please call [phone]

## 2024-07-05 ENCOUNTER — TELEPHONE (OUTPATIENT)
Dept: VASCULAR SURGERY | Facility: CLINIC | Age: 64
End: 2024-07-05
Payer: COMMERCIAL

## 2024-07-05 NOTE — TELEPHONE ENCOUNTER
[facility] Call Center    Phone Message    May a detailed message be left on voicemail: yes     Reason for Call: Other: Pt returning call to confirm how much longer she needs to take the blood thinners. Please call to advise.     Action Taken: Message routed to:  [facility]    Travel Screening: Not Applicable

## 2024-07-05 NOTE — TELEPHONE ENCOUNTER
Returning call to pt.     Informed her that I am not sure how long she will be on an anti-coagulation medication in which it can be a very long time or it can be  short time. However length of medication can be determined by her PCP and continually monitored.   I also said that we can talk further in detail to Dr. Rivera when we see her in clinic next week also.     Jazmyn VANCE RN, BSN  Interventional Radiology/Vascular  Nurse Coordinator   Phone: [phone]

## 2024-07-08 NOTE — TELEPHONE ENCOUNTER
Patient notified of provider message as written.  Patient verbalized understanding.  Kristina Kjellberg, MSN, RN

## 2024-07-10 NOTE — PROGRESS NOTES
INTERVENTIONAL RADIOLOGY CONSULTATION    Name: Colletta Sorrell  Age: 64 year old       HPI:   Alicia Starkey is a 65 yo with a history of MS diagnosedin 2013. About 6 months ago was working with a physical therapist related to her MS and was found to have lower extremity swelling, much worse on the left. Was seeing a lymphedema specialist then to help with her swelling. This did not help much.    Eventually in May of this year she went to [facility] about some cervical issues. Around that time due to her persistent left lower extremity swelling underwent an ultrasound which showed nonocclusive thrombus in the left leg. She then started anticoagulation last night.     Has worn compression stockings in the past but is not presently wearing them. She started wearing these about 4-5 months ago. They were knee high compression stockings.    The swelling in her left leg is bad even in the morning. It sometimes worsens throughout the day but since anticoagulation was started has noticed it has not been worsening as much. Improves with elevation. Doesn't get too much exercise, primarily because energy level is poor, but also due to functional/mobility limitations related to MS. She does do some pedaling exercise but not too much walking.    She started eliquis last week.    No difficulty breathing. Has occasional chest pain which gets better as soon as she drinks water. She did see a heart doctor a couple years ago. Denies heart disease. Denies lung disease.    He was seen by a provider at [facility]. They recommended stenting of the left common iliac vein by an ultrasound which suggested May thurner.    ROS  Negative unless otherwise stated as above.    PAST MEDICAL HISTORY:   Past Medical History:   Diagnosis Date    Cubital tunnel syndrome     Meralgia paraesthetica     MS (multiple sclerosis) (H)     Multinodular thyroid     Neuropathy        PAST SURGICAL HISTORY:   Past Surgical History:   Procedure Laterality  Date    INJECT EPIDURAL CERVICAL Left 2024    Procedure: C7-T1 interlaminar epidural steroid injection;  Surgeon: Dorothy Frank MD;  Location: [facility] OR       FAMILY HISTORY:   Family History   Problem Relation Age of Onset    Multiple Sclerosis Father        SOCIAL HISTORY:   Social History     Tobacco Use    Smoking status: Former     Current packs/day: 0.00     Types: Cigarettes     Quit date: 2017     Years since quittin.5    Smokeless tobacco: Never   Substance Use Topics    Alcohol use: Yes     Alcohol/week: 1.0 standard drink of alcohol     Types: 1 Glasses of wine per week     Comment: EVERY 2 WEEKS       PROBLEM LIST:   Patient Active Problem List    Diagnosis Date Noted    Iliac vein stenosis, left 2024     Priority: Medium    Lymphedema of both lower extremities 2024     Priority: Medium    Cervical radicular pain 2024     Priority: Medium    Cubital tunnel syndrome, left 2018     Priority: Medium    Multiple sclerosis (H) 2017     Priority: Medium    Ankle swelling, left 2017     Priority: Medium    Fatigue 2014     Priority: Medium    Multinodular thyroid 2014     Priority: Medium    Meralgia paresthetica 2013     Priority: Medium    Neuropathy 2013     Priority: Medium    Anxiety 2011     Priority: Medium    Cigarette smoker 2010     Priority: Medium       MEDICATIONS:   Prescription Medications as of 7/10/2024         Rx Number Disp Refills Start End Last Dispensed Date Next Fill Date Owning Pharmacy    Alpha-Lipoic Acid 300 MG CAPS  30 capsule 11 2021 --   [facility] - [place], [place] - [address]     Sig: TAKE ONE CAPSULE BY MOUTH EVERY DAY    Class: E-Prescribe    amantadine (SYMMETREL) 100 MG capsule  60 capsule 11 2023 --   [id number] - [place], [place] [address] AT [facility] [address]  &     Sig: Take 1 capsule (100 mg) by mouth 2 times daily     Class: E-Prescribe    Route: Oral    apixaban ANTICOAGULANT (ELIQUIS) 5 MG tablet  88 tablet 0 6/27/2024 8/3/2024   [facility] #[id number] - [place], [place] - [address] AT [facility] [address]    Sig: Take 2 tablets (10 mg) by mouth 2 times daily for 7 days, THEN 1 tablet (5 mg) 2 times daily for 30 days.    Class: E-Prescribe    Route: Oral    Ascorbic Acid (VITAMIN C PO)  -- -- 6/19/2018 --       Sig: Take 1.5 teaspoonful by mouth daily     Class: Historical    Route: Oral    B Complex Vitamins (VITAMIN B COMPLEX PO)  -- --  --       Sig: Take 1 tablet by mouth as needed     Class: Historical    Route: Oral    benzonatate (TESSALON) 100 MG capsule  30 capsule 0 5/31/2022 --   [facility] #[id number] - [place], [place] - [address] [facility] [address]    Sig: Take 1-2 capsules by mouth up to 3 times daily as needed for cough.    Class: E-Prescribe    CALCIUM-VITAMIN D PO  -- -- 6/19/2018 --       Sig: Take 1 tablet by mouth daily Vit d 7,0000 unit    Class: Historical    Route: Oral    cholecalciferol (VITAMIN D3) 5000 units (125 mcg) capsule  30 capsule 11 3/16/2020 --   [facility] [place], [place] - [facility] [address]    Sig: TAKE ONE CAPSULE BY MOUTH EVERY DAY    Class: E-Prescribe    Cyanocobalamin (VITAMIN B 12 PO)  -- -- 6/19/2018 --       Sig: Take 2,500 mcg by mouth daily    Class: Historical    Route: Oral    Green Tea, Radha sinensis, (GREEN TEA PO)  -- --  --   [facility] [place] [address]    Sig: Liquid form    Class: Historical    Route: Oral    HEMP OIL OR EXTRACT OR OTHER CBD CANNABINOID, NOT MEDICAL CANNABIS,  -- --  --   [facility] [place], [place] - [facility] [address]    Class: Historical    Incontinence Supply Disposable (DEPEND FIT-FLEX-WOMEN-M) MISC  36 each 3 11/13/2023 --   [facility] - [place], [place] - [address]    Sig: USE 1-2 PER DAY     Class: E-Prescribe    Lactobacillus (ACIDOPHILUS) CAPS  30 capsule 10 4/21/2021 --   [facility], [place] - [address]    Sig: TAKE ONE CAPSULE BY MOUTH ONCE DAILY    Class: E-Prescribe    MAGNESIUM OXIDE PO  -- -- 6/19/2018 --       Sig: Take 500 mg by mouth daily    Class: Historical    Route: Oral    melatonin 3 MG tablet  -- -- 7/29/2014 --       Sig: Take 3 mg by mouth nightly as needed    Class: Historical    Route: Oral    methylPREDNISolone (MEDROL DOSEPAK) 4 MG tablet therapy pack  21 tablet 0 6/28/2024 --   [id number] - [place], [place] - [address] [facility] [address]    Sig: Follow package instructions    Class: E-Prescribe    Multiple Vitamins-Minerals (MULTIVITAMIN ADULT) TABS  30 tablet 11 9/25/2018 --   [facility] [address]    Sig: Take 1 tablet by mouth daily    Class: E-Prescribe    Route: Oral    Naltrexone POWD  1 Bottle 3 5/31/2019 --   [place] [address]    Sig: Take 4.5 mg by mouth At Bedtime    Class: E-Prescribe    Route: Oral    Ocrelizumab (OCREVUS IV)  -- --  --       Class: Historical    Route: Intravenous    Omega-3 Fatty Acids (FISH OIL) 1200 MG capsule  -- -- 6/19/2018 --       Sig: Take 1,200 mg by mouth daily    Class: Historical    Route: Oral    oxyBUTYnin ER (DITROPAN XL) 15 MG 24 hr tablet  30 tablet 11 9/25/2023 --   [facility] [address]    Sig: TAKE ONE TABLET BY MOUTH ONCE DAILY    Class: E-Prescribe    Probiotic Product (ACIDOPHILUS PROBIOTIC BLEND) CAPS  30 capsule 11 3/16/2020 --   [facility], [place] - [address]    Sig: TAKE ONE CAPSULE BY MOUTH EVERY DAY    Class: E-Prescribe    TURMERIC PO  -- --  --   [facility] [place] [address] [address]    Class: Historical    Route: Oral    Vitamin A  "93462 units CAPS  -- --  --   [facility] - [place], [place] - [address]     Class: Historical    Route: Oral            ALLERGIES:   Compazine [prochlorperazine]    ROS:  Negative unless otherwise stated in HPI.      Physical Examination:   VITALS:   BP (!) 140/80 (BP Location: Left arm, Patient Position: Chair, Cuff Size: Adult Regular)   Pulse 60   SpO2 100%     CONSTITUTIONAL: healthy, alert and no distress.   PSYCHIATRIC: mentation appears normal and affect normal.   EYES: No jaundice or pallor.   Legs: 2-3+ edema at left ankle. 1+ edema at right ankle.    Labs:    BMP RESULTS:  Lab Results   Component Value Date     06/27/2024     08/14/2019    POTASSIUM 4.0 06/27/2024    POTASSIUM 3.9 08/14/2019    CHLORIDE 103 06/27/2024    CHLORIDE 110 (H) 08/14/2019    CO2 26 06/27/2024    CO2 28 08/14/2019    ANIONGAP 10 06/27/2024    ANIONGAP 5 08/14/2019    GLC 85 06/27/2024    GLC 86 08/14/2019    BUN 9.8 06/27/2024    BUN 6 (L) 08/14/2019    CR 0.66 06/27/2024    CR 0.70 08/14/2019    GFRESTIMATED >90 06/27/2024    GFRESTIMATED >90 08/14/2019    GFRESTBLACK >90 08/14/2019    SHRUTHI 9.5 06/27/2024    SHRUTHI 9.0 08/14/2019        CBC RESULTS:  Lab Results   Component Value Date    WBC 4.9 06/27/2024    WBC 3.2 (L) 08/14/2019    RBC 4.78 06/27/2024    RBC 4.59 08/14/2019    HGB 13.2 06/27/2024    HGB 12.9 08/14/2019    HCT 40.3 06/27/2024    HCT 40.1 08/14/2019    MCV 84 06/27/2024    MCV 87 08/14/2019    MCH 27.6 06/27/2024    MCH 28.1 08/14/2019    MCHC 32.8 06/27/2024    MCHC 32.2 08/14/2019    RDW 15.2 (H) 06/27/2024    RDW 14.6 08/14/2019     06/27/2024     08/14/2019       INR/PTT:  No results found for: \"INR\", \"PTT\"    Diagnostic studies:   Left US Venous Competency 6/24/24:  IMPRESSION:     1. LEFT LEG:       A. Superficial venous incompetency study not performed due to  deep venous thrombosis.       B. Non occlusive deep venous thrombosis or chronic " "venous  changes:            i. Common femoral vein.            ii. Profundus femoral vein.            iii. Femoral vein in the mid and distal thigh.            iv. Popliteal vein.            v. Peroneal veins.       C. Common femoral vein incompetent above the saphenofemoral  junction with Valsalva.       D. Femoral vein incompetent in the mid and distal thigh.     Reference: \"Duplex Ultrasound in the Diagnosis of Lower-Extremity Deep  Venous Thrombosis\"- Amanda Fowler MD, S; Darnell Rivera MD  (Circulation. 2014;129:917-921. http://circ.ahajournals.org)     ALEIDA HUTCHINS MD     IMPRESSION:  1. 3.67 velocity ratio from the mid to central left common iliac vein.  Etiology not demonstrated. CTA/CTV could be performed for further  evaluation.     2. Central left common iliac vein patent with a phasic waveform.      3. Peripheral left common iliac vein and central through mid left  external iliac vein obscured.     4. Non occlusive deep venous thrombosis or chronic venous changes in  the left external iliac vein. Central extent not visualized in this  study.     I have personally reviewed the examination and initial interpretation  and I agree with the findings.     ALEIDA HUTCHINS MD     Assessment   65 yo w/ MS who presents with bilateral lower extremity edema, much worse on left. She had a venous competency ultrasound 6/24/24 which is suggestive of chronic thrombus venous changes throughout most of the left lower extremity deep veins. Duplex evaluation of the iliac veins did demonstrate an increased velocity ratio in the iliac vein which can suggest venous narrowing which can be seen on CT in 2022 as well, though it could be a normal variant. She does not have any clot in the left iliac veins. As far as history, these symptoms are quite chronic, lasting at least 6 months. 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 Before jumping to " venography and interventions like stenting I would recommend conservative management with anticoagulation and medical grade compression stockings. She has already started eliquis. She has an appointment with the [facility] clinic which I recommended that she keep so that they can manage her anticoagulation and evaulate for the etiology of her DVT.    Plan   - Prescription for medical grade thigh high compression stockings  - Continue anticoagulation. Patient has appointment with Hematology 8/15/24, will defer management of anticoagulation to them with workup for pro thrombotic disorder.  - Follow up visit in 3 months with Bilateral LE venous Duplex. At that time depending on severity of symptoms can consider whether or not to proceed with LLE venography to evaluate for May-Thurner.      Kermit Loomis MD  Integrated IR/DR Resident PGY-6    Patient was seen with interventional radiology fellow/medical student.    I have verified the history and personally performed the medical decision-  making. I agree with the assessment and plan of care as documented in the note.      CC  Patient Care Team:  [facility] as PCP - General  Denisse Mata MD as MD (Neurology)  Barrera Shannon MD as MD (Neurology)  Karen Hunter NP as Assigned Neuroscience Provider  Lionel Owen MD as Assigned PCP

## 2024-07-11 ENCOUNTER — OFFICE VISIT (OUTPATIENT)
Dept: VASCULAR SURGERY | Facility: CLINIC | Age: 64
End: 2024-07-11
Payer: COMMERCIAL

## 2024-07-11 VITALS — HEART RATE: 60 BPM | DIASTOLIC BLOOD PRESSURE: 80 MMHG | OXYGEN SATURATION: 100 % | SYSTOLIC BLOOD PRESSURE: 140 MMHG

## 2024-07-11 DIAGNOSIS — I87.1 MAY-THURNER SYNDROME: ICD-10-CM

## 2024-07-11 DIAGNOSIS — I82.409 DVT (DEEP VENOUS THROMBOSIS) (H): ICD-10-CM

## 2024-07-11 DIAGNOSIS — I87.1 ILIAC VEIN STENOSIS, LEFT: ICD-10-CM

## 2024-07-11 DIAGNOSIS — I83.893 SYMPTOMATIC VARICOSE VEINS OF BOTH LOWER EXTREMITIES: Primary | ICD-10-CM

## 2024-07-11 PROCEDURE — 99203 OFFICE O/P NEW LOW 30 MIN: CPT | Mod: GC | Performed by: STUDENT IN AN ORGANIZED HEALTH CARE EDUCATION/TRAINING PROGRAM

## 2024-07-11 NOTE — Clinical Note
Fup in 3 mos with Dr. Rivera  Please make sure to get an US lower venous study prior to fup appt  All appts can be on the same day. Or if it helps pt can get imaging on separate day and then virtual telephone or video on different day.    Jazmyn VANCE RN, BSN Interventional Radiology/Vascular  Nurse Coordinator  Phone: [phone]

## 2024-07-11 NOTE — LETTER
7/11/2024       RE: Colletta Dwen Sorrell  [address] [facility] [address]     Dear Colleague,    Thank you for referring your patient, Colletta Dwen Sorrell, to the [facility] [place] at [facility]. Please see a copy of my visit note below.        INTERVENTIONAL RADIOLOGY CONSULTATION    Name: Colletta Sorrell  Age: 64 year old       HPI:   Alicia Starkey is a 65 yo with a history of MS diagnosedin 2013. About 6 months ago was working with a physical therapist related to her MS and was found to have lower extremity swelling, much worse on the left. Was seeing a lymphedema specialist then to help with her swelling. This did not help much.    Eventually in May of this year she went to [facility] about some cervical issues. Around that time due to her persistent left lower extremity swelling underwent an ultrasound which showed nonocclusive thrombus in the left leg. She then started anticoagulation last night.     Has worn compression stockings in the past but is not presently wearing them. She started wearing these about 4-5 months ago. They were knee high compression stockings.    The swelling in her left leg is bad even in the morning. It sometimes worsens throughout the day but since anticoagulation was started has noticed it has not been worsening as much. Improves with elevation. Doesn't get too much exercise, primarily because energy level is poor, but also due to functional/mobility limitations related to MS. She does do some pedaling exercise but not too much walking.    She started eliquis last week.    No difficulty breathing. Has occasional chest pain which gets better as soon as she drinks water. She did see a heart doctor a couple years ago. Denies heart disease. Denies lung disease.    He was seen by a provider at [facility]. They recommended stenting of the left common iliac vein by an ultrasound which suggested May  thurner.    ROS  Negative unless otherwise stated as above.    PAST MEDICAL HISTORY:   Past Medical History:   Diagnosis Date     Cubital tunnel syndrome      Meralgia paraesthetica      MS (multiple sclerosis) (H)      Multinodular thyroid      Neuropathy        PAST SURGICAL HISTORY:   Past Surgical History:   Procedure Laterality Date     INJECT EPIDURAL CERVICAL Left 2024    Procedure: C7-T1 interlaminar epidural steroid injection;  Surgeon: Dorothy Frank MD;  Location: [facility] OR       FAMILY HISTORY:   Family History   Problem Relation Age of Onset     Multiple Sclerosis Father        SOCIAL HISTORY:   Social History     Tobacco Use     Smoking status: Former     Current packs/day: 0.00     Types: Cigarettes     Quit date: 2017     Years since quittin.5     Smokeless tobacco: Never   Substance Use Topics     Alcohol use: Yes     Alcohol/week: 1.0 standard drink of alcohol     Types: 1 Glasses of wine per week     Comment: EVERY 2 WEEKS       PROBLEM LIST:   Patient Active Problem List    Diagnosis Date Noted     Iliac vein stenosis, left 2024     Priority: Medium     Lymphedema of both lower extremities 2024     Priority: Medium     Cervical radicular pain 2024     Priority: Medium     Cubital tunnel syndrome, left 2018     Priority: Medium     Multiple sclerosis (H) 2017     Priority: Medium     Ankle swelling, left 2017     Priority: Medium     Fatigue 2014     Priority: Medium     Multinodular thyroid 2014     Priority: Medium     Meralgia paresthetica 2013     Priority: Medium     Neuropathy 2013     Priority: Medium     Anxiety 2011     Priority: Medium     Cigarette smoker 2010     Priority: Medium       MEDICATIONS:   Prescription Medications as of 7/10/2024         Rx Number Disp Refills Start End Last Dispensed Date Next Fill Date Owning Pharmacy    Alpha-Lipoic Acid 300 MG CAPS  30 capsule 11 2021 --   [facility] - [place], [place] - [address]    Sig: TAKE ONE CAPSULE BY MOUTH EVERY DAY    Class: E-Prescribe    amantadine (SYMMETREL) 100 MG capsule  60 capsule 11 8/29/2023 --   [id number] - [place], [place] - [address] AT [facility] [address]    Sig: Take 1 capsule (100 mg) by mouth 2 times daily    Class: E-Prescribe    Route: Oral    apixaban ANTICOAGULANT (ELIQUIS) 5 MG tablet  88 tablet 0 6/27/2024 8/3/2024   [id number] - [place], [place] - [address] AT [address]    Sig: Take 2 tablets (10 mg) by mouth 2 times daily for 7 days, THEN 1 tablet (5 mg) 2 times daily for 30 days.    Class: E-Prescribe    Route: Oral    Ascorbic Acid (VITAMIN C PO)  -- -- 6/19/2018 --       Sig: Take 1.5 teaspoonful by mouth daily     Class: Historical    Route: Oral    B Complex Vitamins (VITAMIN B COMPLEX PO)  -- --  --       Sig: Take 1 tablet by mouth as needed     Class: Historical    Route: Oral    benzonatate (TESSALON) 100 MG capsule  30 capsule 0 5/31/2022 --   [id number] - [place], [place] - [address] AT [facility] & [facility]    Sig: Take 1-2 capsules by mouth up to 3 times daily as needed for cough.    Class: E-Prescribe    CALCIUM-VITAMIN D PO  -- -- 6/19/2018 --       Sig: Take 1 tablet by mouth daily Vit d 7,0000 unit    Class: Historical    Route: Oral    cholecalciferol (VITAMIN D3) 5000 units (125 mcg) capsule  30 capsule 11 3/16/2020 --   [facility] - [place], [place] - [address]    Sig: TAKE ONE CAPSULE BY MOUTH EVERY DAY    Class: E-Prescribe    Cyanocobalamin (VITAMIN B 12 PO)  -- -- 6/19/2018 --       Sig: Take 2,500 mcg by mouth daily    Class: Historical    Route: Oral    Green Tea, Radha sinensis, (GREEN TEA PO)  -- --  --   [facility] - [place], [place] - [address]    Sig: Liquid form    Class: Historical    Route: Oral    HEMP OIL OR  EXTRACT OR OTHER CBD CANNABINOID, NOT MEDICAL CANNABIS,  -- --  --   [facility] - [place] [address]    Class: Historical    Incontinence Supply Disposable (DEPEND FIT-FLEX-WOMEN-M) MISC  36 each 3 11/13/2023 --   [facility] [place] [address]    Sig: USE 1-2 PER DAY    Class: E-Prescribe    Lactobacillus (ACIDOPHILUS) CAPS  30 capsule 10 4/21/2021 --   [facility] [place] [address]    Sig: TAKE ONE CAPSULE BY MOUTH ONCE DAILY    Class: E-Prescribe    MAGNESIUM OXIDE PO  -- -- 6/19/2018 --       Sig: Take 500 mg by mouth daily    Class: Historical    Route: Oral    melatonin 3 MG tablet  -- -- 7/29/2014 --       Sig: Take 3 mg by mouth nightly as needed    Class: Historical    Route: Oral    methylPREDNISolone (MEDROL DOSEPAK) 4 MG tablet therapy pack  21 tablet 0 6/28/2024 --   [id number] - [place], [place] - [address] AT [facility] [address]    Sig: Follow package instructions    Class: E-Prescribe    Multiple Vitamins-Minerals (MULTIVITAMIN ADULT) TABS  30 tablet 11 9/25/2018 --   [facility] [address] [address]    Sig: Take 1 tablet by mouth daily    Class: E-Prescribe    Route: Oral    Naltrexone POWD  1 Bottle 3 5/31/2019 --   [facility] [place] [place] - [address]    Sig: Take 4.5 mg by mouth At Bedtime    Class: E-Prescribe    Route: Oral    Ocrelizumab (OCREVUS IV)  -- --  --       Class: Historical    Route: Intravenous    Omega-3 Fatty Acids (FISH OIL) 1200 MG capsule  -- -- 6/19/2018 --       Sig: Take 1,200 mg by mouth daily    Class: Historical    Route: Oral    oxyBUTYnin ER (DITROPAN XL) 15 MG 24 hr tablet  30 tablet 11 9/25/2023 --   [facility] [place] [address]    Sig: TAKE ONE TABLET BY MOUTH ONCE DAILY    Class: E-Prescribe    Probiotic Product  (ACIDOPHILUS PROBIOTIC BLEND) CAPS  30 capsule 11 3/16/2020 --   [facility] - [place] [address]    Sig: TAKE ONE CAPSULE BY MOUTH EVERY DAY    Class: E-Prescribe    TURMERIC PO  -- --  --   [facility] [place] [address] [address]    Class: Historical    Route: Oral    Vitamin A 59209 units CAPS  -- --  --   [facility] [place] [address] [address]    Class: Historical    Route: Oral            ALLERGIES:   Compazine [prochlorperazine]    ROS:  Negative unless otherwise stated in HPI.      Physical Examination:   VITALS:   BP (!) 140/80 (BP Location: Left arm, Patient Position: Chair, Cuff Size: Adult Regular)   Pulse 60   SpO2 100%     CONSTITUTIONAL: healthy, alert and no distress.   PSYCHIATRIC: mentation appears normal and affect normal.   EYES: No jaundice or pallor.   Legs: 2-3+ edema at left ankle. 1+ edema at right ankle.    Labs:    BMP RESULTS:  Lab Results   Component Value Date     06/27/2024     08/14/2019    POTASSIUM 4.0 06/27/2024    POTASSIUM 3.9 08/14/2019    CHLORIDE 103 06/27/2024    CHLORIDE 110 (H) 08/14/2019    CO2 26 06/27/2024    CO2 28 08/14/2019    ANIONGAP 10 06/27/2024    ANIONGAP 5 08/14/2019    GLC 85 06/27/2024    GLC 86 08/14/2019    BUN 9.8 06/27/2024    BUN 6 (L) 08/14/2019    CR 0.66 06/27/2024    CR 0.70 08/14/2019    GFRESTIMATED >90 06/27/2024    GFRESTIMATED >90 08/14/2019    GFRESTBLACK >90 08/14/2019    SHRUTHI 9.5 06/27/2024    SHRUTHI 9.0 08/14/2019        CBC RESULTS:  Lab Results   Component Value Date    WBC 4.9 06/27/2024    WBC 3.2 (L) 08/14/2019    RBC 4.78 06/27/2024    RBC 4.59 08/14/2019    HGB 13.2 06/27/2024    HGB 12.9 08/14/2019    HCT 40.3 06/27/2024    HCT 40.1 08/14/2019    MCV 84 06/27/2024    MCV 87 08/14/2019    MCH 27.6 06/27/2024    MCH 28.1 08/14/2019    MCHC 32.8 06/27/2024    MCHC 32.2 08/14/2019    RDW 15.2 (H) 06/27/2024    RDW 14.6 08/14/2019  "    06/27/2024     08/14/2019       INR/PTT:  No results found for: \"INR\", \"PTT\"    Diagnostic studies:   Left US Venous Competency 6/24/24:  IMPRESSION:     1. LEFT LEG:       A. Superficial venous incompetency study not performed due to  deep venous thrombosis.       B. Non occlusive deep venous thrombosis or chronic venous  changes:            i. Common femoral vein.            ii. Profundus femoral vein.            iii. Femoral vein in the mid and distal thigh.            iv. Popliteal vein.            v. Peroneal veins.       C. Common femoral vein incompetent above the saphenofemoral  junction with Valsalva.       D. Femoral vein incompetent in the mid and distal thigh.     Reference: \"Duplex Ultrasound in the Diagnosis of Lower-Extremity Deep  Venous Thrombosis\"- Amanda Fowler MD, S; Darnell Rivera MD  (Circulation. 2014;129:917-921. http://circ.ahajournals.org)     ALEIDA HUTCHINS MD     IMPRESSION:  1. 3.67 velocity ratio from the mid to central left common iliac vein.  Etiology not demonstrated. CTA/CTV could be performed for further  evaluation.     2. Central left common iliac vein patent with a phasic waveform.      3. Peripheral left common iliac vein and central through mid left  external iliac vein obscured.     4. Non occlusive deep venous thrombosis or chronic venous changes in  the left external iliac vein. Central extent not visualized in this  study.     I have personally reviewed the examination and initial interpretation  and I agree with the findings.     ALEIDA HUTCHINS MD     Assessment   65 yo w/ MS who presents with bilateral lower extremity edema, much worse on left. She had a venous competency ultrasound 6/24/24 which is suggestive of chronic thrombus venous changes throughout most of the left lower extremity deep veins. Duplex evaluation of the iliac veins did demonstrate an increased velocity ratio in the iliac vein which can suggest venous narrowing which can be seen " on CT in 2022 as well, though it could be a normal variant. She does not have any clot in the left iliac veins. As far as history, these symptoms are quite chronic, lasting at least 6 months. 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 Before jumping to venography and interventions like stenting I would recommend conservative management with anticoagulation and medical grade compression stockings. She has already started eliquis. She has an appointment with the [facility] clinic which I recommended that she keep so that they can manage her anticoagulation and evaulate for the etiology of her DVT.    Plan   - Prescription for medical grade thigh high compression stockings  - Continue anticoagulation. Patient has appointment with Hematology 8/15/24, will defer management of anticoagulation to them with workup for pro thrombotic disorder.  - Follow up visit in 3 months with Bilateral LE venous Duplex. At that time depending on severity of symptoms can consider whether or not to proceed with LLE venography to evaluate for May-Thurner.      Kermit Loomis MD  Integrated IR/DR Resident PGY-6    Patient was seen with interventional radiology fellow/medical student.      CC  Patient Care Team:  [facility] as PCP - General  Denisse Mata MD as MD (Neurology)  Barrera Shannon MD as MD (Neurology)  Karen Hunter NP as Assigned Neuroscience Provider  Lionel Owen MD as Assigned PCP                    Again, thank you for allowing me to participate in the care of your patient.      Sincerely,    Elías Rivera MD

## 2024-07-11 NOTE — PATIENT INSTRUCTIONS
INTERVENTIONAL RADIOLOGY (IR) CLINIC   AFTER VISIT SUMMARY (AVS)  [facility]  [address], [place], [address]  [phone] (IR Nurse Triage Line)        Dear Colletta Dwen Sorrell    You have been prescribed compression stockings (20-30 mmHg knee high/thigh high stockings) that are medical grade.   These can be obtained at:              [facility]              [address], [place] [address]              [phone]    [facility]              Medical supply              [address], [place]               [phone]  2. Wear your compression stockings during the day to help with symptoms. You do not need to wear the compression stockings at night.   3. Please work on walking at least 20 minutes per day.   4. Elevate your legs for at least an hour each day  5. Please follow up on the [facility]  6. We will also schedule for you to return in 3 months with an Ultrasound and a clinic visit again.              The team there will reach out to you to arrange a visit.  If your symptoms improve and you do not desire treatment, please cancel the appointment.       Sincerely,  Jazmyn Lobo RN        For more information please visit the American Venous Forum:  https://www.venousforum.org/patients/[email or web address]-[email or web address]    If questions or concerns please call [phone]

## 2024-07-24 ENCOUNTER — TRANSFERRED RECORDS (OUTPATIENT)
Dept: HEALTH INFORMATION MANAGEMENT | Facility: CLINIC | Age: 64
End: 2024-07-24
Payer: COMMERCIAL

## 2024-07-24 ENCOUNTER — MEDICAL CORRESPONDENCE (OUTPATIENT)
Dept: HEALTH INFORMATION MANAGEMENT | Facility: CLINIC | Age: 64
End: 2024-07-24
Payer: COMMERCIAL

## 2024-07-25 ENCOUNTER — TELEPHONE (OUTPATIENT)
Dept: VASCULAR SURGERY | Facility: CLINIC | Age: 64
End: 2024-07-25
Payer: COMMERCIAL

## 2024-07-26 NOTE — TELEPHONE ENCOUNTER
Sent [facility] [employer] (1st Attempt) for the patient to call back and schedule the following:Return may thurner     Location: [facility]  Provider:   Appointment type: Return Vas Pt  Appointment mode: In person or Virtual  Return date: October 2024  Specialty phone number: [phone] or  [phone]      Is Imaging Needed: US lower venous  Imaging Phone Number: [phone]    Additional Notes: Please schedule imaging prior to Provider visit.    Rajinder Castellanos on 7/25/2024 at 7:13 PM

## 2024-08-01 ENCOUNTER — TELEPHONE (OUTPATIENT)
Dept: OTHER | Facility: CLINIC | Age: 64
End: 2024-08-01
Payer: COMMERCIAL

## 2024-08-01 DIAGNOSIS — I82.4Z2 LOWER LEG DVT (DEEP VENOUS THROMBOEMBOLISM), ACUTE, LEFT (H): Primary | ICD-10-CM

## 2024-08-01 NOTE — TELEPHONE ENCOUNTER
Forms/Letter Request    Type of form/letter: Medication Hold    Do we have the form/letter: YES- Placed on provider's desk    Who is the form from? [facility]     Where did/will the form come from? form was faxed in    How would you like the form/letter returned: Fax : [phone]

## 2024-08-01 NOTE — TELEPHONE ENCOUNTER
RN called patient and relayed the message as written from provider below. Patient verbalized understanding and denies further questions or concerns at this time.     Anaid Alva, RN, BSN, PHN  [facility]  Nurse Triage, [facility]

## 2024-08-01 NOTE — TELEPHONE ENCOUNTER
RN spoke with patient. RN was trying to relay provider message about anticoagulation refill request she starting speaking over RN.     Pt states that she already saw a specialist at [place] for follow-up from her DVT on 6/27.   She states that she was advise to continue anticoagulation medication.     She declines any appointment with PCP office.  She states that provider just needs to look at the notes in her chart and prescribe medication.     Pt then was upset and disconnected the call.       Provider, RN reviewed 7/11 Vasc/Surg consult with Dr. Rivera.  See below plan.     Plan   - Prescription for medical grade thigh high compression stockings  - Continue anticoagulation. Patient has appointment with Hematology 8/15/24, will defer management of anticoagulation to them with workup for pro thrombotic disorder.  - Follow up visit in 3 months with Bilateral LE venous Duplex. At that time depending on severity of symptoms can consider whether or not to proceed with LLE venography to evaluate for May-Thurner.      How would provider like to proceed?      Tess Grimm RN    [facility]

## 2024-08-01 NOTE — TELEPHONE ENCOUNTER
Please call patient and explained that I did send a 30-day prescription to the [place] pharmacy as requested however since this is a blood thinner and the specialist has to determine how long she is going to be on that  I see that she has an appointment for the hematology on 15 August  Further prescriptions have to be taken over by them    Please call and convey the above

## 2024-08-01 NOTE — TELEPHONE ENCOUNTER
Please triage this call  I never prescribed this medication for the patient  I am not sure who prescribed it for her  She needs to get it from her original prescriber  Looks like this was started for this patient on 27 June  I saw this patient once in May  She needs to contact her original prescriber for this medication but if she cannot contact them and she wants this prescription from me then I am happy to send a 30-day prescription but she  needs to make an appointment with me for further prescriptions as we need to determine the duration of anticoagulation

## 2024-08-01 NOTE — TELEPHONE ENCOUNTER
Medication Question or Refill        What medication are you calling about (include dose and sig)?: apixaban ANTICOAGULANT (ELIQUIS) 5 MG tablet     Preferred Pharmacy:    [facility] - [place], [place] - [address]   [address] [facility] [address]  Phone: [phone] Fax: [phone]        Controlled Substance Agreement on file:   CSA -- Patient Level:    CSA: None found at the patient level.       Who prescribed the medication?: Jay Lerma PA-C     Do you need a refill? Yes    Patient offered an appointment? No    Do you have any questions or concerns?  Yes: Patient states she only has three doses of medication left.      Could we send this information to you in [place] or would you prefer to receive a phone call?:   Patient would prefer a phone call   Okay to leave a detailed message?: Yes at Cell number on file:    Telephone Information:   Mobile [phone]

## 2024-08-05 ENCOUNTER — INFUSION THERAPY VISIT (OUTPATIENT)
Dept: INFUSION THERAPY | Facility: CLINIC | Age: 64
End: 2024-08-05
Attending: PSYCHIATRY & NEUROLOGY
Payer: COMMERCIAL

## 2024-08-05 VITALS
SYSTOLIC BLOOD PRESSURE: 123 MMHG | WEIGHT: 160.9 LBS | RESPIRATION RATE: 16 BRPM | DIASTOLIC BLOOD PRESSURE: 77 MMHG | BODY MASS INDEX: 26.78 KG/M2 | HEART RATE: 64 BPM | TEMPERATURE: 98.1 F | OXYGEN SATURATION: 99 %

## 2024-08-05 DIAGNOSIS — G35 MULTIPLE SCLEROSIS (H): Primary | ICD-10-CM

## 2024-08-05 PROCEDURE — 96365 THER/PROPH/DIAG IV INF INIT: CPT

## 2024-08-05 PROCEDURE — 258N000003 HC RX IP 258 OP 636: Performed by: PSYCHIATRY & NEUROLOGY

## 2024-08-05 PROCEDURE — 96366 THER/PROPH/DIAG IV INF ADDON: CPT

## 2024-08-05 PROCEDURE — 250N000013 HC RX MED GY IP 250 OP 250 PS 637: Performed by: PSYCHIATRY & NEUROLOGY

## 2024-08-05 PROCEDURE — 96376 TX/PRO/DX INJ SAME DRUG ADON: CPT

## 2024-08-05 PROCEDURE — 99207 PR NO CHARGE LOS: CPT

## 2024-08-05 PROCEDURE — 96375 TX/PRO/DX INJ NEW DRUG ADDON: CPT

## 2024-08-05 PROCEDURE — 250N000011 HC RX IP 250 OP 636: Performed by: PSYCHIATRY & NEUROLOGY

## 2024-08-05 RX ORDER — EPINEPHRINE 1 MG/ML
0.3 INJECTION, SOLUTION INTRAMUSCULAR; SUBCUTANEOUS EVERY 5 MIN PRN
Status: CANCELLED | OUTPATIENT
Start: 2024-08-05

## 2024-08-05 RX ORDER — ALBUTEROL SULFATE 0.83 MG/ML
2.5 SOLUTION RESPIRATORY (INHALATION)
Status: CANCELLED | OUTPATIENT
Start: 2024-08-05

## 2024-08-05 RX ORDER — DIPHENHYDRAMINE HYDROCHLORIDE 50 MG/ML
50 INJECTION INTRAMUSCULAR; INTRAVENOUS
Status: CANCELLED
Start: 2024-08-05

## 2024-08-05 RX ORDER — ACETAMINOPHEN 325 MG/1
650 TABLET ORAL ONCE
Status: COMPLETED | OUTPATIENT
Start: 2024-08-05 | End: 2024-08-05

## 2024-08-05 RX ORDER — DIPHENHYDRAMINE HCL 25 MG
50 CAPSULE ORAL ONCE
Status: CANCELLED | OUTPATIENT
Start: 2024-08-05

## 2024-08-05 RX ORDER — ALBUTEROL SULFATE 90 UG/1
1-2 AEROSOL, METERED RESPIRATORY (INHALATION)
Start: 2024-08-05

## 2024-08-05 RX ORDER — ALBUTEROL SULFATE 90 UG/1
1-2 AEROSOL, METERED RESPIRATORY (INHALATION)
Status: CANCELLED
Start: 2024-08-05

## 2024-08-05 RX ORDER — METHYLPREDNISOLONE SODIUM SUCCINATE 125 MG/2ML
125 INJECTION, POWDER, LYOPHILIZED, FOR SOLUTION INTRAMUSCULAR; INTRAVENOUS ONCE
Status: CANCELLED | OUTPATIENT
Start: 2024-08-05

## 2024-08-05 RX ORDER — HEPARIN SODIUM (PORCINE) LOCK FLUSH IV SOLN 100 UNIT/ML 100 UNIT/ML
5 SOLUTION INTRAVENOUS
OUTPATIENT
Start: 2024-08-05

## 2024-08-05 RX ORDER — MEPERIDINE HYDROCHLORIDE 25 MG/ML
25 INJECTION INTRAMUSCULAR; INTRAVENOUS; SUBCUTANEOUS EVERY 30 MIN PRN
Status: CANCELLED | OUTPATIENT
Start: 2024-08-05

## 2024-08-05 RX ORDER — METHYLPREDNISOLONE SODIUM SUCCINATE 125 MG/2ML
125 INJECTION, POWDER, LYOPHILIZED, FOR SOLUTION INTRAMUSCULAR; INTRAVENOUS
Status: CANCELLED
Start: 2024-08-05

## 2024-08-05 RX ORDER — HEPARIN SODIUM,PORCINE 10 UNIT/ML
5-20 VIAL (ML) INTRAVENOUS DAILY PRN
Status: CANCELLED | OUTPATIENT
Start: 2024-08-05

## 2024-08-05 RX ORDER — METHYLPREDNISOLONE SODIUM SUCCINATE 125 MG/2ML
125 INJECTION, POWDER, LYOPHILIZED, FOR SOLUTION INTRAMUSCULAR; INTRAVENOUS ONCE
Status: COMPLETED | OUTPATIENT
Start: 2024-08-05 | End: 2024-08-05

## 2024-08-05 RX ORDER — METHYLPREDNISOLONE SODIUM SUCCINATE 125 MG/2ML
125 INJECTION, POWDER, LYOPHILIZED, FOR SOLUTION INTRAMUSCULAR; INTRAVENOUS
Status: DISCONTINUED | OUTPATIENT
Start: 2024-08-05 | End: 2024-08-05 | Stop reason: HOSPADM

## 2024-08-05 RX ORDER — HEPARIN SODIUM (PORCINE) LOCK FLUSH IV SOLN 100 UNIT/ML 100 UNIT/ML
5 SOLUTION INTRAVENOUS
Status: CANCELLED | OUTPATIENT
Start: 2024-08-05

## 2024-08-05 RX ORDER — EPINEPHRINE 1 MG/ML
0.3 INJECTION, SOLUTION INTRAMUSCULAR; SUBCUTANEOUS EVERY 5 MIN PRN
OUTPATIENT
Start: 2024-08-05

## 2024-08-05 RX ORDER — ALBUTEROL SULFATE 0.83 MG/ML
2.5 SOLUTION RESPIRATORY (INHALATION)
OUTPATIENT
Start: 2024-08-05

## 2024-08-05 RX ORDER — DIPHENHYDRAMINE HYDROCHLORIDE 50 MG/ML
50 INJECTION INTRAMUSCULAR; INTRAVENOUS
Status: COMPLETED | OUTPATIENT
Start: 2024-08-05 | End: 2024-08-05

## 2024-08-05 RX ORDER — ACETAMINOPHEN 325 MG/1
650 TABLET ORAL ONCE
Status: CANCELLED | OUTPATIENT
Start: 2024-08-05

## 2024-08-05 RX ORDER — HEPARIN SODIUM,PORCINE 10 UNIT/ML
5-20 VIAL (ML) INTRAVENOUS DAILY PRN
OUTPATIENT
Start: 2024-08-05

## 2024-08-05 RX ORDER — MEPERIDINE HYDROCHLORIDE 25 MG/ML
25 INJECTION INTRAMUSCULAR; INTRAVENOUS; SUBCUTANEOUS EVERY 30 MIN PRN
OUTPATIENT
Start: 2024-08-05

## 2024-08-05 RX ORDER — DIPHENHYDRAMINE HCL 25 MG
50 CAPSULE ORAL ONCE
Status: COMPLETED | OUTPATIENT
Start: 2024-08-05 | End: 2024-08-05

## 2024-08-05 RX ADMIN — DIPHENHYDRAMINE HYDROCHLORIDE 50 MG: 25 CAPSULE ORAL at 10:53

## 2024-08-05 RX ADMIN — METHYLPREDNISOLONE SODIUM SUCCINATE 125 MG: 125 INJECTION, POWDER, FOR SOLUTION INTRAMUSCULAR; INTRAVENOUS at 11:26

## 2024-08-05 RX ADMIN — METHYLPREDNISOLONE SODIUM SUCCINATE 125 MG: 125 INJECTION INTRAMUSCULAR; INTRAVENOUS at 12:38

## 2024-08-05 RX ADMIN — SODIUM CHLORIDE 250 ML: 9 INJECTION, SOLUTION INTRAVENOUS at 11:11

## 2024-08-05 RX ADMIN — DIPHENHYDRAMINE HYDROCHLORIDE 50 MG: 50 INJECTION, SOLUTION INTRAMUSCULAR; INTRAVENOUS at 12:33

## 2024-08-05 RX ADMIN — OCRELIZUMAB 600 MG: 300 INJECTION INTRAVENOUS at 11:43

## 2024-08-05 RX ADMIN — FAMOTIDINE 20 MG: 10 INJECTION, SOLUTION INTRAVENOUS at 12:35

## 2024-08-05 RX ADMIN — ACETAMINOPHEN 650 MG: 325 TABLET ORAL at 10:53

## 2024-08-05 ASSESSMENT — PAIN SCALES - GENERAL: PAINLEVEL: WORST PAIN (10)

## 2024-08-05 NOTE — PROGRESS NOTES
Infusion Nursing Note:  Colletta Dicksonmary Hopper presents today for annual maintenance rapid-Ocrevus infusion.    Patient seen by provider today: No   present during visit today: Not Applicable.    Note:   Patient arrived to clinic 30 minutes prior to appointment.  Charge nurse applied EMLA cream to multiple sites on arms upon arrival to clinic.    Patient states her left neck, shoulder, arm pain has been 10 out of 10 recently. Today, patient states pain continues to be a 10. During this time patient is able to talk in complete sentences and use arm to assist with pivot transferring.    Requires 1-2 assist with transferring from bed to wheelchair to toilet.    Ocrevus rates:  100 mL/hr for the first 15 minutes  200 mL/hr for the next 15 minutes  250 mL/hr for the next 30 minutes  300 mL/hr for the remaining 60 minutes.     Intravenous Access:  Peripheral IV placed without difficulty after EMLA for 30 minutes.    Treatment Conditions:  Biological Infusion Checklist:  ~~~ NOTE: If the patient answers yes to any of the questions below, hold the infusion and contact ordering provider or on-call provider.    Have you recently had an elevated temperature, fever, chills, productive cough, coughing for 3 weeks or longer or hemoptysis,  abnormal vital signs, night sweats,  chest pain or have you noticed a decrease in your appetite, unexplained weight loss or fatigue? No  Do you have any open wounds or new incisions? No  Do you have any upcoming hospitalizations or surgeries? Does not include esophagogastroduodenoscopy, colonoscopy, endoscopic retrograde cholangiopancreatography (ERCP), endoscopic ultrasound (EUS), dental procedures or joint aspiration/steroid injections No  Do you currently have any signs of illness or infection or are you on any antibiotics? No  Have you had any new, sudden or worsening abdominal pain? No  Have you or anyone in your household received a live vaccination in the past 4 weeks? Please  note: No live vaccines while on biologic/chemotherapy until 6 months after the last treatment. Patient can receive the flu vaccine (shot only), pneumovax and the Covid vaccine. It is optimal for the patient to get these vaccines mid cycle, but they can be given at any time as long as it is not on the day of the infusion. No  Have you recently been diagnosed with any new nervous system diseases (ie. Multiple sclerosis, Guillain [place], seizures, neurological changes) or cancer diagnosis? Are you on any form of radiation or chemotherapy? No  Have there been any other new onset medical symptoms? Yes, 6/27/24 diagnosed with a DVT. Following with multiple providers regarding this.    Post Infusion Assessment:  Patient tolerated infusion poorly due to : Hypersensitivity: Did patient have a hypersensitivity reaction? : Yes  Drug or Product name: Ocrevus  Were pre-meds administered?: Yes  What pre-meds were administered?: Acetaminophen (Tylenol);Diphenhydramine (Benadryl);Methylprednisolone  First or Subsequent treatment: Subsequent infusion  Rate of infusion when patient had hypersensitivity reaction: 250  Time the hypersensitivity reaction was first recognized: 1230  Symptoms observed or reported (select all that apply): Hives;Flushing;Itching;Other: (Comment) (Cough)  Interventions/treatment following reaction: Infusion stopped;Hypersensitivity medications administered;Reduced rate of medication administration;Additional observation period added  What hypersensitivity medications were administered?: DiphendydrAMINE (benadryl);Methylprednisolone;Famotidine(Pepcid)  Name of provider notified: Dr Samuels  Time provider notified: 1305  Type of notification (select all that apply): Paged/Phone  Was the patient re-challenged today?: Yes - tolerated well  Patient observed for 60 minutes post Ocrevus per protocol.  Blood return noted pre and post infusion.  Site patent and intact, free from redness, edema or discomfort.  No  evidence of extravasations.  Access discontinued per protocol.       Discharge Plan:   AVS to patient via MYCHART.  Patient will return in one year for next appointment.   Patient discharged in stable condition accompanied by: daughter, Kristel, who is patient's personal care attendant.  Departure Mode: Ambulatory with rolling walker.      Pepper Reinoso RN

## 2024-08-05 NOTE — PROGRESS NOTES
1:29 restarted Ocrevus at half the rate when reaction happened. And increased by 40ml/hr every 30 minutes    Ocrevus rates:   125 ml/hr x 30 minutes  165 ml/hr x 30 minutes  205 ml/hr x 30 minutes  245 ml/hr x 30 minutes  285 ml/hr x remainder    Patient tolerated remainder of infusion without complications.    Writer recommends future Ocrevus infusions be administered at the NON-rapid rate.      Pepper Reinoso RN on 8/5/2024 at 5:11 PM

## 2024-08-05 NOTE — PROGRESS NOTES
1230: Writer heard patient coughing from across the infusion room. Writer entered room. Patient states she's had itching for the past 5 minutes on scalp, face, chest and back. Daughter states she encouraged patient to push call light sooner but patient did not. Patient actively coughing and scratching upper body. Face, chest and back pink with hives. 119 ml of 500 ml had been administered. Patient was 10 minutes into the 250 ml/hr rate.    Stopped Ocrevus. Started NS mainline.  1233 Administered Benadryl IV  1235 Administered Pepcid IV  1238 Solumedrol IV.    With each rescue medication administration patient stated she was improving and VS remained stable. However, cough, itching, flushing and hives remained for at least 30 minutes after stopping Ocrevus.    Onsite paramedic at bedside to monitor patient.    1300 Paged Dr Shannon and notified of patient's acute situation.  Dr Shannon states:  - Monitor patient for another 15-20 minutes.  - If symptoms resolve, restart Ocrevus at a slower rate.  - If symptoms don't resolve, do not rechallenge and discharge patient to home (as the cough/itching/flushing will most likely go away within an hour.)

## 2024-08-06 NOTE — TELEPHONE ENCOUNTER
I have never seen this patient - last saw Dr. Owen in person in May- routing to him to review and address

## 2024-08-12 NOTE — PROGRESS NOTES
[facility]  [address] [place] [id number]  Phone: [phone], Fax: [phone]    Outpatient Visit Note:    Patient: Colletta Dwen Sorrell  MRN: [id number]  : 1960  FIOR: Aug 15, 2024    Reason for Consultation:  Colletta Dwen Sorrell is referred by Dr. Rivera for evaluation and treatment of venous thromboembolism.    Assessment:  In summary, Colletta Dwen Sorrell is a 64 year old female with past medical history significant for multiple sclerosis who was recently found to have chronic appearing venous thrombosis of the left lower extremity and possible left iliac stenosis without anisa May-Thurner Syndrome. Her venous thromboembolism is new since 2017 when she last had negative lower extremity imaging. She notes progressive left lower extremity swelling since 2019. Her symptoms have progressively worsened over the last two years and her edema has been refractory to lymphedema therapies which prompted her lower extremity imaging that revealed chronic thromboembolic changes. She denies any obvious triggers that caused her worsening symptoms. She denies any surgeries, hospitalizations, trauma. She does have chronic risk factors of MS with decline in functional status. Dr. Rivera did not feel that she would benefit from left iliac vein stenting at present. She is currently on Eliquis 5mg twice daily and tolerating it well. She has been wearing daily compression. She has no other known history of venous thromboembolism nor family history of venous thromboembolism. She has not had APS testing.     Plan:  Majority of today's visit was spent counseling the patient regarding provoked vs unprovoked VTE including pathophysiology, risk factors, anticoagulation options, risks/benefits and duration of therapy.  On review of history and imaging, her thrombus seems to be chronic in nature which makes it difficult to define if this was a thrombus that was provoked or not. She  does have chronic risk factors including possible iliac vein stenosis, reduced activity level, and advancing age. Recommend consideration of long term anticoagulation.   Discussed recommendation for anticoagulation and medication options. She is a good candidate to stay on Eliquis 5mg twice daily. She should call if she is having any bleeding issues. She is tolerating this very well at present. Discussed cessation of tumeric to reduce risk of bleeding.  Reviewed conservative treatment approach for management of post thrombotic syndrome symptoms with compression, elevation, walking or pedaling program.   Follow up with Dr. Rivera after repeat imaging in three months time.   Recommend laboratory evaluation of antiphospholipid antibody syndrome (cannot do LAC on DOAC) to ensure she is a Eliquis candidate. Deferred inheritable thrombophilia evaluation as she has no personal family history of venous thromboembolism and as the outcome is unlikely to change her anticoagulation recommendations.   For upcoming Ispine injections, it is reasonable to hold Eliquis x 72 hours prior to injections and resume within 12-24 hours after. Faxed letter to [facility] so she can be scheduled.   Travel letter for medications provided. Discussed venous thromboembolism precautions for travel.   Follow up in 6 months. Discuss reduction to prophylactic intensity anticoagulation at that time.       The patient is given our center's contact information and is instructed to call if they should have any further questions or concerns.  Otherwise, we will plan on seeing them back in 6 months.      Patient understands and agrees with the above plan and recommendation.      Jaimee Lobo, MPH, PA-C  [facility]    60 minutes spent by me on the date of the encounter doing chart review, review of outside records, review of test results, interpretation of tests, patient visit, and documentation      ----------------------------------------------------------------------------------------------------------------------  History of Present Illness:  Colletta Dwen Sorrell is a 64 year old female with past medical history significant for multiple sclerosis (Dx 2013), bilateral lower extremity lymphemema, arthritis. She presents today for further evaluation and management of extensive left lower extremity chronic appearing deep vein thrombi.     Colletta has struggled with bilateral lower extremity edema since 2019. Her lymphedema has been felt to be 2/2 MS. As she was not responding to wrapping and pump therapy, she was seen in 5/2024 to [facility]. Ultrasound showed left iliac stenosis consistent with possible May Thurner syndrome and chronic thrombus in the left common femoral vein, femoral vein, popliteal vein and calf vein. She was not advised to start any anticoagulation. She was recommended to follow up with vascular for possible venoplasty and stenting. The patient elected to transition her care to [facility].     She had an outpatient duplex ultrasound 6/24/2024 that showed extensive nonocclusive DVT vs chronic venous changes of the left femoral vein, popliteal and peroneal veins of the left lower extremity. There was also nonocclusive DVT or chronic venous changes of the left external iliac vein that was seen on IVC/iliac US. CT was pursued and showed diffusely small caliber deep veins of the left lower extremity and pelvis with diffusely diminished enhancement and probable thrombus of the left femoral vein. She was started on Eliquis in the emergency department and recommended to follow up with our team as well as vascular.     She did meet with Dr. Rivera on 7/11/2024 in consultation. He reviewed her venous competency US that showed chronic venous changes throughout her left lower extremity. There was an increased velocity of the ration in the iliac vein suggestive of possible narrowing or normal  variant. He recommended anticoagulation, elevation and compression for management of post thrombotic syndrome symptoms. He plans on repeat bilateral duplex lower extremity ultrasound in October 2024 and will revisit with her after that to determine need to pursue LLE venography.     Per review of available imaging, her thrombi are new at least since 5/2017 where she had bilateral lower extremity imaging that was negative for DVT bilaterally. She notes that it wasn't until 2022 or 2023 when her symptoms began progressing although she denies any anisa triggers or drastic changes to her mobility status at that time.      She is a never smoker. She notes no family history of venous thromboembolism. She has tolerated pregnancies, exogenous estrogen therapy and surgeries without venous thromboembolism complications. No history of CAD, CVA, or miscarriage. No other known autoimmune conditions apart from MS. She has not had any antiphospholipid antibody syndrome testing.     She is currently tolerating Eliquis well without any bleeding issues. She notes that she is on quite a few supplements as well. Did discuss that tumeric can increased bruising and bleeding. She notes that her leg is currently at her baseline. She is wearing daily compression and elevating the leg as best she can. She uses walker to ambulate. Denies any recent falls. She does have a pedal bike at home. She notes fatigue is her biggest barrier to activities.     She does have an upcoming cervical spinal and shoulder injection at [facility]. They are requesting 72 hour hold of her anticoagulation. Given that her thrombus seems chronic, this is reasonable to schedule.     Past Medical History:  Past Medical History:   Diagnosis Date    Cubital tunnel syndrome     Meralgia paraesthetica     MS (multiple sclerosis) (H)     Multinodular thyroid     Neuropathy        Past Surgical History:  Past Surgical History:   Procedure Laterality Date    INJECT EPIDURAL  CERVICAL Left 2/26/2024    Procedure: C7-T1 interlaminar epidural steroid injection;  Surgeon: Dorothy Frank MD;  [place] [facility] [place]       Medications:  Current Outpatient Medications   Medication Sig Dispense Refill    Alpha-Lipoic Acid 300 MG CAPS TAKE ONE CAPSULE BY MOUTH EVERY DAY 30 capsule 11    amantadine (SYMMETREL) 100 MG capsule Take 1 capsule (100 mg) by mouth 2 times daily 60 capsule 11    apixaban ANTICOAGULANT (ELIQUIS) 5 MG tablet Take 1 tablet (5 mg) by mouth 2 times daily 180 tablet 3    Ascorbic Acid (VITAMIN C PO) Take 1.5 teaspoonful by mouth daily       B Complex Vitamins (VITAMIN B COMPLEX PO) Take 1 tablet by mouth as needed       benzonatate (TESSALON) 100 MG capsule Take 1-2 capsules by mouth up to 3 times daily as needed for cough. 30 capsule 0    CALCIUM-VITAMIN D PO Take 1 tablet by mouth daily Vit d 7,0000 unit      cholecalciferol (VITAMIN D3) 5000 units (125 mcg) capsule TAKE ONE CAPSULE BY MOUTH EVERY DAY 30 capsule 11    COMPRESSION STOCKINGS Please measure and distribute 2 pair of 20mmHg - 30mmHg THIGH  high open or closed toe compression stockings with extra refills as indicated or what insurance will allow . 2 each 3    Cyanocobalamin (VITAMIN B 12 PO) Take 2,500 mcg by mouth daily      Green Tea, Radha sinensis, (GREEN TEA PO) Liquid form      HEMP OIL OR EXTRACT OR OTHER CBD CANNABINOID, NOT MEDICAL CANNABIS,       Incontinence Supply Disposable (DEPEND FIT-FLEX-WOMEN-M) MISC USE 1-2 PER DAY 36 each 3    Lactobacillus (ACIDOPHILUS) CAPS TAKE ONE CAPSULE BY MOUTH ONCE DAILY 30 capsule 10    MAGNESIUM OXIDE PO Take 500 mg by mouth daily      melatonin 3 MG tablet Take 3 mg by mouth nightly as needed      methylPREDNISolone (MEDROL DOSEPAK) 4 MG tablet therapy pack Follow package instructions 21 tablet 0    Multiple Vitamins-Minerals (MULTIVITAMIN ADULT) TABS Take 1 tablet by mouth daily 30 tablet 11    Naltrexone POWD Take 4.5 mg by mouth At Bedtime 1 Bottle 3    Ocrelizumab  "(OCREVUS IV)       Omega-3 Fatty Acids (FISH OIL) 1200 MG capsule Take 1,200 mg by mouth daily      oxyBUTYnin ER (DITROPAN XL) 15 MG 24 hr tablet TAKE ONE TABLET BY MOUTH ONCE DAILY 30 tablet 11    Probiotic Product (ACIDOPHILUS PROBIOTIC BLEND) CAPS TAKE ONE CAPSULE BY MOUTH EVERY DAY 30 capsule 11    TURMERIC PO       Vitamin A 56516 units CAPS           Allergies:  Allergies   Allergen Reactions    Compazine [Prochlorperazine] Swelling     Swelling of tongue about 25 years ago       ROS:  Remainder of a comprehensive 14 point ROS is negative unless noted above.    Social History:  Patient works in community involvement  Denies any tobacco use. No significant alcohol use. Denies any illicit drug use.     Family History:  Denies any family history of bleeding or clotting disorders     Objectives:  Vitals: /71   Pulse 64   Temp 97.2  F (36.2  C) (Oral)   Ht 1.626 m (5' 4\")   Wt 72.6 kg (160 lb)   SpO2 96%   BMI 27.46 kg/m     Exam:   Constitutional: Appears well, no distress  HEENT: Pupils equal and round. No scleral icterus or hemorrhage.   Respiratory: no increased work of breathing.   Mus/Skele: L > R lower extremity edema, wearing compression hose. No calf tenderness, neg homans bilaterally.   Skin: no petechiae, no ecchymosis on exposed dermis.  Neuro:  AOx3      Labs:  CBC RESULTS:   Recent Labs   Lab Test 06/27/24  1745   WBC 4.9   RBC 4.78   HGB 13.2   HCT 40.3   MCV 84   MCH 27.6   MCHC 32.8   RDW 15.2*        Last Comprehensive Metabolic Panel:  Sodium   Date Value Ref Range Status   06/27/2024 139 135 - 145 mmol/L Final   08/14/2019 143 133 - 144 mmol/L Final     Potassium   Date Value Ref Range Status   06/27/2024 4.0 3.4 - 5.3 mmol/L Final   08/14/2019 3.9 3.4 - 5.3 mmol/L Final     Chloride   Date Value Ref Range Status   06/27/2024 103 98 - 107 mmol/L Final   08/14/2019 110 (H) 94 - 109 mmol/L Final     Carbon Dioxide   Date Value Ref Range Status   08/14/2019 28 20 - 32 mmol/L " Final     Carbon Dioxide (CO2)   Date Value Ref Range Status   06/27/2024 26 22 - 29 mmol/L Final     Anion Gap   Date Value Ref Range Status   06/27/2024 10 7 - 15 mmol/L Final   08/14/2019 5 3 - 14 mmol/L Final     Glucose   Date Value Ref Range Status   06/27/2024 85 70 - 99 mg/dL Final   08/14/2019 86 70 - 99 mg/dL Final     Comment:     Non Fasting     Urea Nitrogen   Date Value Ref Range Status   06/27/2024 9.8 8.0 - 23.0 mg/dL Final   08/14/2019 6 (L) 7 - 30 mg/dL Final     Creatinine   Date Value Ref Range Status   06/27/2024 0.66 0.51 - 0.95 mg/dL Final   08/14/2019 0.70 0.52 - 1.04 mg/dL Final     GFR Estimate   Date Value Ref Range Status   06/27/2024 >90 >60 mL/min/1.73m2 Final     Comment:     eGFR calculated using 2021 CKD-EPI equation.   08/14/2019 >90 >60 mL/min/[1.73_m2] Final     Comment:     Non  GFR Calc  Starting 12/18/2018, serum creatinine based estimated GFR (eGFR) will be   calculated using the Chronic Kidney Disease Epidemiology Collaboration   (CKD-EPI) equation.       Calcium   Date Value Ref Range Status   06/27/2024 9.5 8.8 - 10.2 mg/dL Final   08/14/2019 9.0 8.5 - 10.1 mg/dL Final     Liver Function Studies -   Recent Labs   Lab Test 06/27/24  1745   PROTTOTAL 7.6   ALBUMIN 4.6   BILITOTAL 0.4   ALKPHOS 74   AST 27   ALT 22         Imaging:         Results for orders placed or performed during the hospital encounter of 06/27/24   CT Femur Thigh Left with Contrast     Status: None     Narrative     EXAM: CT TIBIA FIBULA LOWER LEG LEFT W CONTRAST, CT FEMUR THIGH LEFT WITH CONTRAST  LOCATION: [facility]  DATE: 6/27/2024     INDICATION: Left lower extremity swelling. DVT.  COMPARISON: Ultrasound in 6/24/2024  TECHNIQUE: IV contrast. Axial, sagittal and coronal thin-section reconstruction. Dose reduction techniques were used.   CONTRAST: 90mL Isovue 370     FINDINGS:   Diffuse subcutaneous soft tissue stranding involving the left  lower leg most marked distally, and extending into the distal thigh. No fluid collection. No significant deeper soft tissue abnormality. There is atrophy of the distal medial gastrocnemius   muscle.     Most of the deep veins of the left lower extremity and in the left pelvis. The left external iliac vein is markedly attenuated. There is diffuse diminished enhancement of the left lower extremity deep veins, and likely filling defect within the small   caliber left femoral vein. There are venous varicosities are collateral within the left pelvis.     No bony abnormality. 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        Impression     IMPRESSION:  1.  Diffusely small caliber deep veins left lower extremity and left pelvis, with diffusely diminished enhancement and likely thrombus within the left femoral vein, as demonstrated on the recent ultrasound.  2.  Subcutaneous soft tissue stranding in the left lower leg and distal thigh may represent edema or cellulitis.         Narrative & Impression   ULTRASOUND INFERIOR VENA CAVA LEFT EXTERNAL ILIAC VENOUS DUPLEX  6/24/2024 8:21 AM     CLINICAL HISTORY: left iliac ultrasound to look for left ilac vein  compression;external assessment notes possible may thurner;  May-Thurner syndrome; Left leg swelling.      COMPARISONS: Ultrasound lower extremity duplex venous left 6/24/2024     REFERRING PROVIDER: BELEN FUNES     TECHNIQUE: Inferior vena cava through left external iliac vein  evaluated with grayscale, color Doppler, and spectral pulsed wave  Doppler ultrasound. Right common iliac vein evaluated for symmetry.     FINDINGS:   Inferior vena cava, peripheral: 92 cm/s, phasic     Right common iliac vein: 42 cm/s, phasic     LEFT:  Common iliac vein, central: 33 cm/s, phasic, 3.67 velocity ratio   Common iliac vein, mid: 9 cm/s, phasic  Common iliac vein, peripheral: obscured     External iliac vein, central:  obscured  External iliac vein, mid: obscured  External iliac vein, peripheral: 6 cm/s, flattened, partially  compressible.                                                                      IMPRESSION:  1. 3.67 velocity ratio from the mid to central left common iliac vein.  Etiology not demonstrated. CTA/CTV could be performed for further  evaluation.     2. Central left common iliac vein patent with a phasic waveform.      3. Peripheral left common iliac vein and central through mid left  external iliac vein obscured.     4. Non occlusive deep venous thrombosis or chronic venous changes in  the left external iliac vein. Central extent not visualized in this  study.     I have personally reviewed the examination and initial interpretation  and I agree with the findings.     ALEIDA HUTCHINS MD

## 2024-08-15 ENCOUNTER — OFFICE VISIT (OUTPATIENT)
Dept: HEMATOLOGY | Facility: CLINIC | Age: 64
End: 2024-08-15
Attending: NURSE PRACTITIONER
Payer: COMMERCIAL

## 2024-08-15 VITALS
HEIGHT: 64 IN | DIASTOLIC BLOOD PRESSURE: 71 MMHG | BODY MASS INDEX: 27.31 KG/M2 | OXYGEN SATURATION: 96 % | HEART RATE: 64 BPM | WEIGHT: 160 LBS | SYSTOLIC BLOOD PRESSURE: 112 MMHG | TEMPERATURE: 97.2 F

## 2024-08-15 DIAGNOSIS — I82.4Z2 LOWER LEG DVT (DEEP VENOUS THROMBOEMBOLISM), ACUTE, LEFT (H): Primary | ICD-10-CM

## 2024-08-15 DIAGNOSIS — G35 MULTIPLE SCLEROSIS (H): ICD-10-CM

## 2024-08-15 DIAGNOSIS — I87.1 ILIAC VEIN STENOSIS, LEFT: ICD-10-CM

## 2024-08-15 DIAGNOSIS — Z71.89 ENCOUNTER FOR ANTICOAGULATION DISCUSSION AND COUNSELING: ICD-10-CM

## 2024-08-15 PROCEDURE — 99213 OFFICE O/P EST LOW 20 MIN: CPT | Performed by: PHYSICIAN ASSISTANT

## 2024-08-15 PROCEDURE — 99205 OFFICE O/P NEW HI 60 MIN: CPT | Performed by: PHYSICIAN ASSISTANT

## 2024-08-15 NOTE — LETTER
[facility]  [facility]  [facility] [address], [address], [place] [address]  Main: [phone], Fax: [phone]         Re: Colletta Dwen Sorrell   MRN [id number]   : 1960       Dear JENNIE Staff,    I saw our mutual patient Colletta Dwen Sorrell on 08/15/24 for evaluation and management of venous thromboembolism. It is acceptable for Colletta to hold her Eliquis for 72 hours prior to her upcoming injection. She should resume Eliquis within 12-24 hours after the procedure unless there are bleeding complications.     Please reach out if you have any questions or concerns.     Jaimee Baum, MPH, PA-C  [facility]

## 2024-08-15 NOTE — LETTER
[facility] [phone]    Regarding: Colletta Dwen Sorrell  YOB: 1960  Date: August 15, 2024      To Whom It May Concern:     Colletta Dwen Sorrell has been instructed to carry this letter with him when traveling, and to exhibit to appropriate medical or governmental authorities as needed.    Colletta Dwen Sorrell is followed at [facility] by the [facility].  She has a history of venous thromboembolism.     This letter authorizes him to have in his possession the necessary supplies and medications needed to prevent a blood clot episode.     Should specific questions arise, please feel free to contact us at [phone] or [phone], ext. [phone]     Sincerely,     Jaimee Lobo, MPH, PA-C  [facility]   [facility]

## 2024-08-15 NOTE — LETTER
[facility] [phone]    Regarding: Colletta Dwen Sorrell  YOB: 1960  Date: August 15, 2024      To Whom It May Concern:     Colletta Dwen Sorrell has been instructed to carry this letter with him when traveling, and to exhibit to appropriate medical or governmental authorities as needed.    Colletta Dwen Sorrell is followed at [facility] by the [facility].  She has a history of venous thromboembolism.     This letter authorizes her to have in his possession the necessary supplies and medications needed to prevent a blood clot episode.     Should specific questions arise, please feel free to contact us at [phone] or [phone], ext. [phone]     Sincerely,     Jaimee Lobo, MPH, PA-C  [facility]   [facility]

## 2024-08-15 NOTE — PATIENT INSTRUCTIONS
[facility]  [facility]  [facility] [address], [place] [address]  Main: [phone], Fax: [phone]    Colletta,  It was a pleasure seeing you today.  Thank you for allowing us to be involved in your care.  Please let us know if there is anything else we can do for you, so that we can be sure you are leaving completely satisfied with your care experience.    Labs today.  Our lab is down the hallway in our clinic space.  We will communicate these to you by [employer]. With your permission we may leave a detailed voicemail, please see below for our contact information should you have any questions about your results. Also, please note that some lab results may take a week or so to get back. Feel free to call or message if you have not heard back from us within 7-10 days.    For your upcoming procedure/surgery, please hold Eliquis for 72 hours prior to your spinal and shoulder injection and restart it 12-24 hours after with surgeon approval.    Please stay on your blood thinner:  Eliquis 5mg twice daily.  Please call us with any bleeding issues that you may have.    We would like you to repeat your imaging in 3 months.   This will be arranged through Dr. Rivera's team.     Wear compression stockings daily. . Take them off while you are sleeping. You may need to get new stockings every 3-6 months with regular wear. Please do your bike exercise and try to walk as much as you can. Elevate the legs if you have swelling.     Patient Education & Resources:  For additional information, please see the following web links:  www.stoptheclot.org, www.clotconnect.org.    Call the [facility]  at [phone].     -If surgeries or procedures are planned (for holding instructions).     -If off anticoagulation, please call during high risk times (long-distance travel, broken bones or trauma, immobilization, surgery, pregnancy, or taking  estrogen).     -Any new symptoms of DVT (deep vein thrombosis) or PE (pulmonary embolism)    -pain     -swelling     -redness    -warmth    -shortness of breath    -chest pain    -coughing up blood    We would like a provider on our team to see you at least annually for optimal care and to allow us to continue to prescribe for you.     Return to clinic in in six months.    Your assigned nurse clinician is Malcom. His direct line is [phone].  If they are unavailable and you have immediate concerns, please call [phone] and ask for a nurse.         Jaimee Lobo, MPH, PA-C  [facility]

## 2024-08-27 ENCOUNTER — OFFICE VISIT (OUTPATIENT)
Dept: NEUROLOGY | Facility: CLINIC | Age: 64
End: 2024-08-27
Attending: PSYCHIATRY & NEUROLOGY
Payer: COMMERCIAL

## 2024-08-27 VITALS
OXYGEN SATURATION: 100 % | DIASTOLIC BLOOD PRESSURE: 80 MMHG | HEIGHT: 63 IN | HEART RATE: 61 BPM | WEIGHT: 160 LBS | BODY MASS INDEX: 28.35 KG/M2 | SYSTOLIC BLOOD PRESSURE: 123 MMHG

## 2024-08-27 DIAGNOSIS — G35 MULTIPLE SCLEROSIS (H): ICD-10-CM

## 2024-08-27 DIAGNOSIS — Z91.81 RISK FOR FALLS: Primary | ICD-10-CM

## 2024-08-27 PROCEDURE — 99215 OFFICE O/P EST HI 40 MIN: CPT | Performed by: PSYCHIATRY & NEUROLOGY

## 2024-08-27 PROCEDURE — G2211 COMPLEX E/M VISIT ADD ON: HCPCS | Performed by: PSYCHIATRY & NEUROLOGY

## 2024-08-27 PROCEDURE — 99214 OFFICE O/P EST MOD 30 MIN: CPT | Performed by: PSYCHIATRY & NEUROLOGY

## 2024-08-27 RX ORDER — OXYBUTYNIN CHLORIDE 10 MG/1
20 TABLET, EXTENDED RELEASE ORAL DAILY
Qty: 180 TABLET | Refills: 3 | Status: SHIPPED | OUTPATIENT
Start: 2024-08-27 | End: 2024-10-04

## 2024-08-27 ASSESSMENT — PAIN SCALES - GENERAL: PAINLEVEL: WORST PAIN (10)

## 2024-08-27 NOTE — Clinical Note
"8/27/2024       RE: Colletta Dwen Sorrell  [address] [facility] [address]  [place] [address]     Dear Colleague,    Thank you for referring your patient, Colletta Dwen Sorrell, to the [facility] [place] at [facility]. Please see a copy of my visit note below.    ID: Colletta Sorell is a 64-year-old woman who I follow for multiple sclerosis.  She returns for annual follow-up.    HPI: Colletta feels she has been stable from an MS perspective.  She is still walking some with her walker and says her walking is unchanged versus this time 1 year ago.  Motor function in the arms is fine, and she has no visual cognitive or bulbar symptoms.  She has bladder urgency that is unchanged.  She rates fatigue as one of her worst symptoms.  She is on ocrelizumab, every 11 months schedule but her most recent infusion on 8/5/2024 was done more like 13 months after her previous 1.  She says an insurance change was to blame for that.  She did get a bit of an infusion reaction with the most recent infusion but was able to complete it.  She gets cervical injections for pain in her neck, and says that will be coming up soon.    She brings a list of many questions as usual.  She is applying to some program that told her they need my email address so that I could confirm her diagnosis, and they do not except such documentation by fax or mail.  She asked about \"parasites in the brain\" as a cause of MS, which she says she heard 2 different doctors proclaim.  I told her there is no evidence of that, other than the clear link between Elena-Barr virus infection and MS, which we discussed.    Past Medical History:   Diagnosis Date    Cubital tunnel syndrome     Meralgia paraesthetica     MS (multiple sclerosis) (H)     Multinodular thyroid     Neuropathy         Current Outpatient Medications:     Alpha-Lipoic Acid 300 MG CAPS, TAKE ONE CAPSULE BY MOUTH EVERY DAY, " Disp: 30 capsule, Rfl: 11    amantadine (SYMMETREL) 100 MG capsule, Take 1 capsule (100 mg) by mouth 2 times daily, Disp: 60 capsule, Rfl: 11    apixaban ANTICOAGULANT (ELIQUIS) 5 MG tablet, Take 1 tablet (5 mg) by mouth 2 times daily, Disp: 180 tablet, Rfl: 3    Ascorbic Acid (VITAMIN C PO), Take 1.5 teaspoonful by mouth daily , Disp: , Rfl:     B Complex Vitamins (VITAMIN B COMPLEX PO), Take 1 tablet by mouth as needed , Disp: , Rfl:     benzonatate (TESSALON) 100 MG capsule, Take 1-2 capsules by mouth up to 3 times daily as needed for cough., Disp: 30 capsule, Rfl: 0    CALCIUM-VITAMIN D PO, Take 1 tablet by mouth daily Vit d 7,0000 unit, Disp: , Rfl:     cholecalciferol (VITAMIN D3) 5000 units (125 mcg) capsule, TAKE ONE CAPSULE BY MOUTH EVERY DAY, Disp: 30 capsule, Rfl: 11    COMPRESSION STOCKINGS, Please measure and distribute 2 pair of 20mmHg - 30mmHg THIGH  high open or closed toe compression stockings with extra refills as indicated or what insurance will allow ., Disp: 2 each, Rfl: 3    Cyanocobalamin (VITAMIN B 12 PO), Take 2,500 mcg by mouth daily, Disp: , Rfl:     Green Tea, Radha sinensis, (GREEN TEA PO), Liquid form, Disp: , Rfl:     HEMP OIL OR EXTRACT OR OTHER CBD CANNABINOID, NOT MEDICAL CANNABIS,, , Disp: , Rfl:     Incontinence Supply Disposable (DEPEND FIT-FLEX-WOMEN-M) MISC, USE 1-2 PER DAY, Disp: 36 each, Rfl: 3    Lactobacillus (ACIDOPHILUS) CAPS, TAKE ONE CAPSULE BY MOUTH ONCE DAILY, Disp: 30 capsule, Rfl: 10    MAGNESIUM OXIDE PO, Take 500 mg by mouth daily, Disp: , Rfl:     melatonin 3 MG tablet, Take 3 mg by mouth nightly as needed, Disp: , Rfl:     methylPREDNISolone (MEDROL DOSEPAK) 4 MG tablet therapy pack, Follow package instructions, Disp: 21 tablet, Rfl: 0    Multiple Vitamins-Minerals (MULTIVITAMIN ADULT) TABS, Take 1 tablet by mouth daily, Disp: 30 tablet, Rfl: 11    Naltrexone POWD, Take 4.5 mg by mouth At Bedtime, Disp: 1 Bottle, Rfl: 3    Ocrelizumab (OCREVUS IV), , Disp: ,  Rfl:     Omega-3 Fatty Acids (FISH OIL) 1200 MG capsule, Take 1,200 mg by mouth daily, Disp: , Rfl:     oxyBUTYnin ER (DITROPAN XL) 10 MG 24 hr tablet, Take 2 tablets (20 mg) by mouth daily., Disp: 180 tablet, Rfl: 3    Probiotic Product (ACIDOPHILUS PROBIOTIC BLEND) CAPS, TAKE ONE CAPSULE BY MOUTH EVERY DAY, Disp: 30 capsule, Rfl: 11    TURMERIC PO, , Disp: , Rfl:     Vitamin A 02664 units CAPS, , Disp: , Rfl:     Exam: She is alert and oriented.  Affect is bright and language functions are normal.  Cranial nerves are unremarkable.  Tone is normal in the arms, spastic at the left ankle.  Strength in the arms is normal except for finger abduction weakness on the left 4/5.  Hip flexion is 3/5 bilaterally.  Ankle dorsiflexion is normal on the right, less than antigravity on the left.  Finger tapping is mildly slow on the left, toe tapping basically impossible.  Finger-nose-finger is normal.  Light touch is intact in all 4 limbs.  Reflexes are normal and symmetric at the elbows, asymmetric at the knees brisker on the left.  I did not have her attempt to walk today.    Impression: Primary progressive MS, stable on ocrelizumab.  I spent 42 minutes on her care on the date of service including chart review and face-to-face time.  I answered her many questions to the best of my ability, and we discussed the different phenotypes of MS at her request.    The longitudinal plan of care for the diagnosis(es)/condition(s) as documented were addressed during this visit. Due to the added complexity in care, I will continue to support Colletta in the subsequent management and with ongoing continuity of care.    Plan: Next Ocrevus infusion in July 2025.  Follow-up in 1 year.    This note was completed in part using voice-recognition software, and some typographic errors may be present as a result.       Again, thank you for allowing me to participate in the care of your patient.      Sincerely,    Barrera Shannon MD

## 2024-08-27 NOTE — NURSING NOTE
Chief Complaint   Patient presents with    MS    RECHECK     Annual follow up      Vitals were taken and medications were reconciled.   Celestine Valencia, EMT  9:40 AM

## 2024-08-28 NOTE — PROGRESS NOTES
"ID: Colletta Sorell is a 64-year-old woman who I follow for multiple sclerosis.  She returns for annual follow-up.    HPI: Colletta feels she has been stable from an MS perspective.  She is still walking some with her walker and says her walking is unchanged versus this time 1 year ago.  Motor function in the arms is fine, and she has no visual cognitive or bulbar symptoms.  She has bladder urgency that is unchanged.  She rates fatigue as one of her worst symptoms.  She is on ocrelizumab, every 11 months schedule but her most recent infusion on 8/5/2024 was done more like 13 months after her previous 1.  She says an insurance change was to blame for that.  She did get a bit of an infusion reaction with the most recent infusion but was able to complete it.  She gets cervical injections for pain in her neck, and says that will be coming up soon.    She brings a list of many questions as usual.  She is applying to some program that told her they need my email address so that I could confirm her diagnosis, and they do not except such documentation by fax or mail.  She asked about \"parasites in the brain\" as a cause of MS, which she says she heard 2 different doctors proclaim.  I told her there is no evidence of that, other than the clear link between Elena-Barr virus infection and MS, which we discussed.    Past Medical History:   Diagnosis Date    Cubital tunnel syndrome     Meralgia paraesthetica     MS (multiple sclerosis) (H)     Multinodular thyroid     Neuropathy         Current Outpatient Medications:     Alpha-Lipoic Acid 300 MG CAPS, TAKE ONE CAPSULE BY MOUTH EVERY DAY, Disp: 30 capsule, Rfl: 11    amantadine (SYMMETREL) 100 MG capsule, Take 1 capsule (100 mg) by mouth 2 times daily, Disp: 60 capsule, Rfl: 11    apixaban ANTICOAGULANT (ELIQUIS) 5 MG tablet, Take 1 tablet (5 mg) by mouth 2 times daily, Disp: 180 tablet, Rfl: 3    Ascorbic Acid (VITAMIN C PO), Take 1.5 teaspoonful by mouth daily , Disp: , Rfl: "     B Complex Vitamins (VITAMIN B COMPLEX PO), Take 1 tablet by mouth as needed , Disp: , Rfl:     benzonatate (TESSALON) 100 MG capsule, Take 1-2 capsules by mouth up to 3 times daily as needed for cough., Disp: 30 capsule, Rfl: 0    CALCIUM-VITAMIN D PO, Take 1 tablet by mouth daily Vit d 7,0000 unit, Disp: , Rfl:     cholecalciferol (VITAMIN D3) 5000 units (125 mcg) capsule, TAKE ONE CAPSULE BY MOUTH EVERY DAY, Disp: 30 capsule, Rfl: 11    COMPRESSION STOCKINGS, Please measure and distribute 2 pair of 20mmHg - 30mmHg THIGH  high open or closed toe compression stockings with extra refills as indicated or what insurance will allow ., Disp: 2 each, Rfl: 3    Cyanocobalamin (VITAMIN B 12 PO), Take 2,500 mcg by mouth daily, Disp: , Rfl:     Green Tea, Radha sinensis, (GREEN TEA PO), Liquid form, Disp: , Rfl:     HEMP OIL OR EXTRACT OR OTHER CBD CANNABINOID, NOT MEDICAL CANNABIS,, , Disp: , Rfl:     Incontinence Supply Disposable (DEPEND FIT-FLEX-WOMEN-M) MISC, USE 1-2 PER DAY, Disp: 36 each, Rfl: 3    Lactobacillus (ACIDOPHILUS) CAPS, TAKE ONE CAPSULE BY MOUTH ONCE DAILY, Disp: 30 capsule, Rfl: 10    MAGNESIUM OXIDE PO, Take 500 mg by mouth daily, Disp: , Rfl:     melatonin 3 MG tablet, Take 3 mg by mouth nightly as needed, Disp: , Rfl:     methylPREDNISolone (MEDROL DOSEPAK) 4 MG tablet therapy pack, Follow package instructions, Disp: 21 tablet, Rfl: 0    Multiple Vitamins-Minerals (MULTIVITAMIN ADULT) TABS, Take 1 tablet by mouth daily, Disp: 30 tablet, Rfl: 11    Naltrexone POWD, Take 4.5 mg by mouth At Bedtime, Disp: 1 Bottle, Rfl: 3    Ocrelizumab (OCREVUS IV), , Disp: , Rfl:     Omega-3 Fatty Acids (FISH OIL) 1200 MG capsule, Take 1,200 mg by mouth daily, Disp: , Rfl:     oxyBUTYnin ER (DITROPAN XL) 10 MG 24 hr tablet, Take 2 tablets (20 mg) by mouth daily., Disp: 180 tablet, Rfl: 3    Probiotic Product (ACIDOPHILUS PROBIOTIC BLEND) CAPS, TAKE ONE CAPSULE BY MOUTH EVERY DAY, Disp: 30 capsule, Rfl: 11     TURMERIC PO, , Disp: , Rfl:     Vitamin A 47914 units CAPS, , Disp: , Rfl:     Exam: She is alert and oriented.  Affect is bright and language functions are normal.  Cranial nerves are unremarkable.  Tone is normal in the arms, spastic at the left ankle.  Strength in the arms is normal except for finger abduction weakness on the left 4/5.  Hip flexion is 3/5 bilaterally.  Ankle dorsiflexion is normal on the right, less than antigravity on the left.  Finger tapping is mildly slow on the left, toe tapping basically impossible.  Finger-nose-finger is normal.  Light touch is intact in all 4 limbs.  Reflexes are normal and symmetric at the elbows, asymmetric at the knees brisker on the left.  I did not have her attempt to walk today.    Impression: Primary progressive MS, stable on ocrelizumab.  I spent 42 minutes on her care on the date of service including chart review and face-to-face time.  I answered her many questions to the best of my ability, and we discussed the different phenotypes of MS at her request.    The longitudinal plan of care for the diagnosis(es)/condition(s) as documented were addressed during this visit. Due to the added complexity in care, I will continue to support Colletta in the subsequent management and with ongoing continuity of care.    Plan: Next Ocrevus infusion in July 2025.  Follow-up in 1 year.    This note was completed in part using voice-recognition software, and some typographic errors may be present as a result.

## 2024-10-15 ENCOUNTER — TELEPHONE (OUTPATIENT)
Dept: VASCULAR SURGERY | Facility: CLINIC | Age: 64
End: 2024-10-15
Payer: COMMERCIAL

## 2024-10-15 DIAGNOSIS — I82.401 ACUTE DEEP VEIN THROMBOSIS (DVT) OF RIGHT LOWER EXTREMITY, UNSPECIFIED VEIN (H): Primary | ICD-10-CM

## 2024-10-15 DIAGNOSIS — M79.89 LEFT LEG SWELLING: ICD-10-CM

## 2024-10-15 DIAGNOSIS — I82.409 DVT (DEEP VENOUS THROMBOSIS) (H): ICD-10-CM

## 2024-10-15 DIAGNOSIS — I87.1 MAY-THURNER SYNDROME: ICD-10-CM

## 2024-10-15 DIAGNOSIS — I83.893 SYMPTOMATIC VARICOSE VEINS OF BOTH LOWER EXTREMITIES: ICD-10-CM

## 2024-10-15 DIAGNOSIS — I87.1 ILIAC VEIN STENOSIS, LEFT: ICD-10-CM

## 2024-10-15 NOTE — TELEPHONE ENCOUNTER
Called pt to fup on her left leg    She states that the swelling has significantly come down.     Informed her that Dr. Rivera would also like for her to have a CT scan prior to hear appt on Thurs 10/24/     She's states that she has to reserve the metro mobility and will see what she can do.

## 2024-10-16 NOTE — PROGRESS NOTES
Called pt to fup on her left leg swelling.     She states that the swelling has gone down significantly.     She understands that she is coming next week for an ultrasound and appointment with Dr. Rivera.         Jazmyn VANCE RN, BSN  Interventional Radiology/Vascular  Nurse Coordinator  Phone: [phone], option 2

## 2024-10-25 NOTE — TELEPHONE ENCOUNTER
Pt just rescheduled her imaging for the appointment that was canceled yesterday and wants to schedule her VASC for the same day if possible. Okay to leave a .

## 2024-10-26 ENCOUNTER — HEALTH MAINTENANCE LETTER (OUTPATIENT)
Age: 64
End: 2024-10-26

## 2024-10-28 ENCOUNTER — TELEPHONE (OUTPATIENT)
Dept: VASCULAR SURGERY | Facility: CLINIC | Age: 64
End: 2024-10-28
Payer: COMMERCIAL

## 2024-10-28 NOTE — TELEPHONE ENCOUNTER
Called pt to fup on her appts with Dr. Rivera.     Informed her that Dr. Rivera would like to have her get a CT scan also. I informed her that we could not schedule both the CT and US on the same day.     In order to minimize travel time we will plan to reschedule for all her appts to be on the same day    She agrees to plan.     *called pt back and informed her that  we have pt scheduled for 11/21, CT, US and then a fup with Dr. Rivera on the same day,  and that we will also mail out a letter as well.     She verbalized understanding.         Jazmyn VANCE RN, BSN  Interventional Radiology/Vascular  Nurse Coordinator  Phone: [phone], option 2

## 2024-10-28 NOTE — TELEPHONE ENCOUNTER
Return call to pt    Provided her the appt details and information regarding follow up with Dr. Rivera for may thurner.     Informed her that this is scheduled for 11/21. All times and appt details provided to her.     We will also send a letter with information to her as well.     She verbalized understanding         Jazmyn VANCE RN, BSN  Interventional Radiology/Vascular  Nurse Coordinator  Phone: [phone], option 2

## 2024-10-28 NOTE — TELEPHONE ENCOUNTER
The pt is scheduled per your request.  Letter will be sent out on 10/30/24   Rajinder Castellanos on 10/28/2024 at 11:07 AM

## 2024-11-21 ENCOUNTER — ANCILLARY PROCEDURE (OUTPATIENT)
Dept: ULTRASOUND IMAGING | Facility: CLINIC | Age: 64
End: 2024-11-21
Attending: STUDENT IN AN ORGANIZED HEALTH CARE EDUCATION/TRAINING PROGRAM
Payer: COMMERCIAL

## 2024-11-21 ENCOUNTER — ANCILLARY PROCEDURE (OUTPATIENT)
Dept: CT IMAGING | Facility: CLINIC | Age: 64
End: 2024-11-21
Attending: STUDENT IN AN ORGANIZED HEALTH CARE EDUCATION/TRAINING PROGRAM
Payer: COMMERCIAL

## 2024-11-21 ENCOUNTER — OFFICE VISIT (OUTPATIENT)
Dept: VASCULAR SURGERY | Facility: CLINIC | Age: 64
End: 2024-11-21
Payer: COMMERCIAL

## 2024-11-21 VITALS — WEIGHT: 148 LBS | OXYGEN SATURATION: 100 % | HEART RATE: 66 BPM | BODY MASS INDEX: 26.22 KG/M2

## 2024-11-21 DIAGNOSIS — M79.89 LEFT LEG SWELLING: ICD-10-CM

## 2024-11-21 DIAGNOSIS — I87.1 MAY-THURNER SYNDROME: ICD-10-CM

## 2024-11-21 DIAGNOSIS — I87.1 ILIAC VEIN STENOSIS, LEFT: Primary | ICD-10-CM

## 2024-11-21 DIAGNOSIS — I82.890 THROMBOSIS OF OVARIAN VEIN: ICD-10-CM

## 2024-11-21 DIAGNOSIS — I83.893 SYMPTOMATIC VARICOSE VEINS OF BOTH LOWER EXTREMITIES: ICD-10-CM

## 2024-11-21 DIAGNOSIS — I82.409 DVT (DEEP VENOUS THROMBOSIS) (H): ICD-10-CM

## 2024-11-21 DIAGNOSIS — I82.4Z2 LOWER LEG DVT (DEEP VENOUS THROMBOEMBOLISM), ACUTE, LEFT (H): Primary | ICD-10-CM

## 2024-11-21 DIAGNOSIS — I87.1 ILIAC VEIN STENOSIS, LEFT: ICD-10-CM

## 2024-11-21 DIAGNOSIS — I82.3 RENAL VEIN THROMBOSIS (H): ICD-10-CM

## 2024-11-21 LAB — RADIOLOGIST FLAGS: ABNORMAL

## 2024-11-21 PROCEDURE — 93970 EXTREMITY STUDY: CPT | Performed by: RADIOLOGY

## 2024-11-21 PROCEDURE — 74174 CTA ABD&PLVS W/CONTRAST: CPT | Performed by: RADIOLOGY

## 2024-11-21 RX ORDER — APIXABAN 5 MG (74)
KIT ORAL
Qty: 74 EACH | Refills: 0 | Status: SHIPPED | OUTPATIENT
Start: 2024-11-21 | End: 2024-12-21

## 2024-11-21 RX ORDER — IOPAMIDOL 755 MG/ML
90 INJECTION, SOLUTION INTRAVASCULAR ONCE
Status: COMPLETED | OUTPATIENT
Start: 2024-11-21 | End: 2024-11-21

## 2024-11-21 RX ADMIN — IOPAMIDOL 90 ML: 755 INJECTION, SOLUTION INTRAVASCULAR at 10:00

## 2024-11-21 ASSESSMENT — PAIN SCALES - GENERAL: PAINLEVEL_OUTOF10: NO PAIN (0)

## 2024-11-21 NOTE — DISCHARGE INSTRUCTIONS

## 2024-11-21 NOTE — PROGRESS NOTES
INTERVENTIONAL RADIOLOGY follow up    Name: Colletta Sorrell  Age: 64 year old   Referring Physician: Dr. Rivera   REASON FOR follow up: left lower extremity post thrombotic syndrome     HPI:   Alicia Starkey is a 63 yo with a history of MS diagnosedin . About 9 months ago was working with a physical therapist related to her MS and was found to have lower extremity swelling, much worse on the left and was found to have nonocclusive thrombus in the left leg. She then started anticoagulation which she took for about 2 months but then she stopped as the swelling had gone down and didn't realize that she had refills for the prescription. She has not worn compression lately as her symptoms have improved. Her symptoms have greatly improved including swelling and pain in the left leg that is worse throughout the day and improves with elevation. Activity level is low due to functional limitations related to MS. Today her left shoulder is bothering her the most and she states her legs are not bothering her at all. Her left leg is still heavier than the right however her MS is worse on the left than the right as well.    She denies any abdominal pain or hematuria.      PAST MEDICAL HISTORY:   Past Medical History:   Diagnosis Date    Cubital tunnel syndrome     Meralgia paraesthetica     MS (multiple sclerosis) (H)     Multinodular thyroid     Neuropathy        PAST SURGICAL HISTORY:   Past Surgical History:   Procedure Laterality Date    INJECT EPIDURAL CERVICAL Left 2024    Procedure: C7-T1 interlaminar epidural steroid injection;  Surgeon: Dorothy Frank MD;  Location: [facility] OR       FAMILY HISTORY:   Family History   Problem Relation Age of Onset    Multiple Sclerosis Father        SOCIAL HISTORY:   Social History     Tobacco Use    Smoking status: Former     Current packs/day: 0.00     Types: Cigarettes     Quit date: 2017     Years since quittin.8    Smokeless tobacco: Never   Substance Use Topics     Alcohol use: Yes     Alcohol/week: 1.0 standard drink of alcohol     Types: 1 Glasses of wine per week     Comment: EVERY 2 WEEKS       PROBLEM LIST:   Patient Active Problem List    Diagnosis Date Noted    Iliac vein stenosis, left 05/30/2024     Priority: Medium    Lymphedema of both lower extremities 05/30/2024     Priority: Medium    Cervical radicular pain 02/02/2024     Priority: Medium    Cubital tunnel syndrome, left 09/18/2018     Priority: Medium    Multiple sclerosis (H) 07/11/2017     Priority: Medium    Ankle swelling, left 06/19/2017     Priority: Medium    Fatigue 09/25/2014     Priority: Medium    Multinodular thyroid 09/25/2014     Priority: Medium    Meralgia paresthetica 05/28/2013     Priority: Medium    Neuropathy 01/07/2013     Priority: Medium    Anxiety 01/06/2011     Priority: Medium    Cigarette smoker 12/09/2010     Priority: Medium       MEDICATIONS:   Prescription Medications as of 11/21/2024         Rx Number Disp Refills Start End Last Dispensed Date Next Fill Date Owning Pharmacy    Alpha-Lipoic Acid 300 MG CAPS  30 capsule 11 4/7/2021 --   [facility] [place] [address] [address]     Sig: TAKE ONE CAPSULE BY MOUTH EVERY DAY    Class: E-Prescribe    amantadine (SYMMETREL) 100 MG capsule  60 capsule 11 8/29/2023 --   [id number] - [place], [place] - [address] AT [facility] [address]    Sig: Take 1 capsule (100 mg) by mouth 2 times daily    Class: E-Prescribe    Route: Oral    apixaban ANTICOAGULANT (ELIQUIS) 5 MG tablet  180 tablet 3 8/15/2024 --   [facility] - [place], [place] - [facility] [address]    Sig: Take 1 tablet (5 mg) by mouth 2 times daily    Class: E-Prescribe    Route: Oral    Ascorbic Acid (VITAMIN C PO)  -- -- 6/19/2018 --       Sig: Take 1.5 teaspoonful by mouth daily     Class: Historical    Route: Oral    B Complex Vitamins (VITAMIN B COMPLEX PO)  -- --  --       Sig: Take 1 tablet by mouth  as needed     Class: Historical    Route: Oral    benzonatate (TESSALON) 100 MG capsule  30 capsule 0 5/31/2022 --   [id number] - [place], [place] - [address] AT [facility] [address]    Sig: Take 1-2 capsules by mouth up to 3 times daily as needed for cough.    Class: E-Prescribe    CALCIUM-VITAMIN D PO  -- -- 6/19/2018 --       Sig: Take 1 tablet by mouth daily Vit d 7,0000 unit    Class: Historical    Route: Oral    cholecalciferol (VITAMIN D3) 5000 units (125 mcg) capsule  30 capsule 11 3/16/2020 --   [facility] [place] [address] [address]    Sig: TAKE ONE CAPSULE BY MOUTH EVERY DAY    Class: E-Prescribe    COMPRESSION STOCKINGS  2 each 3 7/11/2024 --       Sig: Please measure and distribute 2 pair of 20mmHg - 30mmHg THIGH  high open or closed toe compression stockings with extra refills as indicated or what insurance will allow .    Class: Local Print    Cyanocobalamin (VITAMIN B 12 PO)  -- -- 6/19/2018 --       Sig: Take 2,500 mcg by mouth daily    Class: Historical    Route: Oral    Green Tea, Radha sinensis, (GREEN TEA PO)  -- --  --   [facility] [place] [address] [address]    Sig: Liquid form    Class: Historical    Route: Oral    HEMP OIL OR EXTRACT OR OTHER CBD CANNABINOID, NOT MEDICAL CANNABIS,  -- --  --   [facility] [place] [address] [address]    Class: Historical    Incontinence Supply Disposable (DEPEND FIT-FLEX-WOMEN-M) MISC  36 each 3 11/13/2023 --   [facility] [place] [address] [address]    Sig: USE 1-2 PER DAY    Class: E-Prescribe    Lactobacillus (ACIDOPHILUS) CAPS  30 capsule 10 4/21/2021 --   [facility] [place] [address] [address]    Sig: TAKE ONE CAPSULE BY MOUTH ONCE DAILY    Class: E-Prescribe    MAGNESIUM OXIDE PO  -- -- 6/19/2018 --       Sig: Take 500 mg by mouth daily    Class: Historical    Route:  Oral    melatonin 3 MG tablet  -- -- 7/29/2014 --       Sig: Take 3 mg by mouth nightly as needed    Class: Historical    Route: Oral    methylPREDNISolone (MEDROL DOSEPAK) 4 MG tablet therapy pack  21 tablet 0 6/28/2024 --   [id number] - [place], [place] - [address] AT [facility] [address]    Sig: Follow package instructions    Class: E-Prescribe    Multiple Vitamins-Minerals (MULTIVITAMIN ADULT) TABS  30 tablet 11 9/25/2018 --   [facility] [address] [address]    Sig: Take 1 tablet by mouth daily    Class: E-Prescribe    Route: Oral    Naltrexone POWD  1 Bottle 3 5/31/2019 --   [facility] - [place], [place] - [address]    Sig: Take 4.5 mg by mouth At Bedtime    Class: E-Prescribe    Route: Oral    Ocrelizumab (OCREVUS IV)  -- --  --       Class: Historical    Route: Intravenous    Omega-3 Fatty Acids (FISH OIL) 1200 MG capsule  -- -- 6/19/2018 --       Sig: Take 1,200 mg by mouth daily    Class: Historical    Route: Oral    oxyBUTYnin ER (DITROPAN XL) 10 MG 24 hr tablet  180 tablet 3 10/4/2024 --   [facility] [address] [address]    Sig: Take 2 tablets (20 mg) by mouth daily.    Class: E-Prescribe    Route: Oral    Probiotic Product (ACIDOPHILUS PROBIOTIC BLEND) CAPS  30 capsule 11 3/16/2020 --   [facility] [address] [address]    Sig: TAKE ONE CAPSULE BY MOUTH EVERY DAY    Class: E-Prescribe    TURMERIC PO  -- --  --   [facility] [place] [address] [address]    Class: Historical    Route: Oral    Vitamin A 86114 units CAPS  -- --  --   [facility] [address] [address]    Class: Historical    Route: Oral          Clinic-Administered Medications as of 11/21/2024         Dose Frequency Start End    CT Sodium Chloride (Completed) 100 mL ONCE 11/21/2024 11/21/2024    Admin Instructions:  "This entry is for use by Radiology to intermittently used as a flush in patients receiving a CT scan.    Route: Intravenous    iopamidol (ISOVUE-370) solution 90 mL (Completed) 90 mL ONCE 11/21/2024 11/21/2024    Route: Intravenous            ALLERGIES:   Compazine [prochlorperazine]    ROS:  Negative unless otherwise stated in HPI.      Physical Examination:   VITALS:   Pulse 66   Wt 67.1 kg (148 lb)   SpO2 100%   BMI 26.22 kg/m      General: alert and oriented x 3  HEENT: normocephalic and atraumatic  Neck: supple  CV: no cyanosis  Respiratory: non labored breathing  Extremities: no significant swelling measurements symmetric 13.75  inches at the mid calc mid thigh measurements symmetric at 20 inches.     Labs:    BMP RESULTS:  Lab Results   Component Value Date     06/27/2024     08/14/2019    POTASSIUM 4.0 06/27/2024    POTASSIUM 3.9 08/14/2019    CHLORIDE 103 06/27/2024    CHLORIDE 110 (H) 08/14/2019    CO2 26 06/27/2024    CO2 28 08/14/2019    ANIONGAP 10 06/27/2024    ANIONGAP 5 08/14/2019    GLC 85 06/27/2024    GLC 86 08/14/2019    BUN 9.8 06/27/2024    BUN 6 (L) 08/14/2019    CR 0.66 06/27/2024    CR 0.70 08/14/2019    GFRESTIMATED >90 06/27/2024    GFRESTIMATED >90 08/14/2019    GFRESTBLACK >90 08/14/2019    SHRUTHI 9.5 06/27/2024    SHRUTHI 9.0 08/14/2019        CBC RESULTS:  Lab Results   Component Value Date    WBC 4.9 06/27/2024    WBC 3.2 (L) 08/14/2019    RBC 4.78 06/27/2024    RBC 4.59 08/14/2019    HGB 13.2 06/27/2024    HGB 12.9 08/14/2019    HCT 40.3 06/27/2024    HCT 40.1 08/14/2019    MCV 84 06/27/2024    MCV 87 08/14/2019    MCH 27.6 06/27/2024    MCH 28.1 08/14/2019    MCHC 32.8 06/27/2024    MCHC 32.2 08/14/2019    RDW 15.2 (H) 06/27/2024    RDW 14.6 08/14/2019     06/27/2024     08/14/2019       INR/PTT:  No results found for: \"INR\", \"PTT\"    Diagnostic studies:   Left lower extremity ultrasound 11/21/2024 with similar post thrombotic changes in one of the duplicated " mid femoral veins.     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 There is new thrombus within enlarged left gonadal vein which was likely a significant collateral draining the LLE, this extends into the anterior left renal vein where it appears non-occlusive. There is also a posterior left renal vein coursing behind the aorta which appears patent.    Assessment     Alicia Starkey is a 65 yo with a history of MS diagnosedin 2013. About 9 months ago was working with a physical therapist related to her MS and was found to have lower extremity swelling, much worse on the left and was found to have nonocclusive thrombus in the left leg. She took her anticoagulation until her first bottle ran out but she did not realize she had refills and only took them for two months. Her symptoms greatly improved after the first bottle of blood thinners. She also stopped compression after her symptoms improved. She was also found to have thrombus in the left ovarian and renal vein which likely developed after she stopped her blood thinners. She has no symptoms related to this thrombus.  Based on her symptoms stenting is not indicated at this time despite may thurner anatomy. She needs to resume anticoagulation per hematology and will likely need long term anticoagulation.     Moya score: 5    Plan   - Resume anticoagulation (discussed with MADELIN Gutierrez from Hematology about gonadal vein/ anterior left renal vein thrombus), loading dose followed by maintenance as per hematology  - Follow up in 3 months after she sees hematology with updated CTV and LLE DVT ultrasound  - Low threshold for left CIV/ EIV stenting if her left leg becomes symptomatic in the future, though she will likely need to be on anticoagulation long term considering her limited mobility/ left renal vein thrombus, main indication for intervention will be symptoms    CC  Patient Care  Team:  [facility] as PCP - General  Denisse Mata MD as MD (Neurology)  Barrera Shannon MD as MD (Neurology)  Debbie Starkey APRN CNP as Assigned PCP  Jaimee Scott PA-C as Assigned Cancer Care Provider  Barrera Shannon MD as Assigned Neuroscience Provider  ELÍAS RIVERA    ----- Service Performed and Documented by Resident or Fellow ------      There are no diagnoses linked to this encounter.     Review of external notes as documented above   Independent interpretation of a test performed by another physician/other qualified health care professional (not separately reported) - CTV 11/21/2024 and us left lower extremity veins 11/21/2024    Franky Oscar MD    I, Elías Rivera, was present with the resident/ fellow during the history and exam. I discussed the case with the fellow and agree with the findings as documented in the assessment and plan.

## 2024-11-21 NOTE — NURSING NOTE
Chief Complaint   Patient presents with    Follow Up     Return pt here for a followup       Vitals were taken, medications reconciled.     Wilfrid Bearden, Clinic Assistant    11:07 AM

## 2024-11-21 NOTE — LETTER
11/21/2024       RE: Colletta Dwen Sorrell  [address] [facility] [address]     Dear Colleague,    Thank you for referring your patient, Colletta Dwen Sorrell, to the [facility] [place] at [facility]. Please see a copy of my visit note below.        INTERVENTIONAL RADIOLOGY follow up    Name: Colletta Sorrell  Age: 64 year old   Referring Physician: Dr. Rivera   REASON FOR follow up: left lower extremity post thrombotic syndrome     HPI:   Alicia Starkey is a 65 yo with a history of MS diagnosedin 2013. About 9 months ago was working with a physical therapist related to her MS and was found to have lower extremity swelling, much worse on the left and was found to have nonocclusive thrombus in the left leg. She then started anticoagulation which she took for about 2 months but then she stopped as the swelling had gone down and didn't realize that she had refills for the prescription. She has not worn compression lately as her symptoms have improved. Her symptoms have greatly improved including swelling and pain in the left leg that is worse throughout the day and improves with elevation. Activity level is low due to functional limitations related to MS. Today her left shoulder is bothering her the most and she states her legs are not bothering her at all. Her left leg is still heavier than the right however her MS is worse on the left than the right as well.    She denies any abdominal pain or hematuria.      PAST MEDICAL HISTORY:   Past Medical History:   Diagnosis Date     Cubital tunnel syndrome      Meralgia paraesthetica      MS (multiple sclerosis) (H)      Multinodular thyroid      Neuropathy        PAST SURGICAL HISTORY:   Past Surgical History:   Procedure Laterality Date     INJECT EPIDURAL CERVICAL Left 2/26/2024    Procedure: C7-T1 interlaminar epidural steroid injection;  Surgeon: Dorothy Frank MD;  Location: [facility] OR        FAMILY HISTORY:   Family History   Problem Relation Age of Onset     Multiple Sclerosis Father        SOCIAL HISTORY:   Social History     Tobacco Use     Smoking status: Former     Current packs/day: 0.00     Types: Cigarettes     Quit date: 2017     Years since quittin.8     Smokeless tobacco: Never   Substance Use Topics     Alcohol use: Yes     Alcohol/week: 1.0 standard drink of alcohol     Types: 1 Glasses of wine per week     Comment: EVERY 2 WEEKS       PROBLEM LIST:   Patient Active Problem List    Diagnosis Date Noted     Iliac vein stenosis, left 2024     Priority: Medium     Lymphedema of both lower extremities 2024     Priority: Medium     Cervical radicular pain 2024     Priority: Medium     Cubital tunnel syndrome, left 2018     Priority: Medium     Multiple sclerosis (H) 2017     Priority: Medium     Ankle swelling, left 2017     Priority: Medium     Fatigue 2014     Priority: Medium     Multinodular thyroid 2014     Priority: Medium     Meralgia paresthetica 2013     Priority: Medium     Neuropathy 2013     Priority: Medium     Anxiety 2011     Priority: Medium     Cigarette smoker 2010     Priority: Medium       MEDICATIONS:   Prescription Medications as of 2024         Rx Number Disp Refills Start End Last Dispensed Date Next Fill Date Owning Pharmacy    Alpha-Lipoic Acid 300 MG CAPS  30 capsule 11 2021 --   [facility] - [place], [place] - [address]    Sig: TAKE ONE CAPSULE BY MOUTH EVERY DAY    Class: E-Prescribe    amantadine (SYMMETREL) 100 MG capsule  60 capsule 11 2023 --   [id number] - [place], [place] [address] AT [facility] [address]     Sig: Take 1 capsule (100 mg) by mouth 2 times daily    Class: E-Prescribe    Route: Oral    apixaban ANTICOAGULANT (ELIQUIS) 5 MG tablet  180 tablet 3 8/15/2024 --   [facility] -  [place] [address]    Sig: Take 1 tablet (5 mg) by mouth 2 times daily    Class: E-Prescribe    Route: Oral    Ascorbic Acid (VITAMIN C PO)  -- -- 6/19/2018 --       Sig: Take 1.5 teaspoonful by mouth daily     Class: Historical    Route: Oral    B Complex Vitamins (VITAMIN B COMPLEX PO)  -- --  --       Sig: Take 1 tablet by mouth as needed     Class: Historical    Route: Oral    benzonatate (TESSALON) 100 MG capsule  30 capsule 0 5/31/2022 --   [id number] - [place], [place] - [address] AT [facility] [address]    Sig: Take 1-2 capsules by mouth up to 3 times daily as needed for cough.    Class: E-Prescribe    CALCIUM-VITAMIN D PO  -- -- 6/19/2018 --       Sig: Take 1 tablet by mouth daily Vit d 7,0000 unit    Class: Historical    Route: Oral    cholecalciferol (VITAMIN D3) 5000 units (125 mcg) capsule  30 capsule 11 3/16/2020 --   [facility] - [place] [address]    Sig: TAKE ONE CAPSULE BY MOUTH EVERY DAY    Class: E-Prescribe    COMPRESSION STOCKINGS  2 each 3 7/11/2024 --       Sig: Please measure and distribute 2 pair of 20mmHg - 30mmHg THIGH  high open or closed toe compression stockings with extra refills as indicated or what insurance will allow .    Class: Local Print    Cyanocobalamin (VITAMIN B 12 PO)  -- -- 6/19/2018 --       Sig: Take 2,500 mcg by mouth daily    Class: Historical    Route: Oral    Green Tea, Radha sinensis, (GREEN TEA PO)  -- --  --   [facility] - [place] [address]    Sig: Liquid form    Class: Historical    Route: Oral    HEMP OIL OR EXTRACT OR OTHER CBD CANNABINOID, NOT MEDICAL CANNABIS,  -- --  --   [facility] - [place] [address]    Class: Historical    Incontinence Supply Disposable (DEPEND FIT-FLEX-WOMEN-M) MISC  36 each 3 11/13/2023 --   [facility] - [place] [address]    Sig: USE  1-2 PER DAY    Class: E-Prescribe    Lactobacillus (ACIDOPHILUS) CAPS  30 capsule 10 4/21/2021 --   [facility] [place] [address] [address]    Sig: TAKE ONE CAPSULE BY MOUTH ONCE DAILY    Class: E-Prescribe    MAGNESIUM OXIDE PO  -- -- 6/19/2018 --       Sig: Take 500 mg by mouth daily    Class: Historical    Route: Oral    melatonin 3 MG tablet  -- -- 7/29/2014 --       Sig: Take 3 mg by mouth nightly as needed    Class: Historical    Route: Oral    methylPREDNISolone (MEDROL DOSEPAK) 4 MG tablet therapy pack  21 tablet 0 6/28/2024 --   [id number] - [place], [place] - [address] AT [facility] [address]    Sig: Follow package instructions    Class: E-Prescribe    Multiple Vitamins-Minerals (MULTIVITAMIN ADULT) TABS  30 tablet 11 9/25/2018 --   [facility] [place] [address] [address]    Sig: Take 1 tablet by mouth daily    Class: E-Prescribe    Route: Oral    Naltrexone POWD  1 Bottle 3 5/31/2019 --   [facility] [place] [place] - [address]    Sig: Take 4.5 mg by mouth At Bedtime    Class: E-Prescribe    Route: Oral    Ocrelizumab (OCREVUS IV)  -- --  --       Class: Historical    Route: Intravenous    Omega-3 Fatty Acids (FISH OIL) 1200 MG capsule  -- -- 6/19/2018 --       Sig: Take 1,200 mg by mouth daily    Class: Historical    Route: Oral    oxyBUTYnin ER (DITROPAN XL) 10 MG 24 hr tablet  180 tablet 3 10/4/2024 --   [facility] [place] [address] [address]    Sig: Take 2 tablets (20 mg) by mouth daily.    Class: E-Prescribe    Route: Oral    Probiotic Product (ACIDOPHILUS PROBIOTIC BLEND) CAPS  30 capsule 11 3/16/2020 --   [facility] [place] [address] [address]    Sig: TAKE ONE CAPSULE BY MOUTH EVERY DAY    Class: E-Prescribe    TURMERIC PO  -- --  --   [facility] [place] [address] [address]    Class: Historical     Route: Oral    Vitamin A 62901 units CAPS  -- --  --   [facility] - [place], [place] - [address]    Class: Historical    Route: Oral          Clinic-Administered Medications as of 11/21/2024         Dose Frequency Start End    CT Sodium Chloride (Completed) 100 mL ONCE 11/21/2024 11/21/2024    Admin Instructions: This entry is for use by Radiology to intermittently used as a flush in patients receiving a CT scan.    Route: Intravenous    iopamidol (ISOVUE-370) solution 90 mL (Completed) 90 mL ONCE 11/21/2024 11/21/2024    Route: Intravenous            ALLERGIES:   Compazine [prochlorperazine]    ROS:  Negative unless otherwise stated in HPI.      Physical Examination:   VITALS:   Pulse 66   Wt 67.1 kg (148 lb)   SpO2 100%   BMI 26.22 kg/m      General: alert and oriented x 3  HEENT: normocephalic and atraumatic  Neck: supple  CV: no cyanosis  Respiratory: non labored breathing  Extremities: no significant swelling measurements symmetric 13.75  inches at the mid calc mid thigh measurements symmetric at 20 inches.     Labs:    BMP RESULTS:  Lab Results   Component Value Date     06/27/2024     08/14/2019    POTASSIUM 4.0 06/27/2024    POTASSIUM 3.9 08/14/2019    CHLORIDE 103 06/27/2024    CHLORIDE 110 (H) 08/14/2019    CO2 26 06/27/2024    CO2 28 08/14/2019    ANIONGAP 10 06/27/2024    ANIONGAP 5 08/14/2019    GLC 85 06/27/2024    GLC 86 08/14/2019    BUN 9.8 06/27/2024    BUN 6 (L) 08/14/2019    CR 0.66 06/27/2024    CR 0.70 08/14/2019    GFRESTIMATED >90 06/27/2024    GFRESTIMATED >90 08/14/2019    GFRESTBLACK >90 08/14/2019    SHRUTHI 9.5 06/27/2024    SHRUTHI 9.0 08/14/2019        CBC RESULTS:  Lab Results   Component Value Date    WBC 4.9 06/27/2024    WBC 3.2 (L) 08/14/2019    RBC 4.78 06/27/2024    RBC 4.59 08/14/2019    HGB 13.2 06/27/2024    HGB 12.9 08/14/2019    HCT 40.3 06/27/2024    HCT 40.1 08/14/2019    MCV 84 06/27/2024    MCV 87 08/14/2019    MCH 27.6 06/27/2024  "   [facility] 28.1 08/14/2019    [facility] 32.8 06/27/2024    [facility] 32.2 08/14/2019    RDW 15.2 (H) 06/27/2024    RDW 14.6 08/14/2019     06/27/2024     08/14/2019       INR/PTT:  No results found for: \"INR\", \"PTT\"    Diagnostic studies:   Left lower extremity ultrasound 11/21/2024 with similar post thrombotic changes in one of the duplicated mid femoral veins.     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 There is new thrombus within enlarged left gonadal vein which was likely a significant collateral draining the LLE, this extends into the anterior left renal vein where it appears non-occlusive. There is also a posterior left renal vein coursing behind the aorta which appears patent.    Assessment     Alicia Starkey is a 65 yo with a history of MS diagnosedin 2013. About 9 months ago was working with a physical therapist related to her MS and was found to have lower extremity swelling, much worse on the left and was found to have nonocclusive thrombus in the left leg. She took her anticoagulation until her first bottle ran out but she did not realize she had refills and only took them for two months. Her symptoms greatly improved after the first bottle of blood thinners. She also stopped compression after her symptoms improved. She was also found to have thrombus in the left ovarian and renal vein which likely developed after she stopped her blood thinners. She has no symptoms related to this thrombus.  Based on her symptoms stenting is not indicated at this time despite may thurner anatomy. She needs to resume anticoagulation per hematology and will likely need long term anticoagulation.     Moya score: 5    Plan   - Resume anticoagulation (discussed with MADELIN Gutierrez from Hematology about gonadal vein/ anterior left renal vein thrombus), loading dose followed by maintenance as per hematology  - Follow up in 3 months after she sees " hematology with updated CTV and LLE DVT ultrasound  - Low threshold for left CIV/ EIV stenting if her left leg becomes symptomatic in the future, though she will likely need to be on anticoagulation long term considering her limited mobility/ left renal vein thrombus, main indication for intervention will be symptoms    CC  Patient Care Team:  [facility] as PCP - Denisse Kelsey MD as MD (Neurology)  Barrera Shannon MD as MD (Neurology)  Debbie Starkey APRN CNP as Assigned PCP  Jaimee Scott PA-C as Assigned Cancer Care Provider  Barrera Shannon MD as Assigned Neuroscience Provider  ELÍAS RIVERA    ----- Service Performed and Documented by Resident or Fellow ------      There are no diagnoses linked to this encounter.     Review of external notes as documented above   Independent interpretation of a test performed by another physician/other qualified health care professional (not separately reported) - CTV 11/21/2024 and us left lower extremity veins 11/21/2024    Franky Oscar MD    I, Elías Rivera, was present with the resident/ fellow during the history and exam. I discussed the case with the fellow and agree with the findings as documented in the assessment and plan.             Again, thank you for allowing me to participate in the care of your patient.      Sincerely,    Elías Rivera MD

## 2024-11-26 ENCOUNTER — TELEPHONE (OUTPATIENT)
Dept: FAMILY MEDICINE | Facility: CLINIC | Age: 64
End: 2024-11-26
Payer: COMMERCIAL

## 2024-11-26 ENCOUNTER — DOCUMENTATION ONLY (OUTPATIENT)
Dept: ANTICOAGULATION | Facility: CLINIC | Age: 64
End: 2024-11-26
Payer: COMMERCIAL

## 2024-11-26 NOTE — TELEPHONE ENCOUNTER
Order/Referral Request    Who is requesting: pt    Orders being requested: laser surgery - for disc in back     Reason service is needed/diagnosis: back pain     When are orders needed by: asap    Has this been discussed with Provider: Yes    Does patient have a preference on a Group/Provider/Facility? Where the providers thinks she should go. Looking for advice.     Does patient have an appointment scheduled?: No    Where to send orders:  and Place orders within Epic    Could we [facility] this [facility] or would you prefer to receive a phone call?:   Patient would prefer a phone call   Okay to leave a detailed message?: Yes at Home number on file [phone] (home)     Patient would like to discuss this with care team. Offered to make an appt to discuss this with provider but patient declined. Patient has not seen provider since may 2024.       Mami HAWTHORNE,    [facility]

## 2024-11-26 NOTE — PROGRESS NOTES
Anticoagulant Therapeutic Duplication    Duplicate orders identified: same medication but different dose, form, frequency or route    Duplicate orders appropriate-has starter pack order and ongoing therapy order; there is also an old apixaban maintenance therapy Rx from 8/15/24-- this order has been discontinued.    Active anticoagulant: apixaban (Eliquis)    Plan made per [facility] anticoagulation protocol.    Kyleigh Mckee RN  11/26/2024

## 2024-12-02 NOTE — TELEPHONE ENCOUNTER
Called patient and relayed provider message below, patient verbalized understanding. Agrees with plan - states she has mobility issues and would prefer video appt. Appt made, patient aware of appt time. No further questions or concerns.      Mami Rutledge RN, BSN  [facility]

## 2024-12-02 NOTE — TELEPHONE ENCOUNTER
I am an internal medicine physician  I cannot order any surgeries  I cannot recommend any surgeries  I cannot recommend any procedures  Ordering any procedure or recommending any procedures he is beyond the remit of my practice and I can be liable for this  She has to consult a specialist who deals with back problems  I am happy to help her with referrals if she wants me to do that but certainly I cannot order any procedures for her or recommend any procedures as to do that I should know what are the risks and benefits of each procedure and how the procedure is performed which I have no clue but again if she wants any referrals to the specialist I can certainly help to arrange that for her and she can make an appointment with me to discuss about getting these referrals as I have seen her only once and that to it was in May and it was more than 6 months ago  Please call patient and explain the above

## 2024-12-11 ENCOUNTER — VIRTUAL VISIT (OUTPATIENT)
Dept: FAMILY MEDICINE | Facility: CLINIC | Age: 64
End: 2024-12-11
Payer: COMMERCIAL

## 2024-12-11 ENCOUNTER — MYC MEDICAL ADVICE (OUTPATIENT)
Dept: FAMILY MEDICINE | Facility: CLINIC | Age: 64
End: 2024-12-11

## 2024-12-11 DIAGNOSIS — M54.12 CERVICAL RADICULOPATHY: ICD-10-CM

## 2024-12-11 DIAGNOSIS — I82.4Z2 LOWER LEG DVT (DEEP VENOUS THROMBOEMBOLISM), ACUTE, LEFT (H): Primary | ICD-10-CM

## 2024-12-11 DIAGNOSIS — G35 MULTIPLE SCLEROSIS (H): ICD-10-CM

## 2024-12-11 DIAGNOSIS — M48.02 CERVICAL STENOSIS OF SPINAL CANAL: ICD-10-CM

## 2024-12-11 DIAGNOSIS — I87.1 MAY-THURNER SYNDROME: ICD-10-CM

## 2024-12-11 PROCEDURE — 99214 OFFICE O/P EST MOD 30 MIN: CPT | Mod: 95 | Performed by: INTERNAL MEDICINE

## 2024-12-11 PROCEDURE — G2211 COMPLEX E/M VISIT ADD ON: HCPCS | Mod: 95 | Performed by: INTERNAL MEDICINE

## 2024-12-11 NOTE — PROGRESS NOTES
"  If patient has telephone visit, have they been educated on video visit as preferred visit method and offered to change to video visit? NOT APPLICABLE        Instructions Relayed to Patient by Virtual Roomer:     Patient is active on [employer]:   Relayed following to patient: \"It looks like you are active on [employer], are you able to join the visit this way? If not, do you need us to send you a link now or would you like your provider to send a link via text or email when they are ready to initiate the visit?\"      Patient Confirmed they will join visit via: [employer]  Reminded patient to ensure they were logged on to virtual visit by arrival time listed.   Asked if patient has flexibility to initiate visit sooner than arrival time: patient is unable to initiate visit earlier than arrival time     If pediatric virtual visit, ensured pediatric patient along with parent/guardian will be present for video visit.     Patient offered the website [email or web address] and/or phone number to [employer] Help line: [phone]      Colletta is a 64 year old who is being evaluated via a billable video visit.    How would you like to obtain your AVS? [employer]  If the video visit is dropped, the invitation should be resent by: Text to cell phone: [phone]  Will anyone else be joining your video visit? No      Assessment & Plan     Lower leg DVT (deep venous thromboembolism), acute, left (H)  She remains on chronic anticoagulation  I understand she has been anticoagulated for life   hematology did not do any thrombophilia workup as they thought it would not change the management  She did have some post thrombotic symptoms in the form of leg swelling and she was supposed to be on compression stockings      May-Thurner syndrome  She has chronic left iliac vein stenosis  She does have the anatomy of May Thurner syndrome She follows up with vascular clinic and they are having a   Low threshold for left CIV/ EIV stenting " "    Multiple sclerosis (H)  On Ocrevus infusions and follows up with neurologist    Cervical radiculopathy  She has chronic pain in her neck  MRIs were done in the past  She had 3 MRIs till now  They show extensive cervical radiculopathy with foraminal stenosis and disc bulge at C5-C6  She is now having weakness in the left upper extremity  She is unable to grasp firmly with the left hand  She also has some tingling and numbness in the left upper extremity  All the symptoms are concerning for worsening cervical radiculopathy  She tried  epidural injections 3 times already and I had also some physical therapy in the past  I think it is time to consider more definitive approach like surgery  She is interested in micro discectomy versus laser surgery  Consult placed for spine  - Spine  Referral; Future    Cervical stenosis of spinal canal  As above she failed conservative options in the form of steroid injections and physical therapy  She wants to explore more definitive approach as her symptoms are getting very bad and interfering with her quality of life  - Spine  Referral; Future    Also briefly discussed about mammogram and colonoscopy as she is due for these but she tells me that she wants to get them done once her insurance issues are resolved as she is having some issues with her insurance currently      BMI  Estimated body mass index is 26.22 kg/m  as calculated from the following:    Height as of 8/27/24: 1.6 m (5' 3\").    Weight as of 11/21/24: 67.1 kg (148 lb).             Subjective Colletta is a 64 year old, presenting for the following health issues:  No chief complaint on file.        12/11/2024     8:10 AM   Additional Questions   Roomed by Bettye   Accompanied by self     Pain    History of Present Illness       Reason for visit:  Back pain        Pain History:  When did you first notice your pain? ongoing   Have you seen this provider for your pain in the past? No   Where in your " body do your have pain? Bulging disk  Are you seeing anyone else for your pain? Yes - within [place]   What makes your pain better? none  What makes your pain worse? Sitting lying down, bending  How has pain affected your ability to work? Not currently working - unrelated to pain  Who lives in your household? 1 daughter                 No data to display                     No data to display                Chronic Pain - Initial Assessment:    How would you describe your pain? 10/10   Have you had any recent changes to the severity or character of your pain? Pain always present  Is there an underlying cause that has been identified? none  Has your ability to work or do daily activities changed recently because of your pain? N/A  Which of these pain treatments have you tried? Other: none  Previous medication treatments:  Patient states is not taking any medications to relieve pain              How many servings of fruits and vegetables do you eat daily?  0-1  On average, how many sweetened beverages do you drink each day (Examples: soda, juice, sweet tea, etc.  Do NOT count diet or artificially sweetened beverages)?   0  How many days per week do you exercise enough to make your heart beat faster? 3 or less  How many minutes a day do you exercise enough to make your heart beat faster? 9 or less  How many days per week do you miss taking your medication? 0        Review of Systems  Constitutional, HEENT, cardiovascular, pulmonary, gi and gu systems are negative, except as otherwise noted.      Objective           Vitals:  No vitals were obtained today due to virtual visit.    Physical Exam   GENERAL: alert and no distress  EYES: Eyes grossly normal to inspection.  No discharge or erythema, or obvious scleral/conjunctival abnormalities.  RESP: No audible wheeze, cough, or visible cyanosis.    SKIN: Visible skin clear. No significant rash, abnormal pigmentation or lesions.  NEURO: Cranial nerves grossly intact.   Mentation and speech appropriate for age.  PSYCH: Appropriate affect, tone, and pace of words          Video-Visit Details    Type of service:  Video Visit   Originating Location (pt. Location): Home    Distant Location (provider location):  Off-site  Platform used for Video Visit: Jim  Signed Electronically by: Lionel Owen MD

## 2024-12-12 ENCOUNTER — PATIENT OUTREACH (OUTPATIENT)
Dept: CARE COORDINATION | Facility: CLINIC | Age: 64
End: 2024-12-12
Payer: COMMERCIAL

## 2024-12-21 ENCOUNTER — OFFICE VISIT (OUTPATIENT)
Dept: URGENT CARE | Facility: URGENT CARE | Age: 64
End: 2024-12-21
Payer: COMMERCIAL

## 2024-12-21 VITALS
WEIGHT: 146.4 LBS | TEMPERATURE: 97.9 F | OXYGEN SATURATION: 99 % | DIASTOLIC BLOOD PRESSURE: 74 MMHG | HEART RATE: 71 BPM | SYSTOLIC BLOOD PRESSURE: 120 MMHG | BODY MASS INDEX: 25.93 KG/M2 | RESPIRATION RATE: 16 BRPM

## 2024-12-21 DIAGNOSIS — K13.0 LIP ABSCESS: Primary | ICD-10-CM

## 2024-12-21 DIAGNOSIS — Z86.718 PERSONAL HISTORY OF DVT (DEEP VEIN THROMBOSIS): ICD-10-CM

## 2024-12-21 DIAGNOSIS — Z79.01 ANTICOAGULATED: ICD-10-CM

## 2024-12-21 PROCEDURE — 99214 OFFICE O/P EST MOD 30 MIN: CPT | Performed by: PHYSICIAN ASSISTANT

## 2024-12-21 NOTE — PROGRESS NOTES
Chief Complaint   Patient presents with    Mouth/Lip Problem     Lip swelling possible cyst              ASSESSMENT:    ICD-10-CM    1. Lip abscess  K13.0 amoxicillin-clavulanate (AUGMENTIN) 875-125 MG tablet      2. Personal history of DVT (deep vein thrombosis)  Z86.718       3. Anticoagulated  Z79.01           PLAN: Lip abscess.  Recommend ER as we have no advanced imaging to better determine if it is indeed abscess.  Also consider spider bite.  She declines ER at this time but she would like to try oral antibiotic first.  She feels it has become smaller with salt that she put on it.  Augmentin.  Warm moist compresses.  Salt water gargles.  If it becomes larger, more painful, she develops fever or chills go to the ER immediately over the weekend.  .  Follow-up with primary care provider next week.  Advised about symptoms which might herald more serious problems.        Sarah Epstein PA-C         SUBJECTIVE:  64 year old female presents with right upper lip swelling and tenderness that she woke up with 2 days ago.  No fever or chills.  Only hurts over the lip.  No mouth pain or tooth pain.  No posterior throat pain.  Tried salt on it which seemed to help the swelling go down.  Feels like there is some sort of abscess.  No difficulty breathing.  No new medications.  No new topicals.  No new foods.  On Eliquis for DVT               Allergies   Allergen Reactions    Compazine [Prochlorperazine] Swelling     Swelling of tongue about 25 years ago       Past Medical History:   Diagnosis Date    Cubital tunnel syndrome     Meralgia paraesthetica     MS (multiple sclerosis) (H)     Multinodular thyroid     Neuropathy        Current Outpatient Medications   Medication Sig Dispense Refill    Alpha-Lipoic Acid 300 MG CAPS TAKE ONE CAPSULE BY MOUTH EVERY DAY 30 capsule 11    apixaban ANTICOAGULANT (ELIQUIS ANTICOAGULANT) 5 MG tablet Take 1 tablet (5 mg) by mouth 2 times daily. After starter pack runs out 180 tablet 3     Apixaban Starter Pack (ELIQUIS DVT/PE STARTER PACK) 5 MG TBPK Take 10 mg by mouth 2 times daily for 7 days, THEN 5 mg 2 times daily for 23 days. Then start taking Eliquis 5mg twice daily after that indefinitely. 74 each 0    Ascorbic Acid (VITAMIN C PO) Take 1.5 teaspoonful by mouth daily       B Complex Vitamins (VITAMIN B COMPLEX PO) Take 1 tablet by mouth as needed       CALCIUM-VITAMIN D PO Take 1 tablet by mouth daily Vit d 7,0000 unit      cholecalciferol (VITAMIN D3) 5000 units (125 mcg) capsule TAKE ONE CAPSULE BY MOUTH EVERY DAY 30 capsule 11    COMPRESSION STOCKINGS Please measure and distribute 2 pair of 20mmHg - 30mmHg THIGH  high open or closed toe compression stockings with extra refills as indicated or what insurance will allow . 2 each 3    Cyanocobalamin (VITAMIN B 12 PO) Take 2,500 mcg by mouth daily      Green Tea, Radha sinensis, (GREEN TEA PO) Liquid form      Lactobacillus (ACIDOPHILUS) CAPS TAKE ONE CAPSULE BY MOUTH ONCE DAILY 30 capsule 10    MAGNESIUM OXIDE PO Take 500 mg by mouth daily      melatonin 3 MG tablet Take 3 mg by mouth nightly as needed.      methylPREDNISolone (MEDROL DOSEPAK) 4 MG tablet therapy pack Follow package instructions 21 tablet 0    Multiple Vitamins-Minerals (MULTIVITAMIN ADULT) TABS Take 1 tablet by mouth daily 30 tablet 11    Omega-3 Fatty Acids (FISH OIL) 1200 MG capsule Take 1,200 mg by mouth daily      oxyBUTYnin ER (DITROPAN XL) 10 MG 24 hr tablet Take 2 tablets (20 mg) by mouth daily. 180 tablet 3    amantadine (SYMMETREL) 100 MG capsule Take 1 capsule (100 mg) by mouth 2 times daily (Patient not taking: Reported on 12/11/2024) 60 capsule 11    HEMP OIL OR EXTRACT OR OTHER CBD CANNABINOID, NOT MEDICAL CANNABIS,  (Patient not taking: Reported on 11/21/2024)      Incontinence Supply Disposable (DEPEND FIT-FLEX-WOMEN-M) MISC USE 1-2 PER DAY (Patient not taking: Reported on 12/21/2024) 36 each 3    Naltrexone POWD Take 4.5 mg by mouth At Bedtime (Patient not  taking: Reported on 2024) 1 Bottle 3    Ocrelizumab (OCREVUS IV)  (Patient not taking: Reported on 2024)      Probiotic Product (ACIDOPHILUS PROBIOTIC BLEND) CAPS TAKE ONE CAPSULE BY MOUTH EVERY DAY (Patient not taking: Reported on 2024) 30 capsule 11    TURMERIC PO  (Patient not taking: Reported on 2024)      Vitamin A 86888 units CAPS  (Patient not taking: Reported on 2024)       No current facility-administered medications for this visit.       Social History     Tobacco Use    Smoking status: Former     Current packs/day: 0.00     Types: Cigarettes     Quit date: 2017     Years since quittin.9    Smokeless tobacco: Never   Substance Use Topics    Alcohol use: Yes     Alcohol/week: 1.0 standard drink of alcohol     Types: 1 Glasses of wine per week     Comment: EVERY 2 WEEKS    Drug use: No       ROS:  General: negative for fever  SKIN: + as above  ENT: as above    Physcial Exam:  /74   Pulse 71   Temp 97.9  F (36.6  C) (Oral)   Resp 16   Wt 66.4 kg (146 lb 6.4 oz)   SpO2 99%   BMI 25.93 kg/m      GENERAL: alert, no acute distress  EYES: conjunctival clear  RESP: Regular breathing rate  NEURO: awake .  SKIN: Skin of face is without rash.  Posterior pharynx is without erythema or swelling.  Tongue no swelling.  Right lower lateral lip is with inner palpable visual red lump, large pea to dime size.  Very firm to palpation.  Does feel like there could be abscess.  No palpable fluctuance.  Sarah Epstein PA-C

## 2025-01-09 NOTE — TELEPHONE ENCOUNTER
Patient Quality Outreach    Patient is due for the following:   Colon Cancer Screening  Breast Cancer Screening - Mammogram  Cervical Cancer Screening - PAP Needed  Physical Preventive Adult Physical      Topic Date Due    COVID-19 Vaccine (1) Never done    Pneumococcal Vaccine (1 of 2 - PCV) Never done    Zoster (Shingles) Vaccine (1 of 2) Never done    Flu Vaccine (1) 09/01/2024       Action(s) Taken:   Schedule a Adult Preventative    Type of outreach:    Sent [employer] message.    Questions for provider review:    None           Kirsten Montiel

## 2025-02-13 ENCOUNTER — TELEPHONE (OUTPATIENT)
Dept: NEUROSURGERY | Facility: CLINIC | Age: 65
End: 2025-02-13
Payer: COMMERCIAL

## 2025-02-13 NOTE — TELEPHONE ENCOUNTER
Has forms asking for proof of necessity of MRI and then will help with payment    Offered to have Karen's office visit note from 1/18/24 scanned to pt's email.   [email or web address]     Routed to [employer] for processing   Pt hopes for it by end of day

## 2025-02-13 NOTE — TELEPHONE ENCOUNTER
[facility] Call Center    Phone Message    May a detailed message be left on voicemail: yes     Reason for Call: Other: Patient is need a note for financial assistance on why the radiology test is needed. She is needing this today. And is requesting it be sent through [employer].      Action Taken: Message routed to:  Other: [facility]    Travel Screening: Not Applicable

## 2025-02-18 NOTE — PROGRESS NOTES
[facility]  [address] [place] [address] [address], [place] [address]  Main: [phone], Fax: [phone]      Video Virtual Visit Note:    Patient: Colletta Dwen Sorrell  MRN: [id number]  : 1960  FIOR: 2025      Due to the ongoing COVID-19 outbreak, this visit was conducted by video, with the patient's approval.      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 She was evaluated by IR however intervention was not pursued as it was unlikely to provide symptomatic benefit. She was recommended to stay on Eliquis at 5mg twice daily.    She saw IR in follow up in 2024. 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 Left lower extremity showed similar post thrombotic changes of one of the duplicated mid femoral veins. At that visit, she noted that she had discontinued her Eliquis prescription as symptoms improved. She was restarted on anticoagulation with loading dose followed by Eliquis 5mg twice daily.    No inheritable thrombophilia evaluation was ordered as it was not likely to . APS antibodies were ordered but never completed by patient.     Interim History:  Today, Colletta notes that she is doing quite well. She remains on Eliquis 5mg twice daily and is tolerating it. She denies any bruising or bleeding concerns. She is taking it twice a day consistently with very infrequent missed doses. She notes cost has been reasonable to her. She notes that she is not having any left leg pain or swelling. She has not required use of compression  tarik recently. She notes that she is between insurance plans right now and expects to have approval in the next 6-8 weeks. She notes that after her insurance is sorted, she will proceed with CTV and LLE US as planned and see Dr. Miguel salvador. She also notes that she has had ongoing back pain refractory to spinal epidural injections and she is considering possible non invasive intervention. She also would like to resume physical therapy and massage therapy once her insurance is active again.     ROS:  Denies any bleeding complications. Specifically, no frequent epistaxis. No issues with oral mucosal bleeding. Denies any hematuria or blood in stools. Denies any shortness of breath. No chest pain. No cough. No fever. No leg pain or swelling.       Medications:   Current Outpatient Medications   Medication Sig Dispense Refill    Alpha-Lipoic Acid 300 MG CAPS TAKE ONE CAPSULE BY MOUTH EVERY DAY 30 capsule 11    amantadine (SYMMETREL) 100 MG capsule Take 1 capsule (100 mg) by mouth 2 times daily (Patient not taking: Reported on 12/11/2024) 60 capsule 11    amoxicillin-clavulanate (AUGMENTIN) 875-125 MG tablet Take 1 tablet by mouth 2 times daily. 20 tablet 0    apixaban ANTICOAGULANT (ELIQUIS ANTICOAGULANT) 5 MG tablet Take 1 tablet (5 mg) by mouth 2 times daily. After starter pack runs out 180 tablet 3    Ascorbic Acid (VITAMIN C PO) Take 1.5 teaspoonful by mouth daily       B Complex Vitamins (VITAMIN B COMPLEX PO) Take 1 tablet by mouth as needed       CALCIUM-VITAMIN D PO Take 1 tablet by mouth daily Vit d 7,0000 unit      cholecalciferol (VITAMIN D3) 5000 units (125 mcg) capsule TAKE ONE CAPSULE BY MOUTH EVERY DAY 30 capsule 11    COMPRESSION STOCKINGS Please measure and distribute 2 pair of 20mmHg - 30mmHg THIGH  high open or closed toe compression stockings with extra refills as indicated or what insurance will allow . 2 each 3    Cyanocobalamin (VITAMIN B 12 PO) Take 2,500 mcg by mouth daily      Green Tea,  Radha sinensis, (GREEN TEA PO) Liquid form      HEMP OIL OR EXTRACT OR OTHER CBD CANNABINOID, NOT MEDICAL CANNABIS,  (Patient not taking: Reported on 2024)      Incontinence Supply Disposable (DEPEND FIT-FLEX-WOMEN-M) MISC USE 1-2 PER DAY (Patient not taking: Reported on 2024) 36 each 3    Lactobacillus (ACIDOPHILUS) CAPS TAKE ONE CAPSULE BY MOUTH ONCE DAILY 30 capsule 10    MAGNESIUM OXIDE PO Take 500 mg by mouth daily      melatonin 3 MG tablet Take 3 mg by mouth nightly as needed.      methylPREDNISolone (MEDROL DOSEPAK) 4 MG tablet therapy pack Follow package instructions 21 tablet 0    Multiple Vitamins-Minerals (MULTIVITAMIN ADULT) TABS Take 1 tablet by mouth daily 30 tablet 11    Naltrexone POWD Take 4.5 mg by mouth At Bedtime (Patient not taking: Reported on 2024) 1 Bottle 3    Ocrelizumab (OCREVUS IV)  (Patient not taking: Reported on 2024)      Omega-3 Fatty Acids (FISH OIL) 1200 MG capsule Take 1,200 mg by mouth daily      oxyBUTYnin ER (DITROPAN XL) 10 MG 24 hr tablet Take 2 tablets (20 mg) by mouth daily. 180 tablet 3    Probiotic Product (ACIDOPHILUS PROBIOTIC BLEND) CAPS TAKE ONE CAPSULE BY MOUTH EVERY DAY (Patient not taking: Reported on 2024) 30 capsule 11    TURMERIC PO  (Patient not taking: Reported on 2024)      Vitamin A 67870 units CAPS  (Patient not taking: Reported on 2024)          Allergies:      Allergies   Allergen Reactions    Compazine [Prochlorperazine] Swelling     Swelling of tongue about 25 years ago       PMH:   Past Medical History:   Diagnosis Date    Cubital tunnel syndrome     Meralgia paraesthetica     MS (multiple sclerosis) (H)     Multinodular thyroid     Neuropathy        Social History:   Social History     Tobacco Use    Smoking status: Former     Current packs/day: 0.00     Types: Cigarettes     Quit date: 2017     Years since quittin.1    Smokeless tobacco: Never   Substance Use Topics    Alcohol use: Yes      Alcohol/week: 1.0 standard drink of alcohol     Types: 1 Glasses of wine per week     Comment: EVERY 2 WEEKS    Drug use: No       Family History:  No family history of bleeding or clotting disorders     Objective:  Visual Examination via Video:  Pleasant in no acute distress.  Normal work of breathing   A+O x 3    Labs:  CBC RESULTS:   Recent Labs   Lab Test 06/27/24  1745   WBC 4.9   RBC 4.78   HGB 13.2   HCT 40.3   MCV 84   MCH 27.6   MCHC 32.8   RDW 15.2*        Last Comprehensive Metabolic Panel:  Sodium   Date Value Ref Range Status   06/27/2024 139 135 - 145 mmol/L Final   08/14/2019 143 133 - 144 mmol/L Final     Potassium   Date Value Ref Range Status   06/27/2024 4.0 3.4 - 5.3 mmol/L Final   08/14/2019 3.9 3.4 - 5.3 mmol/L Final     Chloride   Date Value Ref Range Status   06/27/2024 103 98 - 107 mmol/L Final   08/14/2019 110 (H) 94 - 109 mmol/L Final     Carbon Dioxide   Date Value Ref Range Status   08/14/2019 28 20 - 32 mmol/L Final     Carbon Dioxide (CO2)   Date Value Ref Range Status   06/27/2024 26 22 - 29 mmol/L Final     Anion Gap   Date Value Ref Range Status   06/27/2024 10 7 - 15 mmol/L Final   08/14/2019 5 3 - 14 mmol/L Final     Glucose   Date Value Ref Range Status   06/27/2024 85 70 - 99 mg/dL Final   08/14/2019 86 70 - 99 mg/dL Final     Comment:     Non Fasting     Urea Nitrogen   Date Value Ref Range Status   06/27/2024 9.8 8.0 - 23.0 mg/dL Final   08/14/2019 6 (L) 7 - 30 mg/dL Final     Creatinine   Date Value Ref Range Status   06/27/2024 0.66 0.51 - 0.95 mg/dL Final   08/14/2019 0.70 0.52 - 1.04 mg/dL Final     GFR Estimate   Date Value Ref Range Status   06/27/2024 >90 >60 mL/min/1.73m2 Final     Comment:     eGFR calculated using 2021 CKD-EPI equation.   08/14/2019 >90 >60 mL/min/[1.73_m2] Final     Comment:     Non  GFR Calc  Starting 12/18/2018, serum creatinine based estimated GFR (eGFR) will be   calculated using the Chronic Kidney Disease Epidemiology  Collaboration   (CKD-EPI) equation.       Calcium   Date Value Ref Range Status   06/27/2024 9.5 8.8 - 10.2 mg/dL Final   08/14/2019 9.0 8.5 - 10.1 mg/dL Final     Liver Function Studies -   Recent Labs   Lab Test 06/27/24  1745   PROTTOTAL 7.6   ALBUMIN 4.6   BILITOTAL 0.4   ALKPHOS 74   AST 27   ALT 22         Imaging:  No results found for this or any previous visit (from the past 744 hours).     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 Intervention was considered however not felt to provide her significant symptomatic benefit thus was deferred. After interruption of anticoagulation and notable presence of new gonadal and renal vein thrombosis, she was restarted on Eliquis with reloading. She notes symptoms are improved. She is consistently taking her medication. She denies any leg pain or edema. She is due for repeat imaging but is waiting for insurance change to occur.     Plan:  Continue Eliquis 5mg twice daily. Recommend lifelong anticoagulation as long as bleeding risk remains low.   Compression for post thrombotic syndrome symptoms as needed.    Repeat CT venogram and LLE US prior to appointment with Dr. Rivera when insurance is active again.   Consider left iliac vein stenting if symptoms progress.       Annual visit in 1 year, sooner if any concerns.     Video-Visit Details:  Type of service:  Video Visit  Video Start Time:  1000  Video End Time (time video stopped): 1015  Originating Location (pt. Location): Home  Distant Location (provider location):  [facility]   Mode of Communication:  Video Conference via Michael Lobo, MPH, PA-C  [facility]    20 minutes spent by me on the date of the encounter doing chart review, review of outside records, review of test results, interpretation of tests, patient visit, and documentation The longitudinal plan of care for the diagnosis(es)/condition(s) as documented were addressed during this visit. Due to the added complexity in care, I will continue to support Colletta in the subsequent management and with ongoing continuity of care.

## 2025-02-19 ENCOUNTER — VIRTUAL VISIT (OUTPATIENT)
Dept: HEMATOLOGY | Facility: CLINIC | Age: 65
End: 2025-02-19
Attending: PHYSICIAN ASSISTANT
Payer: COMMERCIAL

## 2025-02-19 VITALS — BODY MASS INDEX: 26.57 KG/M2 | WEIGHT: 150 LBS

## 2025-02-19 DIAGNOSIS — I82.3 RENAL VEIN THROMBOSIS (H): ICD-10-CM

## 2025-02-19 DIAGNOSIS — I82.4Z2 LOWER LEG DVT (DEEP VENOUS THROMBOEMBOLISM), ACUTE, LEFT (H): ICD-10-CM

## 2025-02-19 DIAGNOSIS — I82.522 CHRONIC DEEP VEIN THROMBOSIS (DVT) OF LEFT ILIAC VEIN (H): Primary | ICD-10-CM

## 2025-02-19 DIAGNOSIS — Z71.89 ENCOUNTER FOR ANTICOAGULATION DISCUSSION AND COUNSELING: ICD-10-CM

## 2025-02-19 DIAGNOSIS — I82.890 THROMBOSIS OF OVARIAN VEIN: ICD-10-CM

## 2025-02-19 DIAGNOSIS — I82.3: ICD-10-CM

## 2025-02-19 DIAGNOSIS — I87.1 ILIAC VEIN STENOSIS, LEFT: ICD-10-CM

## 2025-02-19 NOTE — Clinical Note
Hi Sandeep, Colletta is between insurance. She is still on Eliquis. Could someone from your team reach out in 1-2 months to see if insurance is active and help arrange follow up CTV and LLE US. She is currently asymptomatic and doing well so feel that this can be delayed until insurance sorter. Thanks much.  ROX MCINTOSH PA-C on 2/19/2025 at 10:23 AM

## 2025-02-19 NOTE — PATIENT INSTRUCTIONS
[facility]  [facility]  [facility], [address], [place] [address]  Main: [phone], Fax: [phone]    Colletta,   It was a pleasure seeing you today.  Thank you for allowing us to be involved in your care.  Please let us know if there is anything else we can do for you, so that we can be sure you are leaving completely satisfied with your care experience. Below is the plan that we discussed.     Continue Eliquis 5mg twice daily. Try not to miss doses.   Once insurance sorted, please schedule an appointment with Dr. Rivera with imaging prior.   If you do proceed with spinal procedure, recommend you hold Eliquis for 72 hours prior to procedure and resume 24 hours later.       We would like a provider on our team to see you at least annually for optimal care and to allow us to continue to prescribe for you.        Return to clinic in  in one year.    If you have questions or concerns, please don't hesitate to send a eyeQ Message for non urgent matters or contact my nurse clinician, Celeste. Her number is [phone]. If they are unavailable and you have immediate concerns, please call [phone] and ask for a nurse.     Jaimee Lobo, MPH, PA-C  [facility]      Our  is ABBE Coto. Her number is [phone].

## 2025-02-19 NOTE — PROGRESS NOTES
Patient was contacted to complete the pre-visit call prior to their telephone visit with the provider.     Allergies and medications were reviewed.     I thanked them for their time to cover this information.     Charleen Prado MA

## 2025-03-23 ENCOUNTER — HEALTH MAINTENANCE LETTER (OUTPATIENT)
Age: 65
End: 2025-03-23

## 2025-04-01 ENCOUNTER — TELEPHONE (OUTPATIENT)
Dept: RADIOLOGY | Facility: CLINIC | Age: 65
End: 2025-04-01
Payer: COMMERCIAL

## 2025-04-01 DIAGNOSIS — M79.89 LEG SWELLING: ICD-10-CM

## 2025-04-01 DIAGNOSIS — I82.409 DVT (DEEP VENOUS THROMBOSIS) (H): ICD-10-CM

## 2025-04-01 DIAGNOSIS — I87.1 ILIAC VEIN STENOSIS, LEFT: Primary | ICD-10-CM

## 2025-04-01 DIAGNOSIS — I87.1 MAY-THURNER SYNDROME: ICD-10-CM

## 2025-04-01 NOTE — TELEPHONE ENCOUNTER
Pt due for follow up with Dr. Rivera. Imaging orders placed. I called to inform pt, but no answer. I left a VM stating that our scheduling team will be contacting her to set up US/CT and a clinic visit with Dr. Rivera. Call back number left for questions.     Gretchen Delgadillo RN  Nurse Care Coordinator-Interventional Radiology  Dr. Grey Barrios  [phone]  [email or web address]

## 2025-04-01 NOTE — TELEPHONE ENCOUNTER
"Pt called me back. She states that she still doesn't have her insurance \"straightened out\". Per her chart, she has [employer] effective 1/2025. She states that she is applying for additional insurance for the cost not covered by this insurance. We talked about the follow up that is needed. I offered her the [place] financial office number, which she states she already has. She did agree to get the imaging and clinic visit with Dr. Rivera scheduled and then see what her insurance will cover. She also stated that she only has 1 month of Eliquis left. She will not be able to afford any more without assistance. I told her I would message the [facility] to see if they have any resources.   Pt states she has been feeling good. She is using a stationary pedal bike for exercise. Her legs swelling has decreased and she denies any pain.   Pt agreed with POC and I told her I would update Dr Rivera.     Gretchen Delgadillo RN  Nurse Care Coordinator-Interventional Radiology  Dr. Grey Barrios  [phone]  [email or web address]   "

## (undated) DEVICE — TRAY PAIN INJECTION 97A 640

## (undated) DEVICE — LINEN TOWEL PACK X5 5464

## (undated) DEVICE — SYR 07ML EPIDURAL LOSS OF RESISTANCE PULSATOR 4905

## (undated) DEVICE — DRSG PRIMAPORE 02X3" 7133

## (undated) DEVICE — PREP CHLORAPREP W/ORANGE TINT 10.5ML 260715

## (undated) DEVICE — NDL EPIDURAL TUOHY 20GA 3.5" 405028

## (undated) DEVICE — GLOVE BIOGEL PI ULTRATOUCH G SZ 7.0 42170

## (undated) DEVICE — TUBING EXTENSION SET MICROBORE 6" LL 2N1194

## (undated) RX ORDER — DIAZEPAM 5 MG
TABLET ORAL
Status: DISPENSED
Start: 2024-02-26